# Patient Record
Sex: MALE | Race: WHITE | NOT HISPANIC OR LATINO | URBAN - METROPOLITAN AREA
[De-identification: names, ages, dates, MRNs, and addresses within clinical notes are randomized per-mention and may not be internally consistent; named-entity substitution may affect disease eponyms.]

---

## 2021-12-17 ENCOUNTER — NEW PATIENT (OUTPATIENT)
Dept: URBAN - METROPOLITAN AREA CLINIC 53 | Facility: CLINIC | Age: 61
End: 2021-12-17

## 2021-12-17 DIAGNOSIS — H52.4: ICD-10-CM

## 2021-12-17 DIAGNOSIS — H25.13: ICD-10-CM

## 2021-12-17 DIAGNOSIS — Z01.00: ICD-10-CM

## 2021-12-17 PROCEDURE — 92004 COMPRE OPH EXAM NEW PT 1/>: CPT

## 2021-12-17 PROCEDURE — 92015 DETERMINE REFRACTIVE STATE: CPT

## 2021-12-17 ASSESSMENT — VISUAL ACUITY
OS_GLARE: 20/30
OS_CC: J1+
OD_SC: J3
OD_GLARE: 20/30
OD_CC: J1+
OS_SC: J2
OU_CC: J1+
OU_SC: 20/20-1
OD_PH: 20/20-1
OD_SC: 20/50-2
OS_GLARE: 20/30
OD_GLARE: 20/30
OU_SC: J2
OS_SC: 20/25

## 2021-12-17 ASSESSMENT — KERATOMETRY
OD_K2POWER_DIOPTERS: 44.50
OD_AXISANGLE_DEGREES: 169
OS_K1POWER_DIOPTERS: 43.75
OS_K2POWER_DIOPTERS: 44.00
OS_AXISANGLE_DEGREES: 44
OD_K1POWER_DIOPTERS: 43.75
OD_AXISANGLE2_DEGREES: 79
OS_AXISANGLE2_DEGREES: 134

## 2021-12-17 ASSESSMENT — TONOMETRY
OS_IOP_MMHG: 17
OD_IOP_MMHG: 18

## 2021-12-17 NOTE — PATIENT DISCUSSION
Patient given Rx for glasses. Pt came in for pump dc. Tolerated well by pt. Will continue to monitor.

## 2024-04-09 ENCOUNTER — COMPREHENSIVE EXAM (OUTPATIENT)
Dept: URBAN - METROPOLITAN AREA CLINIC 53 | Facility: CLINIC | Age: 64
End: 2024-04-09

## 2024-04-09 DIAGNOSIS — H25.13: ICD-10-CM

## 2024-04-09 DIAGNOSIS — H40.023: ICD-10-CM

## 2024-04-09 DIAGNOSIS — H52.4: ICD-10-CM

## 2024-04-09 DIAGNOSIS — Z01.00: ICD-10-CM

## 2024-04-09 PROCEDURE — 92014 COMPRE OPH EXAM EST PT 1/>: CPT

## 2024-04-09 PROCEDURE — 92015 DETERMINE REFRACTIVE STATE: CPT

## 2024-04-09 ASSESSMENT — KERATOMETRY
OS_AXISANGLE_DEGREES: 44
OS_AXISANGLE2_DEGREES: 134
OD_AXISANGLE2_DEGREES: 79
OS_K1POWER_DIOPTERS: 43.75
OD_K2POWER_DIOPTERS: 44.50
OD_AXISANGLE_DEGREES: 169
OD_K1POWER_DIOPTERS: 43.75
OS_K2POWER_DIOPTERS: 44.00

## 2024-04-09 ASSESSMENT — VISUAL ACUITY
OU_CC: J1+
OD_GLARE: 20/30
OS_GLARE: 20/25
OS_SC: 20/30
OD_SC: 20/50
OS_GLARE: 20/30
OD_GLARE: 20/25

## 2024-04-09 ASSESSMENT — TONOMETRY
OD_IOP_MMHG: 18
OS_IOP_MMHG: 18

## 2024-04-17 ENCOUNTER — DIAGNOSTICS ONLY (OUTPATIENT)
Dept: URBAN - METROPOLITAN AREA CLINIC 53 | Facility: CLINIC | Age: 64
End: 2024-04-17

## 2024-04-17 DIAGNOSIS — H40.023: ICD-10-CM

## 2024-04-17 PROCEDURE — 92083 EXTENDED VISUAL FIELD XM: CPT

## 2024-04-17 PROCEDURE — 92133 CPTRZD OPH DX IMG PST SGM ON: CPT

## 2024-04-17 ASSESSMENT — KERATOMETRY
OD_K1POWER_DIOPTERS: 43.75
OS_AXISANGLE2_DEGREES: 134
OD_K2POWER_DIOPTERS: 44.50
OD_AXISANGLE_DEGREES: 169
OS_K2POWER_DIOPTERS: 44.00
OS_AXISANGLE_DEGREES: 44
OS_K1POWER_DIOPTERS: 43.75
OD_AXISANGLE2_DEGREES: 79

## 2024-04-26 ENCOUNTER — DIAGNOSTICS ONLY (OUTPATIENT)
Dept: URBAN - METROPOLITAN AREA CLINIC 53 | Facility: CLINIC | Age: 64
End: 2024-04-26

## 2024-04-26 DIAGNOSIS — H40.023: ICD-10-CM

## 2024-04-26 PROCEDURE — 92083 EXTENDED VISUAL FIELD XM: CPT | Mod: NC

## 2024-04-26 ASSESSMENT — KERATOMETRY
OD_K2POWER_DIOPTERS: 44.50
OS_AXISANGLE2_DEGREES: 134
OS_AXISANGLE_DEGREES: 44
OD_AXISANGLE_DEGREES: 169
OS_K2POWER_DIOPTERS: 44.00
OS_K1POWER_DIOPTERS: 43.75
OD_AXISANGLE2_DEGREES: 79
OD_K1POWER_DIOPTERS: 43.75

## 2024-08-16 ENCOUNTER — TRANSFERRED RECORDS (OUTPATIENT)
Dept: HEALTH INFORMATION MANAGEMENT | Facility: CLINIC | Age: 64
End: 2024-08-16

## 2024-08-21 ENCOUNTER — TRANSFERRED RECORDS (OUTPATIENT)
Dept: HEALTH INFORMATION MANAGEMENT | Facility: CLINIC | Age: 64
End: 2024-08-21

## 2024-08-22 ENCOUNTER — MEDICAL CORRESPONDENCE (OUTPATIENT)
Dept: HEALTH INFORMATION MANAGEMENT | Facility: CLINIC | Age: 64
End: 2024-08-22

## 2024-08-22 DIAGNOSIS — R69 DIAGNOSIS UNKNOWN: Primary | ICD-10-CM

## 2024-08-27 ENCOUNTER — CARE COORDINATION (OUTPATIENT)
Dept: GASTROENTEROLOGY | Facility: CLINIC | Age: 64
End: 2024-08-27

## 2024-08-30 ENCOUNTER — TELEPHONE (OUTPATIENT)
Dept: GASTROENTEROLOGY | Facility: CLINIC | Age: 64
End: 2024-08-30
Payer: MEDICARE

## 2024-08-30 NOTE — TELEPHONE ENCOUNTER
Advanced Endoscopy     Referring provider: Manny WAED, [facility]     Referred to: [facility]     Provider Requested: na     Referral Received: 8-22-24     Records received: Media tab/CareEverywhere     Images received: CT 8-2-24 in PACS, waiting for CT panc protocol from 8-21-24 to be mailed    Insurance Coverage: tbd    Evaluation for: EUS with FNA of pancreatic lesion     Clinical History (per RN review):     Patient followed up in clinic [facility].  During time of admission her incidental finding revealed a 0.9 cm enhancing structure adjacent to the pancreatic neck.  The finding was nonspecific and could potentially represent pseudoaneurysm or neuroendocrine tumor.  A follow-up multiphasic pancreatic protocol CT was recommended for further evaluation.  He denies any specific symptoms related incidental finding.      CT from [facility] 8-2-24- IN PACS    Formatting of this note might be different from the original.   EXAM: CT ABD PELVIS W IV CONT   LOCATION: [facility]   DATE: 8/2/2024     INDICATION: RLQ pain and tenderness   COMPARISON: None.   TECHNIQUE: Helical enhanced CT scan of the abdomen and pelvis was performed following injection of IV contrast. Multiplanar reformats were obtained. Dose reduction techniques were used.   CONTRAST: IOHEXOL 350 MG/ML IV SOLN 100 mL     FINDINGS:   LOWER CHEST: Mild dependent atelectasis bilaterally. Strands of scarring or atelectasis both lungs.     HEPATOBILIARY: Benign cavernous hemangiomas in the liver. Small portal venous shunt near the gallbladder fossa.     PANCREAS: 0.9 cm round structure in or adjacent to the pancreatic neck is isoattenuating to blood pool. Remainder of the pancreas is normal.     SPLEEN: Calcified granulomas.     ADRENAL GLANDS: Normal.     KIDNEYS/BLADDER: Bilateral kidney cysts, requiring no follow-up.     BOWEL: Proximal appendix is dilated up to  1.4 cm in diameter with diffuse wall thickening and mucosal hyperenhancement. Trace periappendiceal inflammation. Appendicolith versus old barium in the proximal appendix. Normal caliber, nondistended mid and distal appendix. Rare colonic diverticula. No diverticulitis.     LYMPH NODES: Normal.     VASCULATURE: 0.9 x 0.8 cm structure in or adjacent to the pancreatic neck (series 2, image 54) is in close proximity to the superior mesenteric vein and the gastroduodenal artery and isoattenuating to blood pool. Retroaortic left renal vein. 3.0 cm fusiform infrarenal abdominal aortic aneurysm. Moderate atherosclerosis.     PELVIC ORGANS: Pelvic phleboliths.     MUSCULOSKELETAL: Benign L2 vertebral body hemangioma. Moderate degenerative change in the spine.     IMPRESSION:   1.  Findings suspicious for early or mild acute appendicitis. No complications.   2.  Incidental 0.9 cm enhancing structure in or adjacent to the pancreatic neck is nonspecific and could represent a pseudoaneurysm or a neuroendocrine tumor. Consider multiphasic pancreatic protocol CT in further evaluation, when clinically feasible.       CT pancreas protocol completed 8/21/2024, read in media tab, did not have imaging yet    Findings: Within the neck of the pancreas just adjacent to the portal vein, there is an early phase enhancing lesion measuring 9 mm, unchanged from prior study.  Differential includes pancreatic carcinoma as well as insulinoma.  The remainder of the pancreas is homogenous in attenuation.    There is a lesion in the dome of the liver most typical for benign hemangioma measuring approximately 1.5 cm that feels in on the delayed phase imaging.  Adjacent hemangioma just inferior to this lesion.  Additional benign vascular lesion measuring less than 1 cm in the left hepatic lobe best seen on arterial phase.    Spleen demonstrates benign calcified granulomas.  The adrenal glands appeared normal.  Cyst in the posterior left kidney.  No  hydronephrosis.  Mild ectasia of the distal common abdominal aorta measuring 2.9 cm    Impression: Hyperenhancing lesion within the neck of the pancreas.    MD review date: 08/30/24    MD Decision for clinic consultation/Orders:            Referral updates/Patient contacted:

## 2024-09-05 ENCOUNTER — PREP FOR PROCEDURE (OUTPATIENT)
Dept: GASTROENTEROLOGY | Facility: CLINIC | Age: 64
End: 2024-09-05
Payer: MEDICARE

## 2024-09-05 ENCOUNTER — PATIENT OUTREACH (OUTPATIENT)
Dept: GASTROENTEROLOGY | Facility: CLINIC | Age: 64
End: 2024-09-05
Payer: MEDICARE

## 2024-09-05 DIAGNOSIS — K86.89 PANCREATIC MASS: Primary | ICD-10-CM

## 2024-09-05 NOTE — TELEPHONE ENCOUNTER
FUTURE VISIT INFORMATION      SURGERY INFORMATION:  EUS with Brendon Loya at May on 2024     RECORDS REQUESTED FROM:       Primary Care Provider: Hany Mckee    Most recent EKG+ Tracin24- [facility]

## 2024-09-05 NOTE — TELEPHONE ENCOUNTER
Called pt to discuss referral and Dr Loya's recommendations    Procedure/Imaging/Clinic: Upper EUS   Physician: First Available   Timing: Within 2 weeks   Scope time needed:Presuming with me, standard 90 minute MAC slot for UPU or 20 minutes of endoscopic time if OR   Anesthesia:MAC   Dx: Enhancing pancreatic head mass   Tier:Tier 2 - Not life/limb threatening but potential for  patient morbidity/mortality or adverse  patient/disease outcome if  surgery/procedure not done within 30 day   Location: Based on availability   OK to schedule while attending: Yes   Header of letter for pt communication:    Examination of the abdomen with ultrasound from inside the stomach. Possible biopsy of the pancreas.   Referred by Manny Jamil [facility]     Pt would like this scheduled next available date, discussed Thurs 9/12 in OR and pt in agreement.     Explained OR will call 1-2 days prior to procedure date with arrival time, will need a , someone to stay with them for 24 hours and should stay in town for 24 hours (within 45 min of Hospital) post procedure    Patient needs to get pre-op physical completed. If outside [facility] system will need physical faxed to number [phone]     If you do not get a preop physical, your procedure could be cancelled, patient voiced understanding*    Preop Plan: Virtual PAC appt arranged for 9/9 at 2:45pm    Does patient have any history of gastric bypass/gastric surgery/altered panc/bili anatomy? none    Any recent Covid symptoms or positive covid test? none    Med Review    Blood thinner - fish oil, hydrochlorothiazide and lisinopril - discussed holding day of procedure  ASA - 81 mg  Diabetic - none  Any meds by injection or mouth for weight loss or diabetes- none    Patient Education r/t procedure: mychart and email    A pre-op nurse will call 1-2 days prior to the procedure.    NPO/Prep:   Adults and Children of all ages may consume solids up to 8 hours prior to  arrival time - may consume clear liquids up to 1 hour prior to arrival time.    Verbalized understanding of all instructions. All questions answered.     Procedure order placed, message routed to OR      Yara Ellis, RN, BSN,   Advanced [facility]  Care coordinator

## 2024-09-09 ENCOUNTER — VIRTUAL VISIT (OUTPATIENT)
Dept: SURGERY | Facility: CLINIC | Age: 64
End: 2024-09-09
Payer: MEDICARE

## 2024-09-09 ENCOUNTER — ANESTHESIA EVENT (OUTPATIENT)
Dept: SURGERY | Facility: CLINIC | Age: 64
End: 2024-09-09
Payer: MEDICARE

## 2024-09-09 ENCOUNTER — PRE VISIT (OUTPATIENT)
Dept: SURGERY | Facility: CLINIC | Age: 64
End: 2024-09-09

## 2024-09-09 VITALS — HEIGHT: 71 IN | WEIGHT: 226 LBS | BODY MASS INDEX: 31.64 KG/M2

## 2024-09-09 DIAGNOSIS — Z01.818 PRE-OP EVALUATION: Primary | ICD-10-CM

## 2024-09-09 PROCEDURE — 99202 OFFICE O/P NEW SF 15 MIN: CPT | Mod: 95 | Performed by: PHYSICIAN ASSISTANT

## 2024-09-09 RX ORDER — NICOTINE POLACRILEX 4 MG/1
20 GUM, CHEWING ORAL EVERY MORNING
COMMUNITY

## 2024-09-09 RX ORDER — CYCLOBENZAPRINE HCL 5 MG
5 TABLET ORAL DAILY PRN
COMMUNITY

## 2024-09-09 RX ORDER — LISINOPRIL 40 MG/1
40 TABLET ORAL EVERY EVENING
COMMUNITY
Start: 2022-10-28

## 2024-09-09 RX ORDER — HYDROCHLOROTHIAZIDE 25 MG/1
25 TABLET ORAL EVERY EVENING
COMMUNITY
Start: 2022-10-28

## 2024-09-09 RX ORDER — SERTRALINE HYDROCHLORIDE 100 MG/1
100 TABLET, FILM COATED ORAL EVERY MORNING
COMMUNITY
Start: 2024-06-10 | End: 2025-06-11

## 2024-09-09 RX ORDER — ACETAMINOPHEN 500 MG
1000 TABLET ORAL EVERY MORNING
COMMUNITY

## 2024-09-09 RX ORDER — EZETIMIBE 10 MG/1
10 TABLET ORAL EVERY EVENING
COMMUNITY
Start: 2024-05-01

## 2024-09-09 RX ORDER — ASPIRIN 81 MG/1
81 TABLET ORAL EVERY EVENING
COMMUNITY

## 2024-09-09 RX ORDER — CHLORAL HYDRATE 500 MG
1 CAPSULE ORAL EVERY EVENING
COMMUNITY

## 2024-09-09 RX ORDER — ROSUVASTATIN CALCIUM 20 MG/1
20 TABLET, COATED ORAL EVERY EVENING
COMMUNITY
Start: 2023-12-26

## 2024-09-09 ASSESSMENT — PAIN SCALES - GENERAL: PAINLEVEL: NO PAIN (0)

## 2024-09-09 ASSESSMENT — ENCOUNTER SYMPTOMS: SEIZURES: 0

## 2024-09-09 ASSESSMENT — LIFESTYLE VARIABLES: TOBACCO_USE: 0

## 2024-09-09 NOTE — H&P
Pre-Operative H & P     CC:  Preoperative exam to assess for increased cardiopulmonary risk while undergoing surgery and anesthesia.    Date of Encounter: 9/9/2024  Primary Care Physician:  No primary care provider on file.     Reason for visit:   Encounter Diagnosis   Name Primary?    Pre-op evaluation Yes       HPI  Walt Estrada is a 64 year old male who presents for pre-operative H & P in preparation for  Procedure Information       Case: [id number] Date/Time: 09/12/24 1335    Procedure: ENDOSCOPIC ULTRASOUND, ESOPHAGOSCOPY / UPPER GASTROINTESTINAL TRACT (GI) (Esophagus)    Anesthesia type: MAC    Diagnosis: Pancreatic mass [K86.89]    Pre-op diagnosis: Pancreatic mass [K86.89]    Location: [facility] OR  / [facility]    Providers: Brendon Loya MD            Patient is being evaluated for comorbid conditions of hypertension, dyslipidemia, GERD    Mr. Estrada recently underwent appendectomy for acute appendicitis.  He was found to have a pancreatic head mass.  He has been referred to Dr. Loya and is now scheduled for EUS as above.    History is obtained from the patient and chart review    Hx of abnormal bleeding or anti-platelet use: ASA 81 mg      Past Medical History  Past Medical History:   Diagnosis Date    Dyslipidemia     Essential hypertension     GERD (gastroesophageal reflux disease)        Past Surgical History  Past Surgical History:   Procedure Laterality Date    APPENDECTOMY      HERNIA REPAIR      ORTHOPEDIC SURGERY      arm procedure    TONSILLECTOMY         Prior to Admission Medications  Current Outpatient Medications   Medication Sig Dispense Refill    acetaminophen (TYLENOL) 500 MG tablet Take 1,000 mg by mouth every morning.      aspirin 81 MG EC tablet Take 81 mg by mouth every evening.      Calcium 280 MG TABS Take 600 mg by mouth every evening.      Cholecalciferol (VITAMIN D3) 25 MCG (1000 UT) CAPS Take 1,000 Units by mouth every evening.      Cyanocobalamin (VITAMIN B12) 1000 MCG TBCR  Take 1,000 mcg by mouth every 48 hours.      cyclobenzaprine (FLEXERIL) 5 MG tablet Take 5 mg by mouth daily as needed for muscle spasms.      ezetimibe (ZETIA) 10 MG tablet Take 10 mg by mouth every evening.      fish oil-omega-3 fatty acids 1000 MG capsule Take 1 g by mouth every evening.      hydrochlorothiazide (HYDRODIURIL) 25 MG tablet Take 25 mg by mouth every evening.      lisinopril (ZESTRIL) 40 MG tablet Take 40 mg by mouth every evening.      Multiple Vitamin (MULTI VITAMIN) TABS Take 1 tablet by mouth every evening.      omeprazole 20 MG tablet Take 20 mg by mouth every morning.      rosuvastatin (CRESTOR) 20 MG tablet Take 20 mg by mouth every evening.      sertraline (ZOLOFT) 100 MG tablet Take 100 mg by mouth every morning.         Allergies  Allergies   Allergen Reactions    Chicken-Derived Products (Egg) GI Disturbance and Nausea and Vomiting    Lactose Diarrhea and GI Disturbance    Penicillins Unknown     Other reaction(s): Gastrointestinal       Social History  Social History     Socioeconomic History    Marital status:      Spouse name: Not on file    Number of children: Not on file    Years of education: Not on file    Highest education level: Not on file   Occupational History    Not on file   Tobacco Use    Smoking status: Former     Types: Cigarettes    Smokeless tobacco: Never    Tobacco comments:     Quit 2004   Substance and Sexual Activity    Alcohol use: Not Currently     Comment: quit 07/01/23 previously heavy use of alcohol for 10 years    Drug use: Never    Sexual activity: Not on file   Other Topics Concern    Not on file   Social History Narrative    Not on file     Social Determinants of Health     Financial Resource Strain: High Risk (1/1/2022)    [facility]    Financial Resource Strain     Difficulty of Paying Living Expenses: Not on file     Difficulty of Paying Living Expenses: Not on file   Food Insecurity: Not on file    Transportation Needs: Not on file   Physical Activity: Not on file   Stress: Not on file   Social Connections: Unknown (1/1/2022)    Received from [employer] [facility]    Social Connections     Frequency of Communication with Friends and Family: Not on file   Interpersonal Safety: Not on file   Housing Stability: Not on file       Family History  Family History   Problem Relation Age of Onset    Anesthesia Reaction No family hx of     Deep Vein Thrombosis (DVT) No family hx of        Review of Systems  The complete review of systems is negative other than noted in the HPI or here.   Anesthesia Evaluation   Pt has had prior anesthetic.     No history of anesthetic complications       ROS/MED HX  ENT/Pulmonary: Comment: H/o borderline ALEJANDRA but states improved symptoms since losing weight    (-) tobacco use   Neurologic:  - neg neurologic ROS  (-) no seizures and no CVA   Cardiovascular: Comment: cMRI 2017  Final conclusions:   1. Stress cardiac MRI study showed no evidence myocardial ischemia.   2. Gadolinium delayed enhancement showed no scar or fibrosis in the   ventricular myocardium.   3. Left ventricular chamber size is normal.  Left ventricular wall   thickness is normal.  There is no regional wall motion abnormality.  LV   Ejection fraction is normal at 64%.   4. Right ventricular chamber size is within the upper normal limit. Right   ventricular wall thickness is normal, and right ventricular systolic   function is preserved.   5. The visualized cardiac valves, including the mitral valve, the aortic   valve, and the tricuspid valve, were unremarkable.       (+) Dyslipidemia hypertension- -   -  - -                                   (-) taking anticoagulants/antiplatelets   METS/Exercise Tolerance: >4 METS Comment: Golfing, can ascend stairs and walk a mile without exertional symptoms.    Hematologic:  - neg hematologic  ROS  (-) history of blood clots and history of blood transfusion    Musculoskeletal:  - neg musculoskeletal ROS     GI/Hepatic: Comment: Pancreatic mass    (+) GERD, Asymptomatic on medication,                  Renal/Genitourinary:  - neg Renal ROS     Endo:  - neg endo ROS  (-) chronic steroid usage   Psychiatric/Substance Use:  - neg psychiatric ROS     Infectious Disease:  - neg infectious disease ROS     Malignancy:       Other:            Virtual visit -  No vitals were obtained    Physical Exam  Constitutional: Awake, alert, cooperative, no apparent distress, and appears stated age.  HENT: Normocephalic  Respiratory: non labored breathing   Neurologic: Awake, alert, oriented to name, place and time.   Neuropsychiatric: Calm, cooperative. Normal affect.      Prior Labs/Diagnostic Studies   All labs and imaging personally reviewed     EKG/ stress test - if available please see in ROS above   No results found.       No data to display                  The patient's records and results personally reviewed by this provider.     Outside records reviewed from: Care Everywhere      Assessment    Walt Estrada is a 64 year old male seen as a PAC referral for risk assessment and optimization for anesthesia.    Plan/Recommendations  Pt will be optimized for the proposed procedure.  See below for details on the assessment, risk, and preoperative recommendations    NEUROLOGY  - No history of TIA, CVA or seizure  -Post Op delirium risk factors:  No risk identified    ENT  - No current airway concerns.  Will need to be reassessed day of surgery.  Mallampati: Unable to assess  TM: Unable to assess    CARDIAC  - No history of CAD and Afib  -hypertension using lisinopril  -dyslipidemia using crestor  -denies cardiac symptoms   -previous cardiac testing above  - METS (Metabolic Equivalents)  Patient performs 4 or more METS exercise without symptoms             Total Score: 0      RCRI-Very low risk: Class 1 0.4% complication rate             Total Score: 0        PULMONARY  -patient reports he  "has completed sleep study- borderline. Reports symptoms improved with weight loss   - Denies asthma or inhaler use  - Tobacco History    History   Smoking Status    Former    Types: Cigarettes   Smokeless Tobacco    Never       GI  - GERD, controlled on PPI  -pancreatic mass with above procedure planned   PONV Medium Risk  Total Score: 2           1 AN PONV: Patient is not a current smoker    1 AN PONV: Intended Post Op Opioids        /RENAL  - Baseline Creatinine WNL    ENDOCRINE    - BMI: Estimated body mass index is 31.52 kg/m  as calculated from the following:    Height as of this encounter: 1.803 m (5' 11\").    Weight as of this encounter: 102.5 kg (226 lb).  Obesity (BMI >30)  - No history of Diabetes Mellitus    HEME  VTE Low Risk 0.5%             Total Score: 3    VTE: Greater than 59 yrs old    VTE: Male      - No history of abnormal bleeding or antiplatelet use.    MSK  Patient is NOT Frail             Total Score: 0      -PM&R referral not indicated       Different anesthesia methods/types have been discussed with the patient, but they are aware that the final plan will be decided by the assigned anesthesia provider on the date of service.    The patient is optimized for their procedure. AVS with information on surgery time/arrival time, meds and NPO status given by nursing staff. No further diagnostic testing indicated.    Please refer to the physical examination documented by the anesthesiologist in the anesthesia record on the day of surgery.    Video-Visit Details    Type of service:  Video Visit    Provider received verbal consent for a Video Visit from the patient? Yes     Originating Location (pt. Location): Home    Distant Location (provider location):  Off-site  Mode of Communication:  Video Conference via TeamSupport  On the day of service:     Prep time: 6 minutes  Visit time: 8 minutes  Documentation time: 6 minutes  ------------------------------------------  Total time: 20 minutes      Ivy" GILBERTO Gurrola PA-C  Preoperative Assessment Center  [facility]  Clinic and Surgery Center  Phone: [phone]  Fax: [phone]

## 2024-09-09 NOTE — PATIENT INSTRUCTIONS
Preparing for Your Surgery      Name:  Walt Estrada   MRN:  [id number]   :  1960   Today's Date:  2024         Arriving for surgery:  Surgery date:  24  Arrival time:  11.35AM    Restrictions due to COVID 19:    Please maintain social distance.  Masking is optional.      parking is available for anyone with mobility limitations or disabilities. (Monday- Friday 7 am- 5 pm)    Please come to:    [facility]  [address] [place] [id number]    Please check in on the 5th floor at the Ambulatory Surgery Center.      What can I eat or drink?    -  You may eat and drink normally until 8 hours prior to arrival  time. (Until 3.35AM)  -  You may have clear liquids until 2 hours prior to arrival  time. (Until 9.35AM)    Examples of clear liquids:  Water  Clear broth  Juices (apple, white grape, white cranberry  and cider) without pulp  Noncarbonated, powder based beverages  (lemonade and Willy-Aid)  Sodas (Sprite, 7-Up, ginger ale and seltzer)  Coffee or tea (without milk or cream)  Gatorade      Which medicines can I take?    Hold Aspirin for 7 days before surgery.   Hold Multivitamins for 7 days before surgery.  Hold Supplements for 7 days before surgery.  Hold Ibuprofen (Advil, Motrin) for 1 day before surgery--unless otherwise directed by surgeon.  Hold Naproxen (Aleve) for 4 days before surgery.    No alcohol or cannabis products for 24 hours prior to procedure.      -  DO NOT take the following medications the day of surgery:  Continue to hold aspirin, Vitamins and supplements  Ezetimibe (Zetia)  Hydrochlorothiazide (Hydrodiuril)    -  PLEASE TAKE the following medications the day of surgery:   Tylenol as needed  Flexeril as needed  Lisinopril (Zestril)  Zoloft      How do I prepare myself?  - Please take 2 showers (one the night prior to surgery and one the morning of surgery) using Scrubcare or Hibiclens soap.    Use this soap only from the neck to your toes.      Leave the soap on your skin for one minute--then rinse thoroughly.      You may use your own shampoo and conditioner. No other hair products.   - Please remove all jewelry and body piercings.  - No lotions, deodorants or fragrance.  - No makeup or fingernail polish.   - Bring your ID and insurance card.    -If you have a Deep Brain Stimulator, a Spinal Cord Stimulator, or any implanted Neuro Device, you must bring the remote to the Surgery Center.         ALL PATIENTS ARE REQUIRED TO HAVE A RESPONSIBLE ADULT TO DRIVE AND BE IN ATTENDANCE WITH THEM FOR 24 HOURS FOLLOWING SURGERY.     Covid testing policy as of 12/06/2022  Your surgeon will notify and schedule you for a COVID test if one is needed before surgery--please direct any questions or COVID symptoms to your surgeon      Questions or Concerns:    -For questions regarding the day of surgery, please contact the Ambulatory Surgery Center at [phone].    -If you have health changes between today and your surgery, please contact your surgeon.     - For questions after surgery, please contact your surgeon's office.

## 2024-09-09 NOTE — PROGRESS NOTES
Walt is a 64 year old who is being evaluated via a billable video visit.    How would you like to obtain your AVS? MyChart  If the video visit is dropped, the invitation should be resent by: Text to cell phone: [phone]    Subjective   Walt is a 64 year old, presenting for the following health issues:  Pre-Op Exam      HPI           Physical Exam

## 2024-09-11 ASSESSMENT — ENCOUNTER SYMPTOMS: SEIZURES: 0

## 2024-09-11 ASSESSMENT — LIFESTYLE VARIABLES: TOBACCO_USE: 0

## 2024-09-11 NOTE — ANESTHESIA PREPROCEDURE EVALUATION
Anesthesia Pre-Procedure Evaluation    Patient: Walt Estrada   MRN: [id number] : 1960        Procedure : Procedure(s):  ENDOSCOPIC ULTRASOUND, ESOPHAGOSCOPY / UPPER GASTROINTESTINAL TRACT (GI)          Past Medical History:   Diagnosis Date    Dyslipidemia     Essential hypertension     GERD (gastroesophageal reflux disease)       Past Surgical History:   Procedure Laterality Date    APPENDECTOMY      HERNIA REPAIR      ORTHOPEDIC SURGERY      arm procedure    TONSILLECTOMY        Allergies   Allergen Reactions    Chicken-Derived Products (Egg) GI Disturbance and Nausea and Vomiting    Lactose Diarrhea and GI Disturbance    Penicillins Unknown     Other reaction(s): Gastrointestinal      Social History     Tobacco Use    Smoking status: Former     Types: Cigarettes    Smokeless tobacco: Never    Tobacco comments:     Quit    Substance Use Topics    Alcohol use: Not Currently     Comment: quit 23 previously heavy use of alcohol for 10 years      Wt Readings from Last 1 Encounters:   24 102.5 kg (226 lb)        Anesthesia Evaluation   Pt has had prior anesthetic.     No history of anesthetic complications       ROS/MED HX  ENT/Pulmonary: Comment: H/o borderline ALEJANDRA but states improved symptoms since losing weight    (-) tobacco use   Neurologic:  - neg neurologic ROS  (-) no seizures and no CVA   Cardiovascular: Comment: cMRI 2017  Final conclusions:   1. Stress cardiac MRI study showed no evidence myocardial ischemia.   2. Gadolinium delayed enhancement showed no scar or fibrosis in the   ventricular myocardium.   3. Left ventricular chamber size is normal.  Left ventricular wall   thickness is normal.  There is no regional wall motion abnormality.  LV   Ejection fraction is normal at 64%.   4. Right ventricular chamber size is within the upper normal limit. Right   ventricular wall thickness is normal, and right ventricular systolic   function is preserved.   5. The visualized cardiac  "valves, including the mitral valve, the aortic   valve, and the tricuspid valve, were unremarkable.       (+) Dyslipidemia hypertension- -   -  - -                                   (-) taking anticoagulants/antiplatelets   METS/Exercise Tolerance: >4 METS Comment: Golfing, can ascend stairs and walk a mile without exertional symptoms.    Hematologic:  - neg hematologic  ROS  (-) history of blood clots and history of blood transfusion   Musculoskeletal:  - neg musculoskeletal ROS     GI/Hepatic: Comment: Pancreatic mass    (+) GERD, Asymptomatic on medication,                  Renal/Genitourinary:  - neg Renal ROS     Endo:  - neg endo ROS  (-) chronic steroid usage   Psychiatric/Substance Use:  - neg psychiatric ROS     Infectious Disease:  - neg infectious disease ROS     Malignancy:       Other:               OUTSIDE LABS:  CBC: No results found for: \"WBC\", \"HGB\", \"HCT\", \"PLT\"  BMP: No results found for: \"NA\", \"POTASSIUM\", \"CHLORIDE\", \"CO2\", \"BUN\", \"CR\", \"GLC\"  COAGS: No results found for: \"PTT\", \"INR\", \"FIBR\"  POC: No results found for: \"BGM\", \"HCG\", \"HCGS\"  HEPATIC: No results found for: \"ALBUMIN\", \"PROTTOTAL\", \"ALT\", \"AST\", \"GGT\", \"ALKPHOS\", \"BILITOTAL\", \"BILIDIRECT\", \"CHUN\"  OTHER: No results found for: \"PH\", \"LACT\", \"A1C\", \"LIZA\", \"PHOS\", \"MAG\", \"LIPASE\", \"AMYLASE\", \"TSH\", \"T4\", \"T3\", \"CRP\", \"SED\"    Anesthesia Plan    ASA Status:  3       Anesthesia Type: MAC.     - Reason for MAC: straight local not clinically adequate      Maintenance: Balanced.   Techniques and Equipment:     - Lines/Monitors: BIS     Consents            Postoperative Care    Pain management: IV analgesics, Oral pain medications, Multi-modal analgesia.   PONV prophylaxis: Ondansetron (or other 5HT-3), Dexamethasone or Solumedrol     Comments:               Job Weinstein MD    I have reviewed the pertinent notes and labs in the chart from the past 30 days and (re)examined the patient.  Any updates or changes from those notes are " "reflected in this note.              # Obesity: Estimated body mass index is 31.52 kg/m  as calculated from the following:    Height as of 9/9/24: 1.803 m (5' 11\").    Weight as of 9/9/24: 102.5 kg (226 lb).      "

## 2024-09-12 ENCOUNTER — HOSPITAL ENCOUNTER (OUTPATIENT)
Facility: CLINIC | Age: 64
Discharge: HOME | End: 2024-09-12
Attending: INTERNAL MEDICINE | Admitting: INTERNAL MEDICINE
Payer: MEDICARE

## 2024-09-12 ENCOUNTER — ANESTHESIA (OUTPATIENT)
Dept: SURGERY | Facility: CLINIC | Age: 64
End: 2024-09-12
Payer: MEDICARE

## 2024-09-12 ENCOUNTER — TELEPHONE (OUTPATIENT)
Dept: GASTROENTEROLOGY | Facility: CLINIC | Age: 64
End: 2024-09-12
Payer: MEDICARE

## 2024-09-12 VITALS
OXYGEN SATURATION: 98 % | WEIGHT: 228.4 LBS | TEMPERATURE: 97.4 F | RESPIRATION RATE: 16 BRPM | HEIGHT: 71 IN | HEART RATE: 74 BPM | DIASTOLIC BLOOD PRESSURE: 103 MMHG | SYSTOLIC BLOOD PRESSURE: 170 MMHG | BODY MASS INDEX: 31.98 KG/M2

## 2024-09-12 PROCEDURE — 88305 TISSUE EXAM BY PATHOLOGIST: CPT | Mod: 26 | Performed by: PATHOLOGY

## 2024-09-12 PROCEDURE — 88305 TISSUE EXAM BY PATHOLOGIST: CPT | Mod: TC | Performed by: INTERNAL MEDICINE

## 2024-09-12 PROCEDURE — 250N000009 HC RX 250

## 2024-09-12 PROCEDURE — 370N000017 HC ANESTHESIA TECHNICAL FEE, PER MIN: Performed by: INTERNAL MEDICINE

## 2024-09-12 PROCEDURE — 272N000001 HC OR GENERAL SUPPLY STERILE: Performed by: INTERNAL MEDICINE

## 2024-09-12 PROCEDURE — 710N000012 HC RECOVERY PHASE 2, PER MINUTE: Performed by: INTERNAL MEDICINE

## 2024-09-12 PROCEDURE — 250N000011 HC RX IP 250 OP 636

## 2024-09-12 PROCEDURE — 88172 CYTP DX EVAL FNA 1ST EA SITE: CPT | Mod: 26 | Performed by: PATHOLOGY

## 2024-09-12 PROCEDURE — 250N000009 HC RX 250: Performed by: INTERNAL MEDICINE

## 2024-09-12 PROCEDURE — 250N000013 HC RX MED GY IP 250 OP 250 PS 637

## 2024-09-12 PROCEDURE — 258N000003 HC RX IP 258 OP 636

## 2024-09-12 PROCEDURE — 43239 EGD BIOPSY SINGLE/MULTIPLE: CPT | Performed by: ANESTHESIOLOGY

## 2024-09-12 PROCEDURE — 360N000075 HC SURGERY LEVEL 2, PER MIN: Performed by: INTERNAL MEDICINE

## 2024-09-12 PROCEDURE — 88305 TISSUE EXAM BY PATHOLOGIST: CPT | Mod: 26 | Performed by: STUDENT IN AN ORGANIZED HEALTH CARE EDUCATION/TRAINING PROGRAM

## 2024-09-12 PROCEDURE — 88173 CYTOPATH EVAL FNA REPORT: CPT | Mod: 26 | Performed by: PATHOLOGY

## 2024-09-12 PROCEDURE — 999N000141 HC STATISTIC PRE-PROCEDURE NURSING ASSESSMENT: Performed by: INTERNAL MEDICINE

## 2024-09-12 RX ORDER — NALOXONE HYDROCHLORIDE 0.4 MG/ML
0.4 INJECTION, SOLUTION INTRAMUSCULAR; INTRAVENOUS; SUBCUTANEOUS
Status: DISCONTINUED | OUTPATIENT
Start: 2024-09-12 | End: 2024-09-12 | Stop reason: HOSPADM

## 2024-09-12 RX ORDER — NALOXONE HYDROCHLORIDE 0.4 MG/ML
0.1 INJECTION, SOLUTION INTRAMUSCULAR; INTRAVENOUS; SUBCUTANEOUS
Status: DISCONTINUED | OUTPATIENT
Start: 2024-09-12 | End: 2024-09-12 | Stop reason: HOSPADM

## 2024-09-12 RX ORDER — ONDANSETRON 2 MG/ML
4 INJECTION INTRAMUSCULAR; INTRAVENOUS EVERY 30 MIN PRN
Status: DISCONTINUED | OUTPATIENT
Start: 2024-09-12 | End: 2024-09-12 | Stop reason: HOSPADM

## 2024-09-12 RX ORDER — ACETAMINOPHEN 325 MG/1
975 TABLET ORAL ONCE
Status: COMPLETED | OUTPATIENT
Start: 2024-09-12 | End: 2024-09-12

## 2024-09-12 RX ORDER — FLUMAZENIL 0.1 MG/ML
0.2 INJECTION, SOLUTION INTRAVENOUS
Status: DISCONTINUED | OUTPATIENT
Start: 2024-09-12 | End: 2024-09-12 | Stop reason: HOSPADM

## 2024-09-12 RX ORDER — ONDANSETRON 4 MG/1
4 TABLET, ORALLY DISINTEGRATING ORAL EVERY 6 HOURS PRN
Status: DISCONTINUED | OUTPATIENT
Start: 2024-09-12 | End: 2024-09-12 | Stop reason: HOSPADM

## 2024-09-12 RX ORDER — FENTANYL CITRATE 50 UG/ML
INJECTION, SOLUTION INTRAMUSCULAR; INTRAVENOUS PRN
Status: DISCONTINUED | OUTPATIENT
Start: 2024-09-12 | End: 2024-09-12

## 2024-09-12 RX ORDER — PROPOFOL 10 MG/ML
INJECTION, EMULSION INTRAVENOUS CONTINUOUS PRN
Status: DISCONTINUED | OUTPATIENT
Start: 2024-09-12 | End: 2024-09-12

## 2024-09-12 RX ORDER — ONDANSETRON 4 MG/1
4 TABLET, ORALLY DISINTEGRATING ORAL EVERY 30 MIN PRN
Status: DISCONTINUED | OUTPATIENT
Start: 2024-09-12 | End: 2024-09-12 | Stop reason: HOSPADM

## 2024-09-12 RX ORDER — FENTANYL CITRATE 50 UG/ML
50 INJECTION, SOLUTION INTRAMUSCULAR; INTRAVENOUS EVERY 5 MIN PRN
Status: DISCONTINUED | OUTPATIENT
Start: 2024-09-12 | End: 2024-09-12 | Stop reason: HOSPADM

## 2024-09-12 RX ORDER — LIDOCAINE HYDROCHLORIDE 20 MG/ML
INJECTION, SOLUTION INFILTRATION; PERINEURAL PRN
Status: DISCONTINUED | OUTPATIENT
Start: 2024-09-12 | End: 2024-09-12

## 2024-09-12 RX ORDER — DEXAMETHASONE SODIUM PHOSPHATE 4 MG/ML
4 INJECTION, SOLUTION INTRA-ARTICULAR; INTRALESIONAL; INTRAMUSCULAR; INTRAVENOUS; SOFT TISSUE
Status: DISCONTINUED | OUTPATIENT
Start: 2024-09-12 | End: 2024-09-12 | Stop reason: HOSPADM

## 2024-09-12 RX ORDER — HYDROMORPHONE HCL IN WATER/PF 6 MG/30 ML
0.4 PATIENT CONTROLLED ANALGESIA SYRINGE INTRAVENOUS EVERY 5 MIN PRN
Status: DISCONTINUED | OUTPATIENT
Start: 2024-09-12 | End: 2024-09-12 | Stop reason: HOSPADM

## 2024-09-12 RX ORDER — HYDROMORPHONE HCL IN WATER/PF 6 MG/30 ML
0.2 PATIENT CONTROLLED ANALGESIA SYRINGE INTRAVENOUS EVERY 5 MIN PRN
Status: DISCONTINUED | OUTPATIENT
Start: 2024-09-12 | End: 2024-09-12 | Stop reason: HOSPADM

## 2024-09-12 RX ORDER — HALOPERIDOL 5 MG/ML
1 INJECTION INTRAMUSCULAR
Status: DISCONTINUED | OUTPATIENT
Start: 2024-09-12 | End: 2024-09-12 | Stop reason: HOSPADM

## 2024-09-12 RX ORDER — SODIUM CHLORIDE, SODIUM LACTATE, POTASSIUM CHLORIDE, CALCIUM CHLORIDE 600; 310; 30; 20 MG/100ML; MG/100ML; MG/100ML; MG/100ML
INJECTION, SOLUTION INTRAVENOUS CONTINUOUS
Status: DISCONTINUED | OUTPATIENT
Start: 2024-09-12 | End: 2024-09-12 | Stop reason: HOSPADM

## 2024-09-12 RX ORDER — FENTANYL CITRATE 50 UG/ML
25 INJECTION, SOLUTION INTRAMUSCULAR; INTRAVENOUS EVERY 5 MIN PRN
Status: DISCONTINUED | OUTPATIENT
Start: 2024-09-12 | End: 2024-09-12 | Stop reason: HOSPADM

## 2024-09-12 RX ORDER — PROPOFOL 10 MG/ML
INJECTION, EMULSION INTRAVENOUS PRN
Status: DISCONTINUED | OUTPATIENT
Start: 2024-09-12 | End: 2024-09-12

## 2024-09-12 RX ORDER — ONDANSETRON 2 MG/ML
4 INJECTION INTRAMUSCULAR; INTRAVENOUS EVERY 6 HOURS PRN
Status: DISCONTINUED | OUTPATIENT
Start: 2024-09-12 | End: 2024-09-12 | Stop reason: HOSPADM

## 2024-09-12 RX ORDER — DEXAMETHASONE SODIUM PHOSPHATE 4 MG/ML
INJECTION, SOLUTION INTRA-ARTICULAR; INTRALESIONAL; INTRAMUSCULAR; INTRAVENOUS; SOFT TISSUE PRN
Status: DISCONTINUED | OUTPATIENT
Start: 2024-09-12 | End: 2024-09-12

## 2024-09-12 RX ORDER — NALOXONE HYDROCHLORIDE 0.4 MG/ML
0.2 INJECTION, SOLUTION INTRAMUSCULAR; INTRAVENOUS; SUBCUTANEOUS
Status: DISCONTINUED | OUTPATIENT
Start: 2024-09-12 | End: 2024-09-12 | Stop reason: HOSPADM

## 2024-09-12 RX ORDER — SODIUM CHLORIDE, SODIUM LACTATE, POTASSIUM CHLORIDE, CALCIUM CHLORIDE 600; 310; 30; 20 MG/100ML; MG/100ML; MG/100ML; MG/100ML
INJECTION, SOLUTION INTRAVENOUS CONTINUOUS PRN
Status: DISCONTINUED | OUTPATIENT
Start: 2024-09-12 | End: 2024-09-12

## 2024-09-12 RX ORDER — ONDANSETRON 2 MG/ML
INJECTION INTRAMUSCULAR; INTRAVENOUS PRN
Status: DISCONTINUED | OUTPATIENT
Start: 2024-09-12 | End: 2024-09-12

## 2024-09-12 RX ORDER — LIDOCAINE 40 MG/G
CREAM TOPICAL
Status: DISCONTINUED | OUTPATIENT
Start: 2024-09-12 | End: 2024-09-12 | Stop reason: HOSPADM

## 2024-09-12 RX ORDER — OXYCODONE HYDROCHLORIDE 5 MG/1
5 TABLET ORAL
Status: DISCONTINUED | OUTPATIENT
Start: 2024-09-12 | End: 2024-09-12 | Stop reason: HOSPADM

## 2024-09-12 RX ORDER — OXYCODONE HYDROCHLORIDE 10 MG/1
10 TABLET ORAL
Status: DISCONTINUED | OUTPATIENT
Start: 2024-09-12 | End: 2024-09-12 | Stop reason: HOSPADM

## 2024-09-12 RX ADMIN — ACETAMINOPHEN 975 MG: 325 TABLET ORAL at 09:43

## 2024-09-12 RX ADMIN — PROPOFOL 10 MG: 10 INJECTION, EMULSION INTRAVENOUS at 10:21

## 2024-09-12 RX ADMIN — PROPOFOL 20 MG: 10 INJECTION, EMULSION INTRAVENOUS at 10:54

## 2024-09-12 RX ADMIN — SODIUM CHLORIDE, POTASSIUM CHLORIDE, SODIUM LACTATE AND CALCIUM CHLORIDE: 600; 310; 30; 20 INJECTION, SOLUTION INTRAVENOUS at 10:06

## 2024-09-12 RX ADMIN — PROPOFOL 10 MG: 10 INJECTION, EMULSION INTRAVENOUS at 10:42

## 2024-09-12 RX ADMIN — MIDAZOLAM 2 MG: 1 INJECTION INTRAMUSCULAR; INTRAVENOUS at 10:06

## 2024-09-12 RX ADMIN — PROPOFOL 20 MG: 10 INJECTION, EMULSION INTRAVENOUS at 10:26

## 2024-09-12 RX ADMIN — PROPOFOL 30 MG: 10 INJECTION, EMULSION INTRAVENOUS at 10:14

## 2024-09-12 RX ADMIN — PROPOFOL 10 MG: 10 INJECTION, EMULSION INTRAVENOUS at 10:30

## 2024-09-12 RX ADMIN — LIDOCAINE HYDROCHLORIDE 40 MG: 20 INJECTION, SOLUTION INFILTRATION; PERINEURAL at 10:14

## 2024-09-12 RX ADMIN — PROPOFOL 20 MG: 10 INJECTION, EMULSION INTRAVENOUS at 10:44

## 2024-09-12 RX ADMIN — PROPOFOL 10 MG: 10 INJECTION, EMULSION INTRAVENOUS at 10:33

## 2024-09-12 RX ADMIN — DEXAMETHASONE SODIUM PHOSPHATE 4 MG: 4 INJECTION, SOLUTION INTRA-ARTICULAR; INTRALESIONAL; INTRAMUSCULAR; INTRAVENOUS; SOFT TISSUE at 10:20

## 2024-09-12 RX ADMIN — PROPOFOL 10 MG: 10 INJECTION, EMULSION INTRAVENOUS at 10:40

## 2024-09-12 RX ADMIN — FENTANYL CITRATE 50 MCG: 50 INJECTION INTRAMUSCULAR; INTRAVENOUS at 11:13

## 2024-09-12 RX ADMIN — PROPOFOL 100 MCG/KG/MIN: 10 INJECTION, EMULSION INTRAVENOUS at 10:12

## 2024-09-12 RX ADMIN — PROPOFOL 10 MG: 10 INJECTION, EMULSION INTRAVENOUS at 11:09

## 2024-09-12 RX ADMIN — FENTANYL CITRATE 50 MCG: 50 INJECTION INTRAMUSCULAR; INTRAVENOUS at 10:55

## 2024-09-12 RX ADMIN — PROPOFOL 10 MG: 10 INJECTION, EMULSION INTRAVENOUS at 10:59

## 2024-09-12 RX ADMIN — ONDANSETRON 4 MG: 2 INJECTION INTRAMUSCULAR; INTRAVENOUS at 10:33

## 2024-09-12 ASSESSMENT — ACTIVITIES OF DAILY LIVING (ADL)
ADLS_ACUITY_SCORE: 29
ADLS_ACUITY_SCORE: 29
ADLS_ACUITY_SCORE: 27
ADLS_ACUITY_SCORE: 29

## 2024-09-12 NOTE — OR NURSING
Dr Jean aware of pt's BP and instructed pt to take his BP meds when he gets home.  Pt voided at this time.  He is here with his wife, Linda and son Abraham.

## 2024-09-12 NOTE — ANESTHESIA POSTPROCEDURE EVALUATION
Patient: Walt Estrada    Procedure: Procedure(s):  ENDOSCOPIC ULTRASOUND, ESOPHAGOSCOPY / UPPER GASTROINTESTINAL TRACT (GI) with biopsies and fine needle aspiration       Anesthesia Type:  MAC    Note:  Disposition: Outpatient   Postop Pain Control: Uneventful            Sign Out: Well controlled pain   PONV: No   Neuro/Psych: Uneventful            Sign Out: Acceptable/Baseline neuro status   Airway/Respiratory: Uneventful            Sign Out: Acceptable/Baseline resp. status   CV/Hemodynamics: Uneventful            Sign Out: Acceptable CV status; No obvious hypovolemia; No obvious fluid overload   Other NRE: NONE   DID A NON-ROUTINE EVENT OCCUR? No       Last vitals:  Vitals Value Taken Time   /101 09/12/24 1208   Temp 36.3  C (97.4  F) 09/12/24 1200   Pulse 74 09/12/24 1128   Resp 16 09/12/24 1200   SpO2 97 % 09/12/24 1212   Vitals shown include unfiled device data.    Electronically Signed By: Srinivas Jean MD  September 12, 2024  12:59 PM

## 2024-09-12 NOTE — TELEPHONE ENCOUNTER
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  Biopsies are pending.    Please arrange for referrals to medical and surgical oncology.    RUBI Loya MD  Professor of Medicine  Division of Gastroenterology, Hepatology and Nutrition  [facility]

## 2024-09-12 NOTE — ANESTHESIA CARE TRANSFER NOTE
Patient: Walt Estrada    Procedure: Procedure(s):  ENDOSCOPIC ULTRASOUND, ESOPHAGOSCOPY / UPPER GASTROINTESTINAL TRACT (GI) with biopsies and fine needle aspiration       Diagnosis: Pancreatic mass [K86.89]  Diagnosis Additional Information: No value filed.    Anesthesia Type:   MAC     Note:    Oropharynx: oropharynx clear of all foreign objects and spontaneously breathing  Level of Consciousness: awake  Oxygen Supplementation: face mask  Level of Supplemental Oxygen (L/min / FiO2): 6  Independent Airway: airway patency satisfactory and stable  Dentition: dentition unchanged  Vital Signs Stable: post-procedure vital signs reviewed and stable  Report to RN Given: handoff report given  Patient transferred to: Phase II    Handoff Report: Identifed the Patient, Identified the Reponsible Provider, Reviewed the pertinent medical history, Discussed the surgical course, Reviewed Intra-OP anesthesia mangement and issues during anesthesia, Set expectations for post-procedure period and Allowed opportunity for questions and acknowledgement of understanding      Vitals:  Vitals Value Taken Time   /112 09/12/24 1127   Temp     Pulse 74 09/12/24 1128   Resp     SpO2 99 % 09/12/24 1129   Vitals shown include unfiled device data.    Electronically Signed By: Job Weinstein MD  September 12, 2024  11:30 AM

## 2024-09-12 NOTE — OR NURSING
Dr Loya spoke with patient, patient's wife Linda and son, Abraham post procedure and answered all questions.

## 2024-09-12 NOTE — DISCHARGE INSTRUCTIONS
[facility], [place]  Same-Day Surgery   Adult Discharge Orders & Instructions     For 24 hours after surgery    Get plenty of rest.  A responsible adult must stay with you for at least 24 hours after you leave the hospital.   Do not drive or use heavy equipment.  If you have weakness or tingling, don't drive or use heavy equipment until this feeling goes away.  Do not drink alcohol.  Avoid strenuous or risky activities.  Ask for help when climbing stairs.   You may feel lightheaded.  IF so, sit for a few minutes before standing.  Have someone help you get up.   If you have nausea (feel sick to your stomach): Drink only clear liquids such as apple juice, ginger ale, broth or 7-Up.  Rest may also help.  Be sure to drink enough fluids.  Move to a regular diet as you feel able.  You may have a slight fever. Call the doctor if your fever is over 100 F (37.7 C) (taken under the tongue) or lasts longer than 24 hours.  You may have a dry mouth, a sore throat, muscle aches or trouble sleeping.  These should go away after 24 hours.  Do not make important or legal decisions.   Call your doctor for any of the followin.  Signs of infection (fever, growing tenderness at the surgery site, a large amount of drainage or bleeding, severe pain, foul-smelling drainage, redness, swelling).    2. It has been over 8 to 10 hours since surgery and you are still not able to urinate (pass water).    3.  Headache for over 24 hours.    4.  Numbness, tingling or weakness the day after surgery (if you had spinal anesthesia).  To contact a doctor, call ________________________________________ or:    '   [phone] and ask for the resident on call for   ______________________________________________ (answered 24 hours a day)  '   Emergency Department:    [facility]: [phone]       (TTY for hearing impaired: [phone])

## 2024-09-12 NOTE — BRIEF OP NOTE
[facility]    Brief Operative Note    Pre-operative diagnosis: Pancreatic mass [K86.89]  Post-operative diagnosis Same    Procedure: ENDOSCOPIC ULTRASOUND, ESOPHAGOSCOPY / UPPER GASTROINTESTINAL TRACT (GI) with biopsies and fine needle aspiration, N/A - Esophagus    Surgeon: Surgeons and Role:     * Brendon Loya MD - Primary  Anesthesia: MAC   Estimated Blood Loss: Minimal    Drains: None  Specimens:   ID Type Source Tests Collected by Time Destination   1 : stomach erythema Biopsy Stomach SURGICAL PATHOLOGY EXAM Brendon Loya MD 9/12/2024 10:33 AM    2 : esophagus 39cm Biopsy Esophagus SURGICAL PATHOLOGY EXAM Brendon Loya MD 9/12/2024 10:34 AM    3 : esophagus 37cm Biopsy Esophagus SURGICAL PATHOLOGY EXAM Brendon Loya MD 9/12/2024 10:36 AM    4 : esophagus 35cm Biopsy Esophagus SURGICAL PATHOLOGY EXAM Brendon Loya MD 9/12/2024 10:38 AM    5 : pancreatic head mass Fine Needle Aspiration Pancreas FINE NEEDLE ASPIRATE Brendon Loya MD 9/12/2024 11:03 AM      Findings:     EGD:  Billy's esophagus was seen from 35-40 cm ([place] C3 M5). There were no high-risk features with white light or narrow band imaging. Four quadrant biopsies were obtained with a large forceps at 35, 37 and 39 cm.  A 3 cm sliding hiatal hernia was present.  The GE Junction was Hill grade IV.  There was striped erythema in the antrum.  Gastric biopsies were obtained.  The duodenum was normal.    EUS  The pancreas was diffusely hyperechoic consistent with fatty infiltration. Divisum was excluded. There were no features of chronic pancreatitis and the PD was not dilated.  A 12 mm round discrete hypoechoic mass was seen in the head. This was in contact with the main PD, however there was no caliber change.   Needle aspiration was performed using a 22 ga EchoTip needle. Five passes were made. Preliminary cytology showed scattered atypical groups. Use  of a biopsy needle was not performed given the adjacent duct and intervening vessels.    No other pancreatic lesions were seen.  There was no adenopathy.  There was a 1.5 cm round hyperechoic lesion on the outer surface of the right lobe of the liver. The appearance is suggestive of hemangioma, as was the CT appearance. No suspicious lesions were seen.  The bile duct and gallbladder were normal.  Complications: None.  Implants: * No implants in log *    RUBI Loya MD  Professor of Medicine  Division of Gastroenterology, Hepatology and Nutrition  [facility]

## 2024-09-13 DIAGNOSIS — D49.0 IPMN (INTRADUCTAL PAPILLARY MUCINOUS NEOPLASM): Primary | ICD-10-CM

## 2024-09-13 DIAGNOSIS — K86.89 PANCREATIC MASS: ICD-10-CM

## 2024-09-13 LAB
PATH REPORT.COMMENTS IMP SPEC: NORMAL
PATH REPORT.COMMENTS IMP SPEC: NORMAL
PATH REPORT.FINAL DX SPEC: NORMAL
PATH REPORT.GROSS SPEC: NORMAL
PATH REPORT.MICROSCOPIC SPEC OTHER STN: NORMAL
PATH REPORT.RELEVANT HX SPEC: NORMAL
PHOTO IMAGE: NORMAL
UPPER EUS: NORMAL

## 2024-09-16 LAB
PATH REPORT.COMMENTS IMP SPEC: ABNORMAL
PATH REPORT.COMMENTS IMP SPEC: YES
PATH REPORT.FINAL DX SPEC: ABNORMAL
PATH REPORT.GROSS SPEC: ABNORMAL
PATH REPORT.MICROSCOPIC SPEC OTHER STN: ABNORMAL
PATH REPORT.RELEVANT HX SPEC: ABNORMAL

## 2024-09-23 ENCOUNTER — PREP FOR PROCEDURE (OUTPATIENT)
Dept: GASTROENTEROLOGY | Facility: CLINIC | Age: 64
End: 2024-09-23
Payer: MEDICARE

## 2024-09-23 ENCOUNTER — PATIENT OUTREACH (OUTPATIENT)
Dept: GASTROENTEROLOGY | Facility: CLINIC | Age: 64
End: 2024-09-23
Payer: MEDICARE

## 2024-09-23 DIAGNOSIS — K86.89 PANCREATIC MASS: Primary | ICD-10-CM

## 2024-09-23 NOTE — TELEPHONE ENCOUNTER
Called pt to coordinate follow up EUS on 10/3/24.  Pt spoke with Dr Loya this am to discuss prior pathology from 9/12 EUS and in agreement with proceeding.     Procedure/Imaging/Clinic: Upper EUS / ERCP  Physician: Vincenzo / Jese  Timing: Within 2 weeks   Scope time needed: 20 minutes EUS/ 10 min ERCP  Anesthesia:General  Dx: Enhancing pancreatic head mass   Tier:Tier 2 - Not life/limb threatening but potential for  patient morbidity/mortality or adverse  patient/disease outcome if  surgery/procedure not done within 30 day   Location: Based on availability   OK to schedule while attending: Yes   Header of letter for pt communication:    Examination of the abdomen with ultrasound from inside the stomach. Possible biopsy of the pancreas.   [facility]       Explained OR will call 1-2 days prior to procedure date with arrival time, will need a , someone to stay with them for 24 hours and should stay in town for 24 hours (within 45 min of Hospital) post procedure     Patient needs to get pre-op physical completed. If outside [facility] system will need physical faxed to number [phone]      If you do not get a preop physical, your procedure could be cancelled, patient voiced understanding*     Preop Plan: Virtual PAC appt arranged for 9/9 to be utilized     Does patient have any history of gastric bypass/gastric surgery/altered panc/bili anatomy? none     Any recent Covid symptoms or positive covid test? none     Med Review     Blood thinner - fish oil, hydrochlorothiazide- discussed holding day of procedure. Did advise pt use recs from 9/9 PAC visit  ASA - 81 mg  Diabetic - none  Any meds by injection or mouth for weight loss or diabetes- none     Patient Education r/t procedure: mychart      A pre-op nurse will call 1-2 days prior to the procedure.     NPO/Prep:   Adults and Children of all ages may consume solids up to 8 hours prior to arrival time - may consume  clear liquids up to 1 hour prior to arrival time.     Verbalized understanding of all instructions. All questions answered.      Procedure order placed, message routed to OR      Yara Ellis, RN, BSN,   [facility]  Care coordinator

## 2024-10-02 ENCOUNTER — ANESTHESIA EVENT (OUTPATIENT)
Dept: SURGERY | Facility: CLINIC | Age: 64
End: 2024-10-02
Payer: MEDICARE

## 2024-10-02 ASSESSMENT — ENCOUNTER SYMPTOMS: SEIZURES: 0

## 2024-10-02 ASSESSMENT — LIFESTYLE VARIABLES: TOBACCO_USE: 1

## 2024-10-02 NOTE — ANESTHESIA PREPROCEDURE EVALUATION
Anesthesia Pre-Procedure Evaluation    Patient: Walt Estrada   MRN: [id number] : 1960        Procedure : Procedure(s):  ENDOSCOPIC ULTRASOUND, ESOPHAGOSCOPY / UPPER GASTROINTESTINAL TRACT (GI)  Possible Endoscopic retrograde cholangiopancreatogram          Past Medical History:   Diagnosis Date    Dyslipidemia     Essential hypertension     GERD (gastroesophageal reflux disease)       Past Surgical History:   Procedure Laterality Date    APPENDECTOMY      ENDOSCOPIC ULTRASOUND UPPER GASTROINTESTINAL TRACT (GI) N/A 2024    Procedure: ENDOSCOPIC ULTRASOUND, ESOPHAGOSCOPY / UPPER GASTROINTESTINAL TRACT (GI) with biopsies and fine needle aspiration;  Surgeon: Brendon Loya MD;  Location: [facility] OR    HERNIA REPAIR      ORTHOPEDIC SURGERY      arm procedure    TONSILLECTOMY        Allergies   Allergen Reactions    Penicillins Unknown     Other reaction(s): Gastrointestinal      Social History     Tobacco Use    Smoking status: Former     Types: Cigarettes    Smokeless tobacco: Never    Tobacco comments:     Quit    Substance Use Topics    Alcohol use: Not Currently     Comment: quit 23 previously heavy use of alcohol for 10 years      Wt Readings from Last 1 Encounters:   24 103.6 kg (228 lb 6.3 oz)        Anesthesia Evaluation   Pt has had prior anesthetic.     No history of anesthetic complications       ROS/MED HX  ENT/Pulmonary: Comment: H/o borderline ALEJANDRA but states improved symptoms since losing weight     (+)                tobacco use, Past use,                       Neurologic:  - neg neurologic ROS  (-) no seizures and no CVA   Cardiovascular: Comment: cMRI 2017  Final conclusions:   1. Stress cardiac MRI study showed no evidence myocardial ischemia.   2. Gadolinium delayed enhancement showed no scar or fibrosis in the   ventricular myocardium.   3. Left ventricular chamber size is normal.  Left ventricular wall   thickness is normal.  There is no regional wall motion  "abnormality.  LV   Ejection fraction is normal at 64%.   4. Right ventricular chamber size is within the upper normal limit. Right   ventricular wall thickness is normal, and right ventricular systolic   function is preserved.   5. The visualized cardiac valves, including the mitral valve, the aortic   valve, and the tricuspid valve, were unremarkable.       (+) Dyslipidemia hypertension- -   -  - -                                   (-) taking anticoagulants/antiplatelets   METS/Exercise Tolerance: >4 METS Comment: Golfing, can ascend stairs and walk a mile without exertional symptoms.    Hematologic:  - neg hematologic  ROS  (-) history of blood clots and history of blood transfusion   Musculoskeletal:  - neg musculoskeletal ROS     GI/Hepatic: Comment: S/p EGD and EUS on 9/12/24:   - Billy's esophagus  - pancreas was diffusely hyperechoic consistent with fatty infiltration    (+) GERD, Asymptomatic on medication, esophageal disease,                 Renal/Genitourinary:  - neg Renal ROS     Endo:  - neg endo ROS  (-) chronic steroid usage   Psychiatric/Substance Use:  - neg psychiatric ROS     Infectious Disease:  - neg infectious disease ROS     Malignancy:       Other:            Physical Exam    Airway  airway exam normal           Respiratory Devices and Support         Dental       (+) Completely normal teeth      Cardiovascular   cardiovascular exam normal          Pulmonary   pulmonary exam normal                OUTSIDE LABS:  CBC: No results found for: \"WBC\", \"HGB\", \"HCT\", \"PLT\"  BMP: No results found for: \"NA\", \"POTASSIUM\", \"CHLORIDE\", \"CO2\", \"BUN\", \"CR\", \"GLC\"  COAGS: No results found for: \"PTT\", \"INR\", \"FIBR\"  POC: No results found for: \"BGM\", \"HCG\", \"HCGS\"  HEPATIC: No results found for: \"ALBUMIN\", \"PROTTOTAL\", \"ALT\", \"AST\", \"GGT\", \"ALKPHOS\", \"BILITOTAL\", \"BILIDIRECT\", \"CHUN\"  OTHER: No results found for: \"PH\", \"LACT\", \"A1C\", \"LIZA\", \"PHOS\", \"MAG\", \"LIPASE\", \"AMYLASE\", \"TSH\", \"T4\", \"T3\", \"CRP\", " "\"SED\"    Anesthesia Plan    ASA Status:  3       Anesthesia Type: General.     - Airway: ETT   Induction: Intravenous, Propofol.   Maintenance: Balanced.   Techniques and Equipment:     - Lines/Monitors: BIS     Consents            Postoperative Care    Pain management: IV analgesics, Oral pain medications, Multi-modal analgesia.   PONV prophylaxis: Ondansetron (or other 5HT-3), Dexamethasone or Solumedrol     Comments:               Mari Silverman MD    I have reviewed the pertinent notes and labs in the chart from the past 30 days and (re)examined the patient.  Any updates or changes from those notes are reflected in this note.              # Obesity: Estimated body mass index is 31.85 kg/m  as calculated from the following:    Height as of 9/12/24: 1.803 m (5' 11\").    Weight as of 9/12/24: 103.6 kg (228 lb 6.3 oz).      "

## 2024-10-03 ENCOUNTER — ANESTHESIA (OUTPATIENT)
Dept: SURGERY | Facility: CLINIC | Age: 64
End: 2024-10-03
Payer: MEDICARE

## 2024-10-03 ENCOUNTER — HOSPITAL ENCOUNTER (OUTPATIENT)
Facility: CLINIC | Age: 64
Discharge: HOME | End: 2024-10-03
Attending: INTERNAL MEDICINE | Admitting: INTERNAL MEDICINE
Payer: MEDICARE

## 2024-10-03 ENCOUNTER — PATIENT OUTREACH (OUTPATIENT)
Dept: ONCOLOGY | Facility: CLINIC | Age: 64
End: 2024-10-03

## 2024-10-03 ENCOUNTER — TELEPHONE (OUTPATIENT)
Dept: GASTROENTEROLOGY | Facility: CLINIC | Age: 64
End: 2024-10-03

## 2024-10-03 VITALS
SYSTOLIC BLOOD PRESSURE: 156 MMHG | DIASTOLIC BLOOD PRESSURE: 96 MMHG | TEMPERATURE: 97.9 F | HEART RATE: 88 BPM | RESPIRATION RATE: 16 BRPM | OXYGEN SATURATION: 94 %

## 2024-10-03 LAB
ALBUMIN SERPL BCG-MCNC: 4.2 G/DL (ref 3.5–5.2)
ALP SERPL-CCNC: 123 U/L (ref 40–150)
ALT SERPL W P-5'-P-CCNC: 19 U/L (ref 0–70)
ANION GAP SERPL CALCULATED.3IONS-SCNC: 13 MMOL/L (ref 7–15)
AST SERPL W P-5'-P-CCNC: 26 U/L (ref 0–45)
BILIRUB SERPL-MCNC: 0.5 MG/DL
BUN SERPL-MCNC: 19.4 MG/DL (ref 8–23)
CALCIUM SERPL-MCNC: 8.9 MG/DL (ref 8.8–10.4)
CHLORIDE SERPL-SCNC: 104 MMOL/L (ref 98–107)
CREAT SERPL-MCNC: 0.8 MG/DL (ref 0.67–1.17)
EGFRCR SERPLBLD CKD-EPI 2021: >90 ML/MIN/1.73M2
ERYTHROCYTE [DISTWIDTH] IN BLOOD BY AUTOMATED COUNT: 13 % (ref 10–15)
GLUCOSE SERPL-MCNC: 106 MG/DL (ref 70–99)
HCO3 SERPL-SCNC: 22 MMOL/L (ref 22–29)
HCT VFR BLD AUTO: 41.5 % (ref 40–53)
HGB BLD-MCNC: 14.2 G/DL (ref 13.3–17.7)
LIPASE SERPL-CCNC: 36 U/L (ref 13–60)
MCH RBC QN AUTO: 31.5 PG (ref 26.5–33)
MCHC RBC AUTO-ENTMCNC: 34.2 G/DL (ref 31.5–36.5)
MCV RBC AUTO: 92 FL (ref 78–100)
PLATELET # BLD AUTO: 193 10E3/UL (ref 150–450)
POTASSIUM SERPL-SCNC: 3.9 MMOL/L (ref 3.4–5.3)
PROT SERPL-MCNC: 6.8 G/DL (ref 6.4–8.3)
RBC # BLD AUTO: 4.51 10E6/UL (ref 4.4–5.9)
SODIUM SERPL-SCNC: 139 MMOL/L (ref 135–145)
UPPER EUS: NORMAL
WBC # BLD AUTO: 5 10E3/UL (ref 4–11)

## 2024-10-03 PROCEDURE — 83690 ASSAY OF LIPASE: CPT | Performed by: INTERNAL MEDICINE

## 2024-10-03 PROCEDURE — 36415 COLL VENOUS BLD VENIPUNCTURE: CPT | Performed by: INTERNAL MEDICINE

## 2024-10-03 PROCEDURE — 43238 EGD US FINE NEEDLE BX/ASPIR: CPT | Performed by: STUDENT IN AN ORGANIZED HEALTH CARE EDUCATION/TRAINING PROGRAM

## 2024-10-03 PROCEDURE — 370N000017 HC ANESTHESIA TECHNICAL FEE, PER MIN: Performed by: INTERNAL MEDICINE

## 2024-10-03 PROCEDURE — 250N000009 HC RX 250: Performed by: INTERNAL MEDICINE

## 2024-10-03 PROCEDURE — 360N000075 HC SURGERY LEVEL 2, PER MIN: Performed by: INTERNAL MEDICINE

## 2024-10-03 PROCEDURE — 258N000003 HC RX IP 258 OP 636

## 2024-10-03 PROCEDURE — 88173 CYTOPATH EVAL FNA REPORT: CPT | Mod: TC | Performed by: INTERNAL MEDICINE

## 2024-10-03 PROCEDURE — 250N000011 HC RX IP 250 OP 636

## 2024-10-03 PROCEDURE — 710N000009 HC RECOVERY PHASE 1, LEVEL 1, PER MIN: Performed by: INTERNAL MEDICINE

## 2024-10-03 PROCEDURE — 710N000012 HC RECOVERY PHASE 2, PER MINUTE: Performed by: INTERNAL MEDICINE

## 2024-10-03 PROCEDURE — 250N000013 HC RX MED GY IP 250 OP 250 PS 637

## 2024-10-03 PROCEDURE — 80053 COMPREHEN METABOLIC PANEL: CPT | Performed by: INTERNAL MEDICINE

## 2024-10-03 PROCEDURE — 85014 HEMATOCRIT: CPT | Performed by: INTERNAL MEDICINE

## 2024-10-03 PROCEDURE — 272N000001 HC OR GENERAL SUPPLY STERILE: Performed by: INTERNAL MEDICINE

## 2024-10-03 PROCEDURE — 250N000025 HC SEVOFLURANE, PER MIN: Performed by: INTERNAL MEDICINE

## 2024-10-03 PROCEDURE — 999N000141 HC STATISTIC PRE-PROCEDURE NURSING ASSESSMENT: Performed by: INTERNAL MEDICINE

## 2024-10-03 PROCEDURE — C1889 IMPLANT/INSERT DEVICE, NOC: HCPCS | Performed by: INTERNAL MEDICINE

## 2024-10-03 PROCEDURE — 250N000009 HC RX 250

## 2024-10-03 PROCEDURE — 88172 CYTP DX EVAL FNA 1ST EA SITE: CPT | Mod: 26 | Performed by: PATHOLOGY

## 2024-10-03 PROCEDURE — 88173 CYTOPATH EVAL FNA REPORT: CPT | Mod: 26 | Performed by: PATHOLOGY

## 2024-10-03 PROCEDURE — 88305 TISSUE EXAM BY PATHOLOGIST: CPT | Mod: 26 | Performed by: PATHOLOGY

## 2024-10-03 RX ORDER — ACETAMINOPHEN 325 MG/1
975 TABLET ORAL ONCE
Status: COMPLETED | OUTPATIENT
Start: 2024-10-03 | End: 2024-10-03

## 2024-10-03 RX ORDER — NALOXONE HYDROCHLORIDE 0.4 MG/ML
0.1 INJECTION, SOLUTION INTRAMUSCULAR; INTRAVENOUS; SUBCUTANEOUS
Status: DISCONTINUED | OUTPATIENT
Start: 2024-10-03 | End: 2024-10-03 | Stop reason: HOSPADM

## 2024-10-03 RX ORDER — NALOXONE HYDROCHLORIDE 0.4 MG/ML
0.2 INJECTION, SOLUTION INTRAMUSCULAR; INTRAVENOUS; SUBCUTANEOUS
Status: DISCONTINUED | OUTPATIENT
Start: 2024-10-03 | End: 2024-10-03 | Stop reason: HOSPADM

## 2024-10-03 RX ORDER — DEXAMETHASONE SODIUM PHOSPHATE 4 MG/ML
4 INJECTION, SOLUTION INTRA-ARTICULAR; INTRALESIONAL; INTRAMUSCULAR; INTRAVENOUS; SOFT TISSUE
Status: DISCONTINUED | OUTPATIENT
Start: 2024-10-03 | End: 2024-10-03 | Stop reason: HOSPADM

## 2024-10-03 RX ORDER — HYDROMORPHONE HCL IN WATER/PF 6 MG/30 ML
0.4 PATIENT CONTROLLED ANALGESIA SYRINGE INTRAVENOUS EVERY 5 MIN PRN
Status: DISCONTINUED | OUTPATIENT
Start: 2024-10-03 | End: 2024-10-03 | Stop reason: HOSPADM

## 2024-10-03 RX ORDER — SODIUM CHLORIDE, SODIUM LACTATE, POTASSIUM CHLORIDE, CALCIUM CHLORIDE 600; 310; 30; 20 MG/100ML; MG/100ML; MG/100ML; MG/100ML
INJECTION, SOLUTION INTRAVENOUS CONTINUOUS
Status: DISCONTINUED | OUTPATIENT
Start: 2024-10-03 | End: 2024-10-03 | Stop reason: HOSPADM

## 2024-10-03 RX ORDER — ONDANSETRON 4 MG/1
4 TABLET, ORALLY DISINTEGRATING ORAL EVERY 6 HOURS PRN
Status: DISCONTINUED | OUTPATIENT
Start: 2024-10-03 | End: 2024-10-03 | Stop reason: HOSPADM

## 2024-10-03 RX ORDER — FLUMAZENIL 0.1 MG/ML
0.2 INJECTION, SOLUTION INTRAVENOUS
Status: DISCONTINUED | OUTPATIENT
Start: 2024-10-03 | End: 2024-10-03 | Stop reason: HOSPADM

## 2024-10-03 RX ORDER — ONDANSETRON 4 MG/1
4 TABLET, ORALLY DISINTEGRATING ORAL EVERY 30 MIN PRN
Status: DISCONTINUED | OUTPATIENT
Start: 2024-10-03 | End: 2024-10-03 | Stop reason: HOSPADM

## 2024-10-03 RX ORDER — ONDANSETRON 2 MG/ML
4 INJECTION INTRAMUSCULAR; INTRAVENOUS EVERY 30 MIN PRN
Status: DISCONTINUED | OUTPATIENT
Start: 2024-10-03 | End: 2024-10-03 | Stop reason: HOSPADM

## 2024-10-03 RX ORDER — OXYCODONE HYDROCHLORIDE 5 MG/1
5 TABLET ORAL
Status: DISCONTINUED | OUTPATIENT
Start: 2024-10-03 | End: 2024-10-03 | Stop reason: HOSPADM

## 2024-10-03 RX ORDER — FENTANYL CITRATE 50 UG/ML
25 INJECTION, SOLUTION INTRAMUSCULAR; INTRAVENOUS EVERY 5 MIN PRN
Status: DISCONTINUED | OUTPATIENT
Start: 2024-10-03 | End: 2024-10-03 | Stop reason: HOSPADM

## 2024-10-03 RX ORDER — NALOXONE HYDROCHLORIDE 0.4 MG/ML
0.4 INJECTION, SOLUTION INTRAMUSCULAR; INTRAVENOUS; SUBCUTANEOUS
Status: DISCONTINUED | OUTPATIENT
Start: 2024-10-03 | End: 2024-10-03 | Stop reason: HOSPADM

## 2024-10-03 RX ORDER — ONDANSETRON 2 MG/ML
4 INJECTION INTRAMUSCULAR; INTRAVENOUS EVERY 6 HOURS PRN
Status: DISCONTINUED | OUTPATIENT
Start: 2024-10-03 | End: 2024-10-03 | Stop reason: HOSPADM

## 2024-10-03 RX ORDER — DEXAMETHASONE SODIUM PHOSPHATE 4 MG/ML
INJECTION, SOLUTION INTRA-ARTICULAR; INTRALESIONAL; INTRAMUSCULAR; INTRAVENOUS; SOFT TISSUE PRN
Status: DISCONTINUED | OUTPATIENT
Start: 2024-10-03 | End: 2024-10-03

## 2024-10-03 RX ORDER — FENTANYL CITRATE 50 UG/ML
INJECTION, SOLUTION INTRAMUSCULAR; INTRAVENOUS PRN
Status: DISCONTINUED | OUTPATIENT
Start: 2024-10-03 | End: 2024-10-03

## 2024-10-03 RX ORDER — LIDOCAINE HYDROCHLORIDE 20 MG/ML
INJECTION, SOLUTION INFILTRATION; PERINEURAL PRN
Status: DISCONTINUED | OUTPATIENT
Start: 2024-10-03 | End: 2024-10-03

## 2024-10-03 RX ORDER — LIDOCAINE 40 MG/G
CREAM TOPICAL
Status: DISCONTINUED | OUTPATIENT
Start: 2024-10-03 | End: 2024-10-03 | Stop reason: HOSPADM

## 2024-10-03 RX ORDER — FENTANYL CITRATE 50 UG/ML
50 INJECTION, SOLUTION INTRAMUSCULAR; INTRAVENOUS EVERY 5 MIN PRN
Status: DISCONTINUED | OUTPATIENT
Start: 2024-10-03 | End: 2024-10-03 | Stop reason: HOSPADM

## 2024-10-03 RX ORDER — ONDANSETRON 2 MG/ML
INJECTION INTRAMUSCULAR; INTRAVENOUS PRN
Status: DISCONTINUED | OUTPATIENT
Start: 2024-10-03 | End: 2024-10-03

## 2024-10-03 RX ORDER — PROPOFOL 10 MG/ML
INJECTION, EMULSION INTRAVENOUS PRN
Status: DISCONTINUED | OUTPATIENT
Start: 2024-10-03 | End: 2024-10-03

## 2024-10-03 RX ORDER — OXYCODONE HYDROCHLORIDE 10 MG/1
10 TABLET ORAL
Status: DISCONTINUED | OUTPATIENT
Start: 2024-10-03 | End: 2024-10-03 | Stop reason: HOSPADM

## 2024-10-03 RX ORDER — HYDROMORPHONE HCL IN WATER/PF 6 MG/30 ML
0.2 PATIENT CONTROLLED ANALGESIA SYRINGE INTRAVENOUS EVERY 5 MIN PRN
Status: DISCONTINUED | OUTPATIENT
Start: 2024-10-03 | End: 2024-10-03 | Stop reason: HOSPADM

## 2024-10-03 RX ADMIN — LIDOCAINE HYDROCHLORIDE 100 MG: 20 INJECTION, SOLUTION INFILTRATION; PERINEURAL at 07:37

## 2024-10-03 RX ADMIN — PROPOFOL 30 MG: 10 INJECTION, EMULSION INTRAVENOUS at 07:53

## 2024-10-03 RX ADMIN — Medication 200 MG: at 08:33

## 2024-10-03 RX ADMIN — FENTANYL CITRATE 100 MCG: 50 INJECTION INTRAMUSCULAR; INTRAVENOUS at 07:37

## 2024-10-03 RX ADMIN — ONDANSETRON 4 MG: 2 INJECTION INTRAMUSCULAR; INTRAVENOUS at 08:31

## 2024-10-03 RX ADMIN — ACETAMINOPHEN 975 MG: 325 TABLET ORAL at 06:16

## 2024-10-03 RX ADMIN — Medication 50 MG: at 07:37

## 2024-10-03 RX ADMIN — PROPOFOL 160 MG: 10 INJECTION, EMULSION INTRAVENOUS at 07:37

## 2024-10-03 RX ADMIN — Medication 20 MG: at 08:06

## 2024-10-03 RX ADMIN — DEXAMETHASONE SODIUM PHOSPHATE 8 MG: 4 INJECTION, SOLUTION INTRA-ARTICULAR; INTRALESIONAL; INTRAMUSCULAR; INTRAVENOUS; SOFT TISSUE at 07:37

## 2024-10-03 RX ADMIN — PROPOFOL 20 MG: 10 INJECTION, EMULSION INTRAVENOUS at 07:57

## 2024-10-03 RX ADMIN — SODIUM CHLORIDE, POTASSIUM CHLORIDE, SODIUM LACTATE AND CALCIUM CHLORIDE: 600; 310; 30; 20 INJECTION, SOLUTION INTRAVENOUS at 07:24

## 2024-10-03 RX ADMIN — PROPOFOL 40 MG: 10 INJECTION, EMULSION INTRAVENOUS at 07:40

## 2024-10-03 ASSESSMENT — ACTIVITIES OF DAILY LIVING (ADL)
ADLS_ACUITY_SCORE: 29

## 2024-10-03 NOTE — ANESTHESIA POSTPROCEDURE EVALUATION
Patient: Walt Estrada    Procedure: Procedure(s):  ENDOSCOPIC ULTRASOUND, ESOPHAGOSCOPY / UPPER GASTROINTESTINAL TRACT with fine needle aspiration       Anesthesia Type:  General    Note:  Disposition: Outpatient   Postop Pain Control: Uneventful            Sign Out: Well controlled pain   PONV: No   Neuro/Psych: Uneventful            Sign Out: Acceptable/Baseline neuro status   Airway/Respiratory: Uneventful            Sign Out: Acceptable/Baseline resp. status   CV/Hemodynamics: Uneventful            Sign Out: Acceptable CV status; No obvious hypovolemia; No obvious fluid overload   Other NRE: NONE   DID A NON-ROUTINE EVENT OCCUR? YES    Event details/Postop Comments:  Brief desaturation during transfer from the OR table to the bed. Pt remained hemodynamically stable during this event.            Last vitals:  Vitals Value Taken Time   /97 10/03/24 0900   Temp 36.8  C (98.3  F) 10/03/24 0850   Pulse 88 10/03/24 0915   Resp 14 10/03/24 0915   SpO2 96 % 10/03/24 0915   Vitals shown include unfiled device data.    Electronically Signed By: Viktor Woodall MD  October 3, 2024  9:15 AM

## 2024-10-03 NOTE — DISCHARGE INSTRUCTIONS
Contacting your Doctor -   To contact a doctor, call Dr Loya at the GI clinic at [phone] or:  [phone] and ask for the resident on call for Gastroenterology (answered 24 hours a day)   Emergency Department:  [facility]: [phone]  [facility]: [phone] 911 if you are in need of immediate or emergent help

## 2024-10-03 NOTE — ANESTHESIA PROCEDURE NOTES
Airway       Patient location during procedure: OR       Procedure Start/Stop Times: 10/3/2024 7:39 AM  Staff -        Anesthesiologist:  Benja Gordon MD       Resident/Fellow: Mari Silverman MD       Performed By: resident and with residents       Procedure performed by resident/fellow/CRNA in presence of a teaching physician.    Consent for Airway        Urgency: elective  Indications and Patient Condition       Indications for airway management: kristan-procedural       Induction type:intravenous       Mask difficulty assessment: 1 - vent by mask    Final Airway Details       Final airway type: endotracheal airway       Successful airway: ETT - single  Endotracheal Airway Details        ETT size (mm): 7.5       Cuffed: yes       Successful intubation technique: video laryngoscopy       VL Blade Size: MAC 4       Grade View of Cords: 3       Adjucts: stylet       Position: Right       Measured from: lips       Secured at (cm): 23       Bite Block used: Green Endo Bite Block.    Post intubation assessment        Placement verified by: capnometry and chest rise        Number of attempts at approach: 1       Number of other approaches attempted: 0       Secured with: tape       Ease of procedure: easy       Dentition: Intact    Medication(s) Administered   Medication Administration Time: 10/3/2024 7:39 AM    Additional Comments       Initial view w/ DL with inadequate visualization of vocal cords. VL w/ MAC 4 with grade 3 view of cords. Would recommend VL for future intubations.

## 2024-10-03 NOTE — BRIEF OP NOTE
[facility]    Brief Operative Note    Pre-operative diagnosis: Pancreatic mass [K86.89]  Post-operative diagnosis Same as pre-operative diagnosis    Procedure: ENDOSCOPIC ULTRASOUND, ESOPHAGOSCOPY / UPPER GASTROINTESTINAL TRACT with fine needle aspiration, N/A - Esophagus    Surgeon: Surgeons and Role:     * Brendon Loya MD - Primary  Anesthesia: General   Estimated Blood Loss: None    Drains: None  Specimens:   ID Type Source Tests Collected by Time Destination   1 : pancreatic head mass Fine Needle Aspiration Pancreas FINE NEEDLE ASPIRATE Brendon Loya MD 10/3/2024  8:20 AM      Findings:   Unchanged pancreatic head mass. Successful EUS biopsy x 4 passes with a 22 ga Acquire S needle. Preliminary cytology suggested adequate cellularity.  Complications: None.  Implants: * No implants in log *    RUBI Loya MD  Professor of Medicine  Division of Gastroenterology, Hepatology and Nutrition  [facility]

## 2024-10-03 NOTE — PROGRESS NOTES
New Patient Oncology Nurse Navigator Note     Referring provider:   Referred By    Provider Department Location Phone   Brendon Loya MD [facility] [phone]        Referred to (specialty): Medical Oncology    Date Referral Received:   9/12/24     Evaluation for :   EUS 9/12 highly suggestive of pancreatic endocrine tumor, path pending     Clinical History (per Nurse review of records provided):    Patient underwent appendectomy for acute appendicitis 8/2/24. He was found to have a pancreatic head mass. He was seen by Dr. Loya and had non-diagnostic EUS on 9/12/24.  He went on to have a Fine Needle Aspiration of the Pancreas with Dr. Loya, on 10/3/24, pathology is pending.      9/12/24  Final Diagnosis  A. PANCREAS, PANCREATIC HEAD MASS, FINE NEEDLE ASPIRATE:  Interpretation:  -Benign pancreatic elements, background intestinal wall contaminants, see comment   Adequacy: Satisfactory for evaluation    Electronically signed by Srinivas Garcia MD on 9/16/2024 at  9:43 AM    Records Location (Care Everywhere, Media, etc.):   Epic      Additional testing needed prior to consult:   Await pathology results of recent procedure 10/3/24   if it confirms Pancreatic Neuroendocrine Tumor:  book w/ Onc, HOLDING 10/25 Dave or any Onc for PNET.     Also run it by Cassi as pt may need Surg Onc as well     Saniya Ernst, RN, BSN  Oncology New Patient Nurse Navigator   [facility]  [phone]

## 2024-10-03 NOTE — ANESTHESIA CARE TRANSFER NOTE
Patient: Walt Estrada    Procedure: Procedure(s):  ENDOSCOPIC ULTRASOUND, ESOPHAGOSCOPY / UPPER GASTROINTESTINAL TRACT with fine needle aspiration       Diagnosis: Pancreatic mass [K86.89]  Diagnosis Additional Information: No value filed.    Anesthesia Type:   General     Note:    Oropharynx: oropharynx clear of all foreign objects and spontaneously breathing  Level of Consciousness: awake and drowsy  Oxygen Supplementation: face mask  Level of Supplemental Oxygen (L/min / FiO2): 6  Independent Airway: airway patency satisfactory and stable  Dentition: dentition unchanged  Vital Signs Stable: post-procedure vital signs reviewed and stable  Report to RN Given: handoff report given  Patient transferred to: PACU    Handoff Report: Identifed the Patient, Reviewed the pertinent medical history, Discussed the surgical course, Identified the Reponsible Provider, Reviewed Intra-OP anesthesia mangement and issues during anesthesia and Allowed opportunity for questions and acknowledgement of understanding      Vitals:  Vitals Value Taken Time   /93 10/03/24 0852   Temp 36.8  C (98.3  F) 10/03/24 0850   Pulse 93 10/03/24 0858   Resp 16 10/03/24 0858   SpO2 99 % 10/03/24 0858   Vitals shown include unfiled device data.    Electronically Signed By: Mari Silverman MD  October 3, 2024  8:58 AM

## 2024-10-03 NOTE — TELEPHONE ENCOUNTER
Shalonda:    See EUS note from today.    Please arrange for outpt medical and surgical oncology consultations.    RUBI Loya MD  Professor of Medicine  Division of Gastroenterology, Hepatology and Nutrition  [facility]

## 2024-10-06 ENCOUNTER — HEALTH MAINTENANCE LETTER (OUTPATIENT)
Age: 64
End: 2024-10-06

## 2024-10-07 ENCOUNTER — PATIENT OUTREACH (OUTPATIENT)
Dept: ONCOLOGY | Facility: CLINIC | Age: 64
End: 2024-10-07
Payer: MEDICARE

## 2024-10-08 NOTE — PROGRESS NOTES
New Patient Oncology Nurse Navigator Note     Referring provider: Dr. Loya     Referring Clinic/Organization: [facility]     Referred to: Hepatobiliary Surgery      Requested provider (if applicable): First available provider    Referral Received: 10/08/24       Evaluation for :  Pancreas mass - biopsies inclusive      Clinical History (per Nurse review of records provided):      See book marked documents:     Referring MD office note  Pathology report  Imaging reports   Procedure report       Allergies   Allergen Reactions    Penicillins Unknown     Other reaction(s): Gastrointestinal        Past Medical History:   Diagnosis Date    Dyslipidemia     Essential hypertension     GERD (gastroesophageal reflux disease)        Past Surgical History:   Procedure Laterality Date    APPENDECTOMY      ENDOSCOPIC ULTRASOUND UPPER GASTROINTESTINAL TRACT (GI) N/A 9/12/2024    Procedure: ENDOSCOPIC ULTRASOUND, ESOPHAGOSCOPY / UPPER GASTROINTESTINAL TRACT (GI) with biopsies and fine needle aspiration;  Surgeon: Brendon Loya MD;  Location: [facility] OR    ENDOSCOPIC ULTRASOUND UPPER GASTROINTESTINAL TRACT (GI) N/A 10/3/2024    Procedure: ENDOSCOPIC ULTRASOUND, ESOPHAGOSCOPY / UPPER GASTROINTESTINAL TRACT with fine needle aspiration;  Surgeon: Brendon Loya MD;  Location: [facility] OR    HERNIA REPAIR      ORTHOPEDIC SURGERY      arm procedure    TONSILLECTOMY         Current Outpatient Medications   Medication Sig Dispense Refill    acetaminophen (TYLENOL) 500 MG tablet Take 1,000 mg by mouth every morning.      aspirin 81 MG EC tablet Take 81 mg by mouth every evening.      Calcium 280 MG TABS Take 600 mg by mouth every evening.      Cholecalciferol (VITAMIN D3) 25 MCG (1000 UT) CAPS Take 1,000 Units by mouth every evening.      Cyanocobalamin (VITAMIN B12) 1000 MCG TBCR Take 1,000 mcg by mouth every 48 hours.      cyclobenzaprine (FLEXERIL) 5 MG tablet Take 5 mg by mouth daily as needed for muscle spasms.       ezetimibe (ZETIA) 10 MG tablet Take 10 mg by mouth every evening.      fish oil-omega-3 fatty acids 1000 MG capsule Take 1 g by mouth every evening.      hydrochlorothiazide (HYDRODIURIL) 25 MG tablet Take 25 mg by mouth every evening.      lisinopril (ZESTRIL) 40 MG tablet Take 40 mg by mouth every evening.      Multiple Vitamin (MULTI VITAMIN) TABS Take 1 tablet by mouth every evening.      omeprazole 20 MG tablet Take 20 mg by mouth every morning.      rosuvastatin (CRESTOR) 20 MG tablet Take 20 mg by mouth every evening.      sertraline (ZOLOFT) 100 MG tablet Take 100 mg by mouth every morning.          There is no problem list on file for this patient.        Clinical Assessment / Barriers to Care (Per Nurse):    None at this time.     Records Location:     [facility] Everywhere     Records Needed:     ABDOMINAL IMAGING (CT SCANS, MRI, US, PET SCANS)  DATING BACK TO 2018      Additional testing needed prior to consult:     NONE AT THIS TIME    Referral updates and Plan:       Consult with Surgical Oncology     Cassi Cabello RN, BSN   Surgical Oncology New Patient Nurse Navigator  [facility]  [phone]

## 2024-10-09 ENCOUNTER — TELEPHONE (OUTPATIENT)
Dept: GASTROENTEROLOGY | Facility: CLINIC | Age: 64
End: 2024-10-09
Payer: MEDICARE

## 2024-10-09 NOTE — TELEPHONE ENCOUNTER
Called and reviewed recent biopsy results with pt and spouse. Again this is unexpectedly non-diagnostic.    They are understanding of the plan at this point for surgical oncology and likely medical oncology consultations.    RUBI Loya MD  Professor of Medicine  Division of Gastroenterology, Hepatology and Nutrition  [facility]

## 2024-10-15 NOTE — PROGRESS NOTES
[facility]  [address] [address]  Phone: [phone]  Fax: [phone]  Department of Surgery  Division of Surgical Oncology    New Patient Consultation    Patient:  Walt Estrada, Date of birth 1960  Date of Visit:  10/21/2024  Referring Provider Brendon Loya      Assessment & Plan      Walt Estrada is a 64 year old male with an as of yet undiagnosed pancreatic lesion    The natural history of pancreatic lesions was discussed with the patient and accompanying family members and/or other guests.    I had a very long conversation with Walt and his guests.  I went through all of his imaging.  It appears he has a vascular, discrete/rounded lesion in the head of the pancreas.  This is not at all consistent with the appearance of pancreas cancer.  This is more consistent with the appearance of pancreatic neuroendocrine tumor.  However, this lit up more on the venous phase of contrast in the arterial phase, so this would be a somewhat atypical appearance for a neuroendocrine tumor.  Regardless, neuroendocrine tumor is certainly the most likely diagnosis based off of this appearance.  Other things could include a vascular anomaly although this was not supported by Dr. Loya's endoscopic images, or some other sort of tumor or metastatic deposit, which are incredibly rare.    I have reviewed this case with Dr. Loya.  He and I both agree that another attempt at a biopsy is not appropriate at this time.  Due to the rather indolent nature of the most likely common diagnosis here, I think we will proceed with short interval follow-up.  Walt is planning to return back to [facility] in early December, at which time I think it is appropriate to obtain another CT scan.  I will see if the dotatate PET scan is possible, but it is unlikely that this will get approved by insurance because we do not have a biopsy confirmed diagnosis of neuroendocrine  tumor.    I made it clear to Walt and his family that the neuroendocrine tumor of this size does not require any intervention at this moment.  It is just is likely to grow as it is likely to shrink, so surveillance and nonoperative management is perfectly appropriate at this time assuming the diagnosis is correct.  He understands and there are no absolutes with this and that we cannot completely rule out the possibility of cancer, but I made it very clear that this in no way appears like a cancer to me.    All questions were answered to their apparent satisfaction, and contact information was provided should additional questions or concerns arise.    Plan Summary:  -Tentative plan for DOTATATE PET, although this is unlikely to be approved without an existing biopsy. As a result, we will likely just get a CT scan, pancreas protocol, and in early December with a follow-up visit with me shortly thereafter.    Srinivas Interiano MD MPHS    ADDENDUM 335pm:  The patient sent me scanned results from his chest CT on Friday.  There is concern for an underlying lung mass.  My initial review of his images only had the lung windows which showed a very linear appearing scar.  It is possible that there is an underlying mass under here that led to atelectasis.  I do now have multiple other images and I do see the lung mass being described.  In addition, they are commenting on multiple hepatic hypodensities.  I can see these on the images obtained and now available.  There are various hypervascular lesions and hypodensities.  The hypervascular lesions that are similar enhancement pattern to the pancreas lesion appear to just be vascular lesions, so I do not know how to explain the hypodensities in the liver which are certainly not lighting up similar to the pancreas lesion.  It is very possible there are multiple problems all happening at the same time.    I will arrange for a IR lung biopsy as well as an urgent liver MRI, all to be  done here.  I called the patient and his wife and spoke to them about this and how this is a significant change from my documentation above.  We will cancel any plans for surveillance imaging several months from now since we will be working on these things up in a much more expedited fashion.  He does understand that there is now a greater likelihood for possibility of cancer which might be a lung primary cancer symptoms are very very common.  I do not have enough information right now to speculate as to the significance of his hepatic findings.  I will arrange for the studies to be done, I will call his local provider, and I will give him a phone call once these results are back.        Medical Decision Making    Today's visit was centered around an unknown diagnosis, most likely a pancreatic neuroendocrine tumor. The risk of additional morbidity or mortality with or without further treatment is unclear but could be moderate. Total time spent was 75 minutes, including but not limited to patient-facing time, chart review, review of tests/studies as noted above, and care coordination.             History of Present Illness     Pertinent history obtained from: chart review and patient    CT scan obtained for appendicitis showed a pancreas mass.  Patient underwent uneventful appendectomy.  Pathology was consistent with acute appendicitis.  A follow-up CT scan was later done that showed a very discrete hyperenhancing lesion within the head of the pancreas.    EUS was done on 9/12/24. 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 GEJ was Hill grade IV. A 12mm hypoechoic mass was seen in the head of pancreas, in contact with but not dilating the PD. FNA was benign pancreas with intestinal contamination.     EUS was repeated on 10/3/24. Similar findings. Cytology intra procedurally was adequate cellularity, but final pathology showed benign parenchyma again. The overall impression was that of a pNET based on  "well circumscribed borders.     Walt does have occasional sweating but has no issues with blood sugar control he is aware of, no new skin rashes, and no persistent diarrhea.    Past Medical History, Past Surgical History, and Family History reviewed - see appropriate tabs in EMR    Physical Exam     Vital signs:  BP (!) 156/105 (BP Location: Right arm, Patient Position: Sitting, Cuff Size: Adult Regular)   Pulse 103   Temp 98.4  F (36.9  C) (Oral)   Resp 18   Ht 1.797 m (5' 10.75\")   Wt 104.7 kg (230 lb 12.8 oz)   SpO2 98%   BMI 32.42 kg/m          General: NAD, alert, oriented  HEENT: no scleral icterus, EOMI  Pulm: non-labored breathing, equal excursion bilaterally  CV: regular rate and rhythm  Abdomen: soft, non-tender, non-distended   Extremities: warm, no edema  Skin: no apparent rashes or lesions  Neuro: no significant functional deficit, able to ambulate to and from exam table without assistance    Data   Laboratory data and imaging listed below were reviewed as part of this encounter.       Imagin CT scans from August both reviewed                 "

## 2024-10-17 NOTE — TELEPHONE ENCOUNTER
RECORDS STATUS - ALL OTHER DIAGNOSIS      Action    Action Taken 10/17/24  Spoke rosy/ Joe @ [facility] - advised they only have one abdominal image, previously uploaded to PACS, report is in Epic.   11:39 AM      RECORDS RECEIVED FROM: [facility]   DATE RECEIVED: 10/17   NOTES STATUS DETAILS   OFFICE NOTE from referring provider Krishna Loya   DISCHARGE SUMMARY from hospital [facility] 10/3/24, 9/12/24   DISCHARGE REPORT from the ER CE -  8/2/24   OPERATIVE REPORT Epic/Elia [facility]  10/3/24, 9/12/24: EUS/EGD      8/2/24: Laparoscopic Appendectomy    [facility]  1/22/19: Colonoscopy   MEDICATION LIST [facility]    LABS     PATHOLOGY REPORTS Epic, [facility] (reports in CE) [facility]  10/3/24: [id number]  9/12/24: [id number]  9/12/24: [id number]    /[facility]  8/2/24: [id number]   ANYTHING RELATED TO DIAGNOSIS Epic 10/3/24   IMAGING (NEED IMAGES & REPORT)     CT SCANS PACS [facility]  8/21/24      8/2/24

## 2024-10-18 ENCOUNTER — TRANSFERRED RECORDS (OUTPATIENT)
Dept: HEALTH INFORMATION MANAGEMENT | Facility: CLINIC | Age: 64
End: 2024-10-18
Payer: MEDICARE

## 2024-10-21 ENCOUNTER — PRE VISIT (OUTPATIENT)
Dept: SURGERY | Facility: CLINIC | Age: 64
End: 2024-10-21
Payer: MEDICARE

## 2024-10-21 ENCOUNTER — ONCOLOGY VISIT (OUTPATIENT)
Dept: SURGERY | Facility: CLINIC | Age: 64
End: 2024-10-21
Attending: INTERNAL MEDICINE
Payer: MEDICARE

## 2024-10-21 VITALS
HEART RATE: 103 BPM | BODY MASS INDEX: 32.31 KG/M2 | TEMPERATURE: 98.4 F | OXYGEN SATURATION: 98 % | RESPIRATION RATE: 18 BRPM | SYSTOLIC BLOOD PRESSURE: 156 MMHG | HEIGHT: 71 IN | WEIGHT: 230.8 LBS | DIASTOLIC BLOOD PRESSURE: 105 MMHG

## 2024-10-21 DIAGNOSIS — R93.2 ABNORMAL FINDINGS ON DIAGNOSTIC IMAGING OF LIVER AND BILIARY TRACT: ICD-10-CM

## 2024-10-21 DIAGNOSIS — K86.89 PANCREATIC MASS: Primary | ICD-10-CM

## 2024-10-21 DIAGNOSIS — K76.9 LIVER LESION: ICD-10-CM

## 2024-10-21 DIAGNOSIS — K86.9 LESION OF PANCREAS: Primary | ICD-10-CM

## 2024-10-21 DIAGNOSIS — R91.8 MASS OF UPPER LOBE OF RIGHT LUNG: ICD-10-CM

## 2024-10-21 PROCEDURE — G0463 HOSPITAL OUTPT CLINIC VISIT: HCPCS | Performed by: SURGERY

## 2024-10-21 PROCEDURE — 99417 PROLNG OP E/M EACH 15 MIN: CPT | Performed by: SURGERY

## 2024-10-21 PROCEDURE — 99205 OFFICE O/P NEW HI 60 MIN: CPT | Performed by: SURGERY

## 2024-10-21 ASSESSMENT — PAIN SCALES - GENERAL: PAINLEVEL: MILD PAIN (2)

## 2024-10-21 NOTE — NURSING NOTE
"Oncology Rooming Note    October 21, 2024 8:13 AM   Walt Estrada is a 64 year old male who presents for:    Chief Complaint   Patient presents with    Oncology Clinic Visit     Pancreatic mass     Initial Vitals: BP (!) 166/102 (BP Location: Right arm, Patient Position: Sitting, Cuff Size: Adult Regular)   Pulse 103   Temp 98.4  F (36.9  C) (Oral)   Resp 18   Ht 1.797 m (5' 10.75\")   Wt 104.7 kg (230 lb 12.8 oz)   SpO2 98%   BMI 32.42 kg/m   Estimated body mass index is 32.42 kg/m  as calculated from the following:    Height as of this encounter: 1.797 m (5' 10.75\").    Weight as of this encounter: 104.7 kg (230 lb 12.8 oz). Body surface area is 2.29 meters squared.  Mild Pain (2) Comment: Data Unavailable   No LMP for male patient.  Allergies reviewed: Yes  Medications reviewed: Yes    Medications: Medication refills not needed today.  Pharmacy name entered into EPIC:    [facility] - [place], [place] - [address] [place] [address] [id number] [address] [facility]    Frailty Screening:   Is the patient here for a new oncology consult visit in cancer care? 2. No      Clinical concerns: Mild pain in chest; discuss w/ pt. BP: 166/102; 2nd reading 156/105      Miya Guzmán, EMT  10/21/2024            "

## 2024-10-21 NOTE — PATIENT INSTRUCTIONS
Return visit with Dr. Interiano on 12/6/2024 at [facility]     PET Dotatate prior to 12/6 visit with Dr. Interiano. If declined by insurance, will schedule for CT C/A/P.

## 2024-10-21 NOTE — LETTER
10/21/2024      Walt Estrada  [address]  [place] [address]      Dear Colleague,    Thank you for referring your patient, Walt Estrada, to the [facility]. Please see a copy of my visit note below.      [facility]  [address] [address]  Phone: [phone]  Fax: [phone]  Department of Surgery  Division of Surgical Oncology    New Patient Consultation    Patient:  Walt Estrada, Date of birth 1960  Date of Visit:  10/21/2024  Referring Provider Brendon Loya      Assessment & Plan     Walt Estrada is a 64 year old male with an as of yet undiagnosed pancreatic lesion    The natural history of pancreatic lesions was discussed with the patient and accompanying family members and/or other guests.    I had a very long conversation with Walt and his guests.  I went through all of his imaging.  It appears he has a vascular, discrete/rounded lesion in the head of the pancreas.  This is not at all consistent with the appearance of pancreas cancer.  This is more consistent with the appearance of pancreatic neuroendocrine tumor.  However, this lit up more on the venous phase of contrast in the arterial phase, so this would be a somewhat atypical appearance for a neuroendocrine tumor.  Regardless, neuroendocrine tumor is certainly the most likely diagnosis based off of this appearance.  Other things could include a vascular anomaly although this was not supported by Dr. Loya's endoscopic images, or some other sort of tumor or metastatic deposit, which are incredibly rare.    I have reviewed this case with Dr. Loya.  He and I both agree that another attempt at a biopsy is not appropriate at this time.  Due to the rather indolent nature of the most likely common diagnosis here, I think we will proceed with short interval follow-up.  Walt is planning to return back to [facility] in early December, at which time I think it  is appropriate to obtain another CT scan.  I will see if the dotatate PET scan is possible, but it is unlikely that this will get approved by insurance because we do not have a biopsy confirmed diagnosis of neuroendocrine tumor.    I made it clear to Walt and his family that the neuroendocrine tumor of this size does not require any intervention at this moment.  It is just is likely to grow as it is likely to shrink, so surveillance and nonoperative management is perfectly appropriate at this time assuming the diagnosis is correct.  He understands and there are no absolutes with this and that we cannot completely rule out the possibility of cancer, but I made it very clear that this in no way appears like a cancer to me.    All questions were answered to their apparent satisfaction, and contact information was provided should additional questions or concerns arise.    Plan Summary:  -Tentative plan for DOTATATE PET, although this is unlikely to be approved without an existing biopsy. As a result, we will likely just get a CT scan, pancreas protocol, and in early December with a follow-up visit with me shortly thereafter.    Srinivas Interiano MD MPHS        Medical Decision Making   Today's visit was centered around an unknown diagnosis, most likely a pancreatic neuroendocrine tumor. The risk of additional morbidity or mortality with or without further treatment is unclear but could be moderate. Total time spent was 75 minutes, including but not limited to patient-facing time, chart review, review of tests/studies as noted above, and care coordination.             History of Present Illness    Pertinent history obtained from: chart review and patient    CT scan obtained for appendicitis showed a pancreas mass.  Patient underwent uneventful appendectomy.  Pathology was consistent with acute appendicitis.  A follow-up CT scan was later done that showed a very discrete hyperenhancing lesion within the head of the  "pancreas.    EUS was done on 24. 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 GEJ was Hill grade IV. A 12mm hypoechoic mass was seen in the head of pancreas, in contact with but not dilating the PD. FNA was benign pancreas with intestinal contamination.     EUS was repeated on 10/3/24. Similar findings. Cytology intra procedurally was adequate cellularity, but final pathology showed benign parenchyma again. The overall impression was that of a pNET based on well circumscribed borders.     Walt does have occasional sweating but has no issues with blood sugar control he is aware of, no new skin rashes, and no persistent diarrhea.    Past Medical History, Past Surgical History, and Family History reviewed - see appropriate tabs in EMR    Physical Exam    Vital signs:  BP (!) 156/105 (BP Location: Right arm, Patient Position: Sitting, Cuff Size: Adult Regular)   Pulse 103   Temp 98.4  F (36.9  C) (Oral)   Resp 18   Ht 1.797 m (5' 10.75\")   Wt 104.7 kg (230 lb 12.8 oz)   SpO2 98%   BMI 32.42 kg/m          General: NAD, alert, oriented  HEENT: no scleral icterus, EOMI  Pulm: non-labored breathing, equal excursion bilaterally  CV: regular rate and rhythm  Abdomen: soft, non-tender, non-distended   Extremities: warm, no edema  Skin: no apparent rashes or lesions  Neuro: no significant functional deficit, able to ambulate to and from exam table without assistance    Data  Laboratory data and imaging listed below were reviewed as part of this encounter.       Imagin CT scans from August both reviewed                   Again, thank you for allowing me to participate in the care of your patient.        Sincerely,        Srinivas Interiano MD  "

## 2024-10-22 ENCOUNTER — ANCILLARY PROCEDURE (OUTPATIENT)
Dept: MRI IMAGING | Facility: CLINIC | Age: 64
End: 2024-10-22
Attending: SURGERY
Payer: MEDICARE

## 2024-10-22 DIAGNOSIS — K76.9 LIVER LESION: ICD-10-CM

## 2024-10-22 DIAGNOSIS — K86.9 LESION OF PANCREAS: ICD-10-CM

## 2024-10-22 DIAGNOSIS — R91.8 MASS OF UPPER LOBE OF RIGHT LUNG: ICD-10-CM

## 2024-10-22 PROCEDURE — 74183 MRI ABD W/O CNTR FLWD CNTR: CPT | Mod: MG | Performed by: RADIOLOGY

## 2024-10-22 PROCEDURE — A9585 GADOBUTROL INJECTION: HCPCS | Mod: JZ | Performed by: RADIOLOGY

## 2024-10-22 PROCEDURE — G1010 CDSM STANSON: HCPCS | Performed by: RADIOLOGY

## 2024-10-22 RX ORDER — LORAZEPAM 0.5 MG/1
0.5 TABLET ORAL PRN
Qty: 2 TABLET | Refills: 0 | Status: SHIPPED | OUTPATIENT
Start: 2024-10-22

## 2024-10-22 RX ORDER — GADOBUTROL 604.72 MG/ML
10 INJECTION INTRAVENOUS ONCE
Status: COMPLETED | OUTPATIENT
Start: 2024-10-22 | End: 2024-10-22

## 2024-10-22 RX ADMIN — GADOBUTROL 10 ML: 604.72 INJECTION INTRAVENOUS at 16:39

## 2024-10-22 NOTE — PROGRESS NOTES
Virtual Visit Details    Type of service:  Telephone Visit   Phone call duration: 20 minutes   Originating Location (pt. Location): Home    Distant Location (provider location):  On-site                    INTERVENTIONAL RADIOLOGY CLINIC NOTE    Patient Name:  Walt Estrada  Date of Visit: 10/22/2024  Referring Provider: Dr. Interiano  REASON FOR VISIT:  Lung biopsy discussion, right    ASSESSMENT:  Summary: 64 year old male with a PMH of Billy's esophagus, pancreas lesion concerns for neuroendocrine tumor vs other, EUS 10/3/24 non diagnotic, plan for dotatate PET CT December 6,2024, lung and liver lesions on CT 10/18/24. IR has been requested to pursue a CT guided right lung mass biopsy.     #Right lung lesion: Patient is not yet scheduled for his CT lung mediastinum biopsy yet. He denies SOB, CP, hemoptysis, or lung issues. Aspirin will need to be held x 5 days.   #Liver lesions: Family has questions as far as what the MRI shows from yesterday on 10/22/24. Family curious if liver biopsy will be required as well. I informed family that I would follow up on liver MRI and discuss with Dr. Interiano. If indicated, a new consult will need to be placed for liver biopsy.   #Elevated PHQ-9: As part of patient's screening assessment for depression, he had an elevated PHQ-9 score, and a mental health referral was placed.     PLAN:  -Hold ASA x5 days prior to procedure  -The patient's medical data, including pertinent imaging, was reviewed.    -Education was given on the planned procedure and why it was requested, including a detailed description of interventional radiology's role.    -The use of moderate sedation was reviewed.    -Biopsy procedure along with risks, benefits of the procedure and sedation as well as how pathology results will be communicated have been discussed with the patient. Risk of lung biopsy was discussed which includes but is not limited to post procedure pain, bleeding that may require blood transfusion  or procedures to stop the bleeding, infection that may require treatment with medications, injury to adjacent nerves, vessels or organ systems, non diagnostic sample that may require repeat biopsy, injury to the lung that may require chest tube placement and overnight admission with potential for specialist consultation/intervention and the possibility of death.   -Patient is encouraged to pack overnight bag in the event that admission to hospital is needed.    -No further imaging will be necessary prior to the procedure.  -Updated labs can be checked the day of the procedure.    - The patient will need to hold their ASA x5 days prior to the procedure.    -Mental health referral placed  -Staff message sent to Daily Norman RN and Dr. Interiano regarding placing a new referral for liver lesions if indicated.  -All questions and concerns are addressed.  Patient verbalized understanding of procedure and instructions.   Formal written consent to be obtained the day of procedure.         Veronika Weaver PA-C  Interventional Radiology  IR Nurse Triage: [phone]  The IR outpatient  and can be reached at [phone].       ADDENDUM: 10/24/24 8206  Discussed patient's case with Dr. Interiano, no indication for biopsy/suspicious liver lesions at this time.   Veronika Weaver PA-C      ========================================================================    HISTORY OF PRESENT ILLNESS:  Walt Estrada is a 64 year old male with a PMH of Billy's esophagus, pancreas lesion concerns for neuroendocrine tumor vs other, EUS 10/3/24 non diagnotic, plan for dotatate PET CT December 6,2024, lung and liver lesions on CT 10/18/24. IR has been requested to pursue a CT guided right lung mass biopsy.     Mr. Estrada presents today for discussion of the lung biopsy. Patient has yet to be rescheduled. Pt is able to lay flat, on side or prone. He has been coughing intermittently. He reports that it occurs once an hour. Denies any chest  pain, SOB, bleeding history, hemoptysis, history of issues with fentanyl or versed, sleep apnea, asthma, or issues with receiving blood transfusions.      PAST MEDICAL HISTORY:  Past Medical History:   Diagnosis Date    BCC (basal cell carcinoma), face     Dyslipidemia     Essential hypertension     GERD (gastroesophageal reflux disease)         PAST SURGICAL HISTORY:  Past Surgical History:   Procedure Laterality Date    APPENDECTOMY      ENDOSCOPIC ULTRASOUND UPPER GASTROINTESTINAL TRACT (GI) N/A 09/12/2024    Procedure: ENDOSCOPIC ULTRASOUND, ESOPHAGOSCOPY / UPPER GASTROINTESTINAL TRACT (GI) with biopsies and fine needle aspiration;  Surgeon: Brendon Loya MD;  Location: [facility] OR    ENDOSCOPIC ULTRASOUND UPPER GASTROINTESTINAL TRACT (GI) N/A 10/03/2024    Procedure: ENDOSCOPIC ULTRASOUND, ESOPHAGOSCOPY / UPPER GASTROINTESTINAL TRACT with fine needle aspiration;  Surgeon: Brendon Loya MD;  Location: [facility] OR    HERNIA REPAIR      ORTHOPEDIC SURGERY      Elbow surgery    TONSILLECTOMY         PAST SOCIAL HISTORY  Social History     Socioeconomic History    Marital status:      Spouse name: Not on file    Number of children: Not on file    Years of education: Not on file    Highest education level: Not on file   Occupational History    Not on file   Tobacco Use    Smoking status: Former     Types: Cigarettes    Smokeless tobacco: Never    Tobacco comments:     Quit 2004   Substance and Sexual Activity    Alcohol use: Not Currently     Comment: quit 07/01/23 previously heavy use of alcohol for 10 years    Drug use: Never    Sexual activity: Not on file   Other Topics Concern    Not on file   Social History Narrative    Not on file     Social Determinants of Health     Financial Resource Strain: High Risk (1/1/2022)    Received from [facility]    Financial Resource Strain     Difficulty of Paying Living Expenses: Not on file     Difficulty of Paying Living Expenses:  Not on file   Food Insecurity: Not on file   Transportation Needs: Not on file   Physical Activity: Not on file   Stress: Not on file   Social Connections: Unknown (1/1/2022)    Received from [facility]    Social Connections     Frequency of Communication with Friends and Family: Not on file   Interpersonal Safety: Low Risk  (10/3/2024)    Interpersonal Safety     Do you feel physically and emotionally safe where you currently live?: Yes     Within the past 12 months, have you been hit, slapped, kicked or otherwise physically hurt by someone?: No     Within the past 12 months, have you been humiliated or emotionally abused in other ways by your partner or ex-partner?: No   Housing Stability: Not on file       MEDICATIONS:  Current Outpatient Medications   Medication Sig Dispense Refill    acetaminophen (TYLENOL) 500 MG tablet Take 1,000 mg by mouth every morning.      aspirin 81 MG EC tablet Take 81 mg by mouth every evening.      Calcium 280 MG TABS Take 600 mg by mouth every evening.      Cholecalciferol (VITAMIN D3) 25 MCG (1000 UT) CAPS Take 1,000 Units by mouth every evening.      Cyanocobalamin (VITAMIN B12) 1000 MCG TBCR Take 1,000 mcg by mouth every 48 hours.      cyclobenzaprine (FLEXERIL) 5 MG tablet Take 5 mg by mouth daily as needed for muscle spasms.      ezetimibe (ZETIA) 10 MG tablet Take 10 mg by mouth every evening.      fish oil-omega-3 fatty acids 1000 MG capsule Take 1 g by mouth every evening.      hydrochlorothiazide (HYDRODIURIL) 25 MG tablet Take 25 mg by mouth every evening.      lisinopril (ZESTRIL) 40 MG tablet Take 40 mg by mouth every evening.      LORazepam (ATIVAN) 0.5 MG tablet Take 1 tablet (0.5 mg) by mouth as needed for anxiety. Bring both tablets with you to your appointment and take when instructed by staff. 2 tablet 0    Multiple Vitamin (MULTI VITAMIN) TABS Take 1 tablet by mouth every evening.      omeprazole 20 MG tablet Take 20 mg by mouth  "every morning.      rosuvastatin (CRESTOR) 20 MG tablet Take 20 mg by mouth every evening.      sertraline (ZOLOFT) 100 MG tablet Take 100 mg by mouth every morning.       No current facility-administered medications for this visit.       ALLERGIES:     Allergies   Allergen Reactions    Penicillins Unknown     Other reaction(s): Gastrointestinal     LABORATORY RESULTS:  Lab Results   Component Value Date    WBC 5.0 10/03/2024     Lab Results   Component Value Date    HGB 14.2 10/03/2024     Lab Results   Component Value Date     10/03/2024     No results found for: \"INR\"     DIAGNOSTIC IMAGING:      =====    A total of 20 minutes was spent with the patient.  Greater than 50% of the time was spent in counseling, education, and coordination of care.      CC:  1) Referring Provider  Srinivas Interiano MD  [address] [place] [address], [facility] [address]  [place] [address]       "

## 2024-10-22 NOTE — CONSULTS
Outpatient IR Referral  10/22/24    Referring Provider: Jaydon Savage  IR Referral Request: lung nodule biopsy, pancreas lesion with lung lesion   Recommendations/Plan:  CT guided lung right mass biopsy.  See CT 10/18/24 series 4 image  33  Surg path and flow entered under referrer.    Case and imaging was reviewed with Dr. Kash Ochoa MD.  IR recommendations also relayed Epic messaging.    ASA hold x 5 days prior to biopsy if approved.     IR referral converted to procedure order.      Brief History:    Walt Estrada is a 64 year old male with history of Billy's esophagus, pancreas lesion concerns for neuroendocrine tumor vs other, EUS 10/3/24 non diagnotic, plan for dotatate PET CT December 6,2024, lung and liver lesions on CT 10/18/24. MRI liver ordered    Pertinent Medications:    Current Outpatient Medications   Medication Sig Dispense Refill    acetaminophen (TYLENOL) 500 MG tablet Take 1,000 mg by mouth every morning.      aspirin 81 MG EC tablet Take 81 mg by mouth every evening.      Calcium 280 MG TABS Take 600 mg by mouth every evening.      Cholecalciferol (VITAMIN D3) 25 MCG (1000 UT) CAPS Take 1,000 Units by mouth every evening.      Cyanocobalamin (VITAMIN B12) 1000 MCG TBCR Take 1,000 mcg by mouth every 48 hours.      cyclobenzaprine (FLEXERIL) 5 MG tablet Take 5 mg by mouth daily as needed for muscle spasms.      ezetimibe (ZETIA) 10 MG tablet Take 10 mg by mouth every evening.      fish oil-omega-3 fatty acids 1000 MG capsule Take 1 g by mouth every evening.      hydrochlorothiazide (HYDRODIURIL) 25 MG tablet Take 25 mg by mouth every evening.      lisinopril (ZESTRIL) 40 MG tablet Take 40 mg by mouth every evening.      Multiple Vitamin (MULTI VITAMIN) TABS Take 1 tablet by mouth every evening.      omeprazole 20 MG tablet Take 20 mg by mouth every morning.      rosuvastatin (CRESTOR) 20 MG tablet Take 20 mg by mouth every evening.      sertraline (ZOLOFT) 100 MG tablet Take 100 mg by  "mouth every morning.       No current facility-administered medications for this visit.       Pertinent Imaging Reviewed:  CT [facility] 10/18/24           Most Recent Labs:  Lab Results   Component Value Date    WBC 5.0 10/03/2024     Lab Results   Component Value Date    RBC 4.51 10/03/2024     Lab Results   Component Value Date    HGB 14.2 10/03/2024     Lab Results   Component Value Date    HCT 41.5 10/03/2024     Lab Results   Component Value Date     10/03/2024    Last Comprehensive Metabolic Panel:  Lab Results   Component Value Date     10/03/2024    POTASSIUM 3.9 10/03/2024    CHLORIDE 104 10/03/2024    CO2 22 10/03/2024    ANIONGAP 13 10/03/2024     (H) 10/03/2024    BUN 19.4 10/03/2024    CR 0.80 10/03/2024    GFRESTIMATED >90 10/03/2024    LIZA 8.9 10/03/2024      No results found for: \"INR\"       If requesting team would like sample sent for anything else please use the IR order set \"IR RAD Biopsy or Fluid Aspirate Specimens\" to select your necessary diagnostic labs and pend for admission.     If there are labs you desire that are not found in this order set or you have questions regarding specific diagnostic labs please call the associated lab personnel.     Clinic visit with IR provider will be coordinated to discuss biopsy procedure, risks and benefits as needed.     MATEO Rosa CNP  Interventional Radiology   [phone] (IR RN triage)   "

## 2024-10-23 ENCOUNTER — VIRTUAL VISIT (OUTPATIENT)
Dept: VASCULAR SURGERY | Facility: CLINIC | Age: 64
End: 2024-10-23
Payer: MEDICARE

## 2024-10-23 VITALS — BODY MASS INDEX: 31.5 KG/M2 | HEIGHT: 71 IN | WEIGHT: 225 LBS

## 2024-10-23 DIAGNOSIS — Z13.31 POSITIVE SCREENING FOR DEPRESSION ON 9-ITEM PATIENT HEALTH QUESTIONNAIRE (PHQ-9): ICD-10-CM

## 2024-10-23 DIAGNOSIS — R91.1 LESION OF RIGHT LUNG: Primary | ICD-10-CM

## 2024-10-23 DIAGNOSIS — K76.9 LESION OF LIVER: ICD-10-CM

## 2024-10-23 PROCEDURE — 99442 PR PHYSICIAN TELEPHONE EVALUATION 11-20 MIN: CPT | Mod: 93 | Performed by: PHYSICIAN ASSISTANT

## 2024-10-23 ASSESSMENT — PATIENT HEALTH QUESTIONNAIRE - PHQ9: SUM OF ALL RESPONSES TO PHQ QUESTIONS 1-9: 15

## 2024-10-23 NOTE — NURSING NOTE
Patient scored 15 on PHQ-9, Mental Health Referral attached.       Depression Response    Patient completed the PHQ-9 assessment for depression and scored >9? Yes  Question 9 on the PHQ-9 was positive for suicidality? No  Does patient have current mental health provider? No    Is this a virtual visit? Yes   Does patient have suicidal ideation (positive question 9)? Mental Health Referral attached.     I personally notified the following: visit provider   Put the PHQ-9 score in MSG Column for awareness.            Current patient location: [address] [place] [id number]    Is the patient currently in the state of [place]? YES    Visit mode:TELEPHONE    If the visit is dropped, the patient can be reconnected by: TELEPHONE VISIT: Phone number:   Telephone Information:   Mobile [phone]   Mobile Not on file.       Will anyone else be joining the visit? Linda-Pt's Wife, Abraham-Pt's Son  (If patient encounters technical issues they should call [phone])    Are changes needed to the allergy or medication list? Pt stated no med changes    Are refills needed on medications prescribed by this physician? NO    Rooming Documentation:  Not applicable    Reason for visit: Consult (MRI results, biopsy on lung, liver, future)    Sheridan FOX

## 2024-10-23 NOTE — PATIENT INSTRUCTIONS
INTERVENTIONAL RADIOLOGY CLINIC AFTER VISIT SUMMARY:    Dear Walt Estrada,    Thank you for choosing the [facility]. I would like to highlight some of the important information from our visit.     1) Your IR biopsy has not been scheduled yet.  2) Relevant medication holds include: Aspirin for 5 days prior to procedure.  3) For sedation purposes; no solid foods or milk products for 8 hours prior to the procedure.  You may have clear liquids up to 2 hours prior to the procedure.  4) The procedure will be in interventional radiology (IR); arrive in the Gold Waiting room at your scheduled arrival time on the day of your procedure.  This is on the second floor of the hospital, located down the gandhi from the main hospital lobby.  5) If sedation is used; a responsible adult must accompany you after the procedure.  Hospital policy requires you not drive after sedation is administered.  6) Antibacterial scrub; 2 times the day before the procedure and once the day of the procedure. Clean mid chest to earlobes, both sides of the neck and chest. Place scrub on wet wash cloth, scrub on and leave for 5 minutes. Wash off with a clean wash cloth and pat dry skin, or shower.    Medicines to hold:    Aspirin is held for 5 days prior to the scheduled procedure.      Please do not hesitate to contact our department if you have any further questions or concerns.     Sincerely,      Veronika Weaver PA-C    [facility]  [address] [place] [address], [id number]  [phone] (IR Nurse Triage Line)

## 2024-10-24 ENCOUNTER — MYC MEDICAL ADVICE (OUTPATIENT)
Dept: INTERVENTIONAL RADIOLOGY/VASCULAR | Facility: CLINIC | Age: 64
End: 2024-10-24
Payer: MEDICARE

## 2024-10-28 ENCOUNTER — TUMOR CONFERENCE (OUTPATIENT)
Dept: ONCOLOGY | Facility: CLINIC | Age: 64
End: 2024-10-28
Payer: MEDICARE

## 2024-10-30 ENCOUNTER — APPOINTMENT (OUTPATIENT)
Dept: GENERAL RADIOLOGY | Facility: CLINIC | Age: 64
End: 2024-10-30
Attending: STUDENT IN AN ORGANIZED HEALTH CARE EDUCATION/TRAINING PROGRAM
Payer: MEDICARE

## 2024-10-30 ENCOUNTER — APPOINTMENT (OUTPATIENT)
Dept: INTERVENTIONAL RADIOLOGY/VASCULAR | Facility: CLINIC | Age: 64
End: 2024-10-30
Attending: SURGERY
Payer: MEDICARE

## 2024-10-30 ENCOUNTER — APPOINTMENT (OUTPATIENT)
Dept: MEDSURG UNIT | Facility: CLINIC | Age: 64
End: 2024-10-30
Attending: SURGERY
Payer: MEDICARE

## 2024-10-30 ENCOUNTER — HOSPITAL ENCOUNTER (OUTPATIENT)
Facility: CLINIC | Age: 64
Discharge: HOME | End: 2024-10-30
Attending: SURGERY | Admitting: RADIOLOGY
Payer: MEDICARE

## 2024-10-30 VITALS
WEIGHT: 230.16 LBS | RESPIRATION RATE: 10 BRPM | SYSTOLIC BLOOD PRESSURE: 178 MMHG | HEART RATE: 84 BPM | TEMPERATURE: 98.4 F | OXYGEN SATURATION: 98 % | HEIGHT: 71 IN | BODY MASS INDEX: 32.22 KG/M2 | DIASTOLIC BLOOD PRESSURE: 117 MMHG

## 2024-10-30 DIAGNOSIS — R91.8 MASS OF UPPER LOBE OF RIGHT LUNG: ICD-10-CM

## 2024-10-30 DIAGNOSIS — K76.9 LIVER LESION: ICD-10-CM

## 2024-10-30 DIAGNOSIS — K86.9 LESION OF PANCREAS: ICD-10-CM

## 2024-10-30 LAB
ATRIAL RATE - MUSE: 72 BPM
DIASTOLIC BLOOD PRESSURE - MUSE: NORMAL MMHG
INR PPP: 0.98 (ref 0.85–1.15)
INTERPRETATION ECG - MUSE: NORMAL
P AXIS - MUSE: 39 DEGREES
PATH REPORT.COMMENTS IMP SPEC: NORMAL
PATH REPORT.FINAL DX SPEC: NORMAL
PATH REPORT.MICROSCOPIC SPEC OTHER STN: NORMAL
PATH REPORT.RELEVANT HX SPEC: NORMAL
PR INTERVAL - MUSE: 152 MS
QRS DURATION - MUSE: 92 MS
QT - MUSE: 390 MS
QTC - MUSE: 427 MS
R AXIS - MUSE: 38 DEGREES
SYSTOLIC BLOOD PRESSURE - MUSE: NORMAL MMHG
T AXIS - MUSE: 50 DEGREES
VENTRICULAR RATE- MUSE: 72 BPM

## 2024-10-30 PROCEDURE — 88185 FLOWCYTOMETRY/TC ADD-ON: CPT | Performed by: SURGERY

## 2024-10-30 PROCEDURE — C1889 IMPLANT/INSERT DEVICE, NOC: HCPCS

## 2024-10-30 PROCEDURE — 36415 COLL VENOUS BLD VENIPUNCTURE: CPT | Performed by: NURSE PRACTITIONER

## 2024-10-30 PROCEDURE — 88188 FLOWCYTOMETRY/READ 9-15: CPT | Performed by: STUDENT IN AN ORGANIZED HEALTH CARE EDUCATION/TRAINING PROGRAM

## 2024-10-30 PROCEDURE — 32408 CORE NDL BX LNG/MED PERQ: CPT

## 2024-10-30 PROCEDURE — 250N000009 HC RX 250: Performed by: STUDENT IN AN ORGANIZED HEALTH CARE EDUCATION/TRAINING PROGRAM

## 2024-10-30 PROCEDURE — 88341 IMHCHEM/IMCYTCHM EA ADD ANTB: CPT | Mod: TC | Performed by: SURGERY

## 2024-10-30 PROCEDURE — 88342 IMHCHEM/IMCYTCHM 1ST ANTB: CPT | Mod: 26 | Performed by: PATHOLOGY

## 2024-10-30 PROCEDURE — 999N000142 HC STATISTIC PROCEDURE PREP ONLY

## 2024-10-30 PROCEDURE — 250N000011 HC RX IP 250 OP 636: Performed by: STUDENT IN AN ORGANIZED HEALTH CARE EDUCATION/TRAINING PROGRAM

## 2024-10-30 PROCEDURE — 88305 TISSUE EXAM BY PATHOLOGIST: CPT | Mod: 26 | Performed by: PATHOLOGY

## 2024-10-30 PROCEDURE — 71045 X-RAY EXAM CHEST 1 VIEW: CPT | Mod: 26 | Performed by: RADIOLOGY

## 2024-10-30 PROCEDURE — 99152 MOD SED SAME PHYS/QHP 5/>YRS: CPT

## 2024-10-30 PROCEDURE — 999N000285 HC STATISTIC VASC ACCESS LAB DRAW WITH PIV START

## 2024-10-30 PROCEDURE — 88360 TUMOR IMMUNOHISTOCHEM/MANUAL: CPT | Mod: 26 | Performed by: PATHOLOGY

## 2024-10-30 PROCEDURE — 32408 CORE NDL BX LNG/MED PERQ: CPT | Mod: RT | Performed by: RADIOLOGY

## 2024-10-30 PROCEDURE — 999N000132 HC STATISTIC PP CARE STAGE 1

## 2024-10-30 PROCEDURE — 85610 PROTHROMBIN TIME: CPT | Performed by: NURSE PRACTITIONER

## 2024-10-30 PROCEDURE — 272N000505 HC NEEDLE CR5

## 2024-10-30 PROCEDURE — 88341 IMHCHEM/IMCYTCHM EA ADD ANTB: CPT | Mod: 26 | Performed by: PATHOLOGY

## 2024-10-30 PROCEDURE — 999N000065 XR CHEST 1 VIEW

## 2024-10-30 PROCEDURE — 93005 ELECTROCARDIOGRAM TRACING: CPT

## 2024-10-30 PROCEDURE — 71045 X-RAY EXAM CHEST 1 VIEW: CPT

## 2024-10-30 RX ORDER — LIDOCAINE 40 MG/G
CREAM TOPICAL
Status: DISCONTINUED | OUTPATIENT
Start: 2024-10-30 | End: 2024-10-30 | Stop reason: HOSPADM

## 2024-10-30 RX ORDER — NALOXONE HYDROCHLORIDE 0.4 MG/ML
0.2 INJECTION, SOLUTION INTRAMUSCULAR; INTRAVENOUS; SUBCUTANEOUS
Status: DISCONTINUED | OUTPATIENT
Start: 2024-10-30 | End: 2024-10-30 | Stop reason: HOSPADM

## 2024-10-30 RX ORDER — NALOXONE HYDROCHLORIDE 0.4 MG/ML
0.4 INJECTION, SOLUTION INTRAMUSCULAR; INTRAVENOUS; SUBCUTANEOUS
Status: DISCONTINUED | OUTPATIENT
Start: 2024-10-30 | End: 2024-10-30 | Stop reason: HOSPADM

## 2024-10-30 RX ORDER — FENTANYL CITRATE 50 UG/ML
25-50 INJECTION, SOLUTION INTRAMUSCULAR; INTRAVENOUS EVERY 5 MIN PRN
Status: DISCONTINUED | OUTPATIENT
Start: 2024-10-30 | End: 2024-10-30 | Stop reason: HOSPADM

## 2024-10-30 RX ORDER — FLUMAZENIL 0.1 MG/ML
0.2 INJECTION, SOLUTION INTRAVENOUS
Status: DISCONTINUED | OUTPATIENT
Start: 2024-10-30 | End: 2024-10-30 | Stop reason: HOSPADM

## 2024-10-30 RX ADMIN — MIDAZOLAM 1 MG: 1 INJECTION INTRAMUSCULAR; INTRAVENOUS at 08:52

## 2024-10-30 RX ADMIN — LIDOCAINE HYDROCHLORIDE 7 ML: 10 INJECTION, SOLUTION EPIDURAL; INFILTRATION; INTRACAUDAL; PERINEURAL at 08:52

## 2024-10-30 RX ADMIN — FENTANYL CITRATE 50 MCG: 50 INJECTION, SOLUTION INTRAMUSCULAR; INTRAVENOUS at 08:52

## 2024-10-30 ASSESSMENT — ACTIVITIES OF DAILY LIVING (ADL)
ADLS_ACUITY_SCORE: 0

## 2024-10-30 NOTE — PROGRESS NOTES
Pt s/p right lung biopsy. Vitally stable. Hypertensive, not much divergent from baseline. Denies pain. Right upper chest puncture site covered with tegaderm and CDI. Bedrest for 1 hour and 2nd xray scheduled at 10am.  Small pneumo on first xray.

## 2024-10-30 NOTE — PROGRESS NOTES
Patient Name: Walt Estrada  Medical Record Number: [id number]  Today's Date: 10/30/2024    Procedure: Image guided right lung lesion biopsy, possible chest tube placement.  Proceduralist: MD Ted., MD Eloy  Pathology present: no, 5 samples taken and sent to lab.    Procedure Start: 0837  Procedure end: 0900  Sedation medications administered: Fentanyl: 50 mcg Versed:1mg     Report given to: 2A,RN  : n/a    Other Notes: Pt arrived to IR room CT2 from . Consent reviewed. Pt denies any questions or concerns regarding procedure. Pt positioned supine and monitored per protocol. Chest xray post and again in one hour.  Pt tolerated procedure without any noted complications. Pt transferred back to .    Ana Maria Castro, RN

## 2024-10-30 NOTE — DISCHARGE INSTRUCTIONS
[facility]    Interventional Radiology  Patient Instructions Following Lung Biopsy    AFTER YOU GO HOME  If you were given sedation, for the first 24 hours: we recommend that an adult stay with you, DO NOT drive or operate machinery at home or at work, DO NOT smoke, DO NOT make any important or legal decisions.  DO NOT drink alcoholic beverages the day of your procedure  Drink plenty of fluids   Resume your regular diet, unless otherwise instructed by your Primary Physician  Relax and take it easy for 48 hours  DO NOT do any strenuous exercise or lifting (> 10 lbs) for at least 7 days following your procedure  Keep the dressing dry and in place for 24 hours. Replace with Band aid for 2 days.  Never leave a wet dressing in place.  Do not take a shower for at least 12 hours following your procedure. No tub bath, hot tub, or swimming for 5 days.  There should be minimum drainage from the biopsy site    CALL THE PHYSICIAN IF:  You start bleeding from the procedure site.  If you do start to bleed from that site, lie down flat and hold pressure on the site for a minimum of 10 minutes.  Your physician will tell you if you need to return to the hospital  You develop nausea or vomiting  You have excessive swelling, redness, or tenderness at the site  You have drainage that looks like it is infected.  You experience severe pain  You develop hives or a rash or unexplained itching  You develop shortness of breath  You develop a temperature of 101 degrees F or greater  You develop bloody clots or red urine after you are discharged  You develop chest pain or cough up blood, lightheadedness or fainting    [facility] INTERVENTIONAL RADIOLOGY DEPARTMENT  Procedure Physician:    Valentina Jean MD., MD Eloy        Date of procedure: October 30, 2024  Telephone Numbers: [phone]      Monday-Friday 7:30 am to 4:00 pm  [phone] After 4:00 pm Monday-Friday, Weekends & Holidays.   Ask for the Interventional  Radiologist on call.  Someone is on call 24 hrs/day  [facility] toll free number: [phone] Monday-Friday 8:00 am to 4:30 pm  [facility] Emergency Dept: [phone]

## 2024-10-30 NOTE — PROGRESS NOTES
Pt on 2a prep for lung biopsy. Vitally stable. Reports sternal pain. PIV placed, INR collected. Last took aspirin one week ago. Consent obtained. Wife and son bedside, son will provide transportation post procedure. Awaiting INR result.

## 2024-10-30 NOTE — PROGRESS NOTES
Pt discharge criteria met. Vitally stable. Puncture site without bleeding. 2nd xray read by IR and cleared to leave. Discharge instructions reviewed with pt and family. Pt left unit accompanied by wife.

## 2024-10-30 NOTE — PRE-PROCEDURE
GENERAL PRE-PROCEDURE:   Procedure:  Right lung lesion biopsy with possible chest tube placement    Written consent obtained?: Yes    Risks and benefits: Risks, benefits and alternatives were discussed    Consent given by:  Patient  Patient states understanding of procedure being performed: Yes    Patient's understanding of procedure matches consent: Yes    Procedure consent matches procedure scheduled: Yes    Expected level of sedation:  Moderate  Appropriately NPO:  Yes  ASA Class:  2  Mallampati  :  Grade 1- soft palate, uvula, tonsillar pillars, and posterior pharyngeal wall visible  Lungs:  Lungs clear with good breath sounds bilaterally  Heart:  Normal heart sounds and rate  History & Physical reviewed:  History and physical reviewed and no updates needed  Statement of review:  I have reviewed the lab findings, diagnostic data, medications, and the plan for sedation

## 2024-10-30 NOTE — PROCEDURES
[facility]    Procedure: IR Procedure Note    Date/Time: 10/30/2024 9:01 AM    Performed by: Valentina Jean DO  Authorized by: Woo Lyons MD  IR Fellow Physician:    Pre Procedure Diagnosis: right lung lesion  Post Procedure Diagnosis: same    UNIVERSAL PROTOCOL   Site Marked: NA  Prior Images Obtained and Reviewed:  Yes  Required items: Required blood products, implants, devices and special equipment available    Patient identity confirmed:  Arm band, provided demographic data, hospital-assigned identification number and verbally with patient  Patient was reevaluated immediately before administering moderate or deep sedation or anesthesia  Confirmation Checklist:  Correct equipment/implants were available, procedure was appropriate and matched the consent or emergent situation, relevant allergies and patient's identity using two indicators  Time out: Immediately prior to the procedure a time out was called    Universal Protocol: the Joint Commission Universal Protocol was followed    Preparation: Patient was prepped and draped in usual sterile fashion    ESBL (mL):  0     ANESTHESIA    Anesthesia:  Local infiltration  Local Anesthetic:  Lidocaine 1% without epinephrine  Anesthetic Total (mL):  8      SEDATION  Patient Sedated: Yes    Sedation Type:  Moderate (conscious) sedation  Sedation:  Fentanyl and midazolam  Vital signs: Vital signs monitored during sedation    See dictated procedure note for full details.  Findings: Right lung biopsy performed. 5 cores obtained.   Trace pneumothorax on post scan.     Specimens: fluid and/or tissue for laboratory analysis    Procedural Complications: None    Condition: Stable    Plan: -CXR now  -CXR in 1 hour  -bedrest 1 hour      PROCEDURE    Patient Tolerance:  Patient tolerated the procedure well with no immediate complications  Length of time physician/provider present for 1:1 monitoring during sedation:  0-22 min       Monitor hemoglobin A1c.  Discussed diabetic diet and exercise protocol.  Continue medications.  Monitor for side effects.  Discussed checking blood glucose.  Discussed symptoms to monitor for and to notify me immediately if persistent or worsening.  Follow up with Ophthalmology/Optometry and Podiatry.

## 2024-10-31 LAB
INTERPRETATION: NORMAL
LAB DIRECTOR COMMENTS: NORMAL
LAB DIRECTOR DISCLAIMER: NORMAL
LAB DIRECTOR INTERPRETATION: NORMAL
LAB DIRECTOR METHODOLOGY: NORMAL
LAB DIRECTOR RESULTS: NORMAL
SIGNIFICANT RESULTS: NORMAL
SPECIMEN DESCRIPTION: NORMAL
SPECIMEN DESCRIPTION: NORMAL
TEST DETAILS, MDL: NORMAL

## 2024-10-31 PROCEDURE — G0452 MOLECULAR PATHOLOGY INTERPR: HCPCS | Mod: 26 | Performed by: PATHOLOGY

## 2024-10-31 PROCEDURE — 81455 SO/HL 51/>GSAP DNA/DNA&RNA: CPT | Performed by: SURGERY

## 2024-11-01 ENCOUNTER — PATIENT OUTREACH (OUTPATIENT)
Dept: ONCOLOGY | Facility: CLINIC | Age: 64
End: 2024-11-01

## 2024-11-01 ENCOUNTER — VIRTUAL VISIT (OUTPATIENT)
Dept: SURGERY | Facility: CLINIC | Age: 64
End: 2024-11-01
Attending: SURGERY
Payer: MEDICARE

## 2024-11-01 VITALS — WEIGHT: 230 LBS | BODY MASS INDEX: 32.2 KG/M2 | HEIGHT: 71 IN

## 2024-11-01 DIAGNOSIS — C34.91 NON-SMALL CELL CARCINOMA OF LUNG, RIGHT (H): Primary | ICD-10-CM

## 2024-11-01 LAB
PATH REPORT.ADDENDUM SPEC: ABNORMAL
PATH REPORT.COMMENTS IMP SPEC: ABNORMAL
PATH REPORT.COMMENTS IMP SPEC: YES
PATH REPORT.FINAL DX SPEC: ABNORMAL
PATH REPORT.GROSS SPEC: ABNORMAL
PATH REPORT.MICROSCOPIC SPEC OTHER STN: ABNORMAL
PATH REPORT.MICROSCOPIC SPEC OTHER STN: ABNORMAL
PATH REPORT.RELEVANT HX SPEC: ABNORMAL
PHOTO IMAGE: ABNORMAL

## 2024-11-01 PROCEDURE — 99442 PR PHYSICIAN TELEPHONE EVALUATION 11-20 MIN: CPT | Mod: 93 | Performed by: SURGERY

## 2024-11-01 ASSESSMENT — PAIN SCALES - GENERAL: PAINLEVEL_OUTOF10: NO PAIN (0)

## 2024-11-01 NOTE — LETTER
11/1/2024      Walt Estrada  [address] [place] [address]      Dear Colleague,    Thank you for referring your patient, Walt Estrada, to the [facility]. Please see a copy of my visit note below.    Virtual Visit Details    Type of service:  Telephone Visit   Phone call duration: 15 minutes   Originating Location (pt. Location): Home    Distant Location (provider location):  On-site        [facility]  [address] [address]  Phone: [phone]  Fax: [phone]  Department of Surgery  Division of Surgical Oncology    Follow-Up    Patient:  Walt Estrada, Date of birth 1960  Date of Visit:  11/01/2024  Referring Provider Referred Self      Assessment & Plan     Walt Estrada is a 64 year old male being followed for multiple imaging abnormalities, with a new biopsy-proven lung cancer.    #Liver - all imaging findings are compatible with benign vascular findings (hemangioma, portal venous shunting etc). No follow-up needed    #Lung - will refer patient to Medical Oncology. No further recommendations.     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 We had a long discussion about this today. This is in a very hard area to access, and Dr. Loya - an internationally recognized expert in EUS - has now tried twice to biopsy this, with no meaningful results (benign findings). There is theoretically a (low) chance this could be cancer (primary pancreas, or a lung metastasis now that we have a new diagnosis of lung cancer), although it is unclear at this time and looks very much like a neuroendocrine tumor, the size of which is not likely to need anything other than surveillance right now. We discussed this at our GI multidisciplinary tumor board and agreed that a 3rd attempt at a biopsy was unlikely to yield meaningful results compared to morbidity risk, and that surveillance would be appropriate. However, I  do think that the Thoracic Oncology team should carefully evaluate this case and consider to what extent they are concerned this might represent a metastatic deposit from a newly-diagnosed lung cancer and whether that might change their approach to initial treatment plans or pushing to consider a 3rd biopsy. A Thoracic Oncologist may wish to stage his cancer with a PET, the results of which may dictate whether that get followed by a DOTATATE PET for further evaluation of the pancreas (although this may be denied by insurance in the absence of a biopsy).    I will send a Medical Oncology referral today and ask that provider to close the loop with me regarding their level of concern. For now, I am planning to check in on Walt's case in about 6mo, at which time I would order a CT scan (pancreas protocol), although I assume his future Oncologist will be the one ordering those scans and I will gladly review them.    All questions were answered to their apparent satisfaction, and contact information was provided should additional questions or concerns arise.        Srinivas Interiano MD MPHS      Medical Decision Making   Total time spent was 30 minutes, including but not limited to patient-facing time, chart review, review of tests/studies as noted above, and care coordination.             History of Present Illness    Walt Estrada is a 64 year old male who is being followed for imaging consistent with pNET but biopsies negative x2.    He is joined today by multiple family members.    UPDATES: Walt got a lung biopsy the other day, with path showing non-squamous cell lung cancer. No other changes to his health.    Results Reviewed:  Pathology report, see Epic    I, Srinivas Interiano, personally reviewed the above studies.            Again, thank you for allowing me to participate in the care of your patient.        Sincerely,        Srinivas Interiano MD

## 2024-11-01 NOTE — NURSING NOTE
Current patient location: [id number] [place] [id number]    Is the patient currently in the state of [place]? YES    Visit mode:TELEPHONE    If the visit is dropped, the patient can be reconnected by: TELEPHONE VISIT: Phone number:   Telephone Information:   Mobile [phone]   Mobile Not on file.       Will anyone else be joining the visit? NO  (If patient encounters technical issues they should call [phone]    Are changes needed to the allergy or medication list? No    Are refills needed on medications prescribed by this physician? NO    Rooming Documentation:  Questionnaire complete    Reason for visit: JESS FOX

## 2024-11-01 NOTE — PROGRESS NOTES
New Patient Oncology Nurse Navigator Note     Referring provider: Dr. Srinivas Interiano    Referring Clinic/Organization: [facility]  Referred to: Medical Oncology  Requested provider (if applicable): First available - did not specify   Referral Received: 11/01/24       Evaluation for :   Diagnosis   C34.91 (ICD-10-CM) - Non-small cell carcinoma of lung, right (H)     My Clinical Question Is: NON-SMALL CELL LUNG CARCINOMA CONSISTENT WITH ADENOCARCINOMA, predominant acinar growth pattern     Clinical History (per Nurse review of records provided):      10/18/2024 CT Chest w/ IV contrast (scanned media) discusses spiculated mass in the RUL.    10/30/2024 Surgical Pathology (bookmarked) showed:  Final Diagnosis   LUNG, RIGHT, BIOPSY:  -NON-SMALL CELL LUNG CARCINOMA CONSISTENT WITH ADENOCARCINOMA, predominant acinar growth pattern, developing within a pulmonary scar, see comment.   Electronically signed by Sergio Nunez MD on 10/31/2024 at  5:06 PM   Comment  [id number]   Molecular studies (lung NGS panel) are in progress and results will be provided separately.   Clinical Information  UULAB   lung mass     Clinical Assessment / Barriers to Care (Per Nurse):    Records Location: [facility]   Records Needed: None  Additional testing needed prior to consult: PET, brain MRI  Referral updates and Plan:     11/1: Writer called Walt to discuss the referral. He reports Dr. Interiano is recommending he meet with Dr. Lux as he works in both [place] and [place] locations. Writer discussed the need for PET with him and will reach out to Dr. Lux to see if he would like to place PET scan order. Currently holding 11/14 for Walt (video). Writer gave Walt my direct number and NPS phone number if he has any further questions in the meantime. Will keep Walt updated.    Msg sent to Dr. Lux if he would like to order any scans prior.     11/4: Walt called asking for an update on this. Writer explained I have sent another  message to Apryl, Dr. Lux's nurse today. Will keep him updated. He reported he would like to move forward in scheduling the consultation with Dr. Lux. His call was transferred to NPS to schedule.     11/6: Per inbasket from Apryl, RN orders have been placed for stat PET and brain MRI. Left a voicemail to return call. Walt called writer back. Writer explained the two scans that are ordered. Walt reports he is claustrophobic and previously had been prescribed pre-med for having an MRI. Writer sent a message to Dr. Lux's RN, Apryl to see if Dr. Lux would be willing to prescribe something. Preferred pharmacy is the [facility] in [place].    11/7: Per chart review, Dr. Lux has prescribed the anxiolytic for Walt. Writer called Walt and instructed him to not take the medical prior to the MRI appt. He needs to bring it with him and arrive early and will need a  afterwards. Walt is schedule for brain MRI 11/8 and PET 11/12. He will see Dr. Lux 11/14. Walt reports he is doing emotionally well today. He is on his way to see his only grandchild who is turning 1 soon. He is aware he can call me with any questions prior to meeting with Dr. Lux.    MI Kaufman, RN, [facility] Oncology Nurse Navigator  [phone] / [phone]

## 2024-11-01 NOTE — PROGRESS NOTES
Virtual Visit Details    Type of service:  Telephone Visit   Phone call duration: 15 minutes   Originating Location (pt. Location): Home    Distant Location (provider location):  On-site        [facility]  [address] [address]  Phone: [phone]  Fax: [phone]  Department of Surgery  Division of Surgical Oncology    Follow-Up    Patient:  Walt Estrada, Date of birth 1960  Date of Visit:  11/01/2024  Referring Provider Referred Self      Assessment & Plan      Walt Estrada is a 64 year old male being followed for multiple imaging abnormalities, with a new biopsy-proven lung cancer.    #Liver - all imaging findings are compatible with benign vascular findings (hemangioma, portal venous shunting etc). No follow-up needed    #Lung - will refer patient to Medical Oncology. No further recommendations.     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 We had a long discussion about this today. This is in a very hard area to access, and Dr. Loya - an internationally recognized expert in EUS - has now tried twice to biopsy this, with no meaningful results (benign findings). There is theoretically a (low) chance this could be cancer (primary pancreas, or a lung metastasis now that we have a new diagnosis of lung cancer), although it is unclear at this time and looks very much like a neuroendocrine tumor, the size of which is not likely to need anything other than surveillance right now. We discussed this at our GI multidisciplinary tumor board and agreed that a 3rd attempt at a biopsy was unlikely to yield meaningful results compared to morbidity risk, and that surveillance would be appropriate. However, I do think that the Thoracic Oncology team should carefully evaluate this case and consider to what extent they are concerned this might represent a metastatic deposit from a newly-diagnosed lung cancer and whether that might change  their approach to initial treatment plans or pushing to consider a 3rd biopsy. A Thoracic Oncologist may wish to stage his cancer with a PET, the results of which may dictate whether that get followed by a DOTATATE PET for further evaluation of the pancreas (although this may be denied by insurance in the absence of a biopsy).    I will send a Medical Oncology referral today and ask that provider to close the loop with me regarding their level of concern. For now, I am planning to check in on Walt's case in about 6mo, at which time I would order a CT scan (pancreas protocol), although I assume his future Oncologist will be the one ordering those scans and I will gladly review them.    All questions were answered to their apparent satisfaction, and contact information was provided should additional questions or concerns arise.        Srinivas Interiano MD MPHS      Medical Decision Making    Total time spent was 30 minutes, including but not limited to patient-facing time, chart review, review of tests/studies as noted above, and care coordination.             History of Present Illness     Walt Estrada is a 64 year old male who is being followed for imaging consistent with pNET but biopsies negative x2.    He is joined today by multiple family members.    UPDATES: Walt got a lung biopsy the other day, with path showing non-squamous cell lung cancer. No other changes to his health.    Results Reviewed:  Pathology report, see Epic    I, Srinivas Interiano, personally reviewed the above studies.

## 2024-11-06 ENCOUNTER — PATIENT OUTREACH (OUTPATIENT)
Dept: ONCOLOGY | Facility: CLINIC | Age: 64
End: 2024-11-06
Payer: MEDICARE

## 2024-11-06 DIAGNOSIS — K76.9 LIVER LESION: ICD-10-CM

## 2024-11-06 DIAGNOSIS — C34.2 NON-SMALL CELL CANCER OF MIDDLE LOBE OF RIGHT LUNG (H): ICD-10-CM

## 2024-11-06 DIAGNOSIS — C34.91 NON-SMALL CELL CARCINOMA OF LUNG, RIGHT (H): Primary | ICD-10-CM

## 2024-11-06 DIAGNOSIS — K86.9 LESION OF PANCREAS: ICD-10-CM

## 2024-11-06 NOTE — PROGRESS NOTES
Walt called writer and reported he is having difficulty with dealing with anxiety and has relapsed using alcohol and has a history of alcoholism. He reports he had PTSD, [facility]. He reports he has anxiety in the last 3 months from going through this cancer work up. He is unable to calm himself down so his go-to method is alcohol and he reports disappointment letting his family down when he does drink.    Walt requested to talk to someone virtually, and maybe get back on the pills that make him sick when he drinks.    He reports he has a PCP in [place] and the last time he saw them was in April 2024, however he does not have a PCP locally.     Writer explained I can have a  reach out to him to discuss resources that might be available. And once he establishes care with Dr. Lux, Dr. Lux could discuss an oncology psychology referral with him. Walt verbalized understanding. He reiterated that he did like talking to someone virtually so that he didn't have to leave the house for another appointment. Writer encouraged Walt to reach out to me if he has any further questions or concerns. He should be hearing directly from the  in a few days. Walt thanked writer for the referral and will keep me updated.     ELADIO KaufmanN, RN, OCN  [facility] Oncology Nurse Navigator  [phone] / [phone]

## 2024-11-07 ENCOUNTER — PATIENT OUTREACH (OUTPATIENT)
Dept: CARE COORDINATION | Facility: CLINIC | Age: 64
End: 2024-11-07
Payer: MEDICARE

## 2024-11-07 DIAGNOSIS — F40.240 CLAUSTROPHOBIA: Primary | ICD-10-CM

## 2024-11-07 RX ORDER — LORAZEPAM 0.5 MG/1
0.5 TABLET ORAL SEE ADMIN INSTRUCTIONS
Qty: 2 TABLET | Refills: 0 | Status: SHIPPED | OUTPATIENT
Start: 2024-11-07

## 2024-11-07 NOTE — PROGRESS NOTES
"Social Work - Intervention  [facility]    Data/Intervention:  Patient Name: Walt Estrada Goes By: Walt    /Age: 1960 (64 year old)     Visit Type: telephone  Referral Source: Katy Bailon RN  Reason for Referral:     Reason for Referral: Mental Health/Chemical Dependency Resources    Patient/Caregiver Support   Patient/Caregiver Support: Support for Coping with Illness or New Diagnosis   Scheduling Instructions: A  will reach out to you within three business days. If you don't hear from anyone, please call your clinic.   Specify Mental Health/Chemical Dependency Resources: Walt has a history of alcoholism and was previously seeing a counselor online but now with his new diagnosis he is starting to relapse   Additional Information: He states he does not have a local PCP. He is due to establish care with [facility].     Psychosocial Information/Concerns:  Walt reports that he \"used to drink because I didn't like myself,\" but reports he was sober for 14 months due to assistance [facility] supporting his [facility]. Walt endorses that he now is struggling with significant anxiety in the context of work-up and fears about end-of-life, and started drinking again (past 3 months).     Walt acknowledges that he takes sertraline daily, but finds that he has occasional \"desire to calm down\" which leads him to drink, \"not daily, but every few days.\" Walt reports when he does drink, he prefers vodka, and takes 4-5 shots. Walt reported he has not had anything to drink today, and last attempted to drink on Tuesday but family prevented him from acting on this urge. Walt reports his last drink of alcohol was 2 weeks ago. Walt denies need for chemical dependency resources at present time, and feels that he wants to focus on efforts for anxiety support.     Walt appears to have good awareness of bodily sensations associated with anxiety \"my stomach turns over, I start shaking\" and he " tries to engage with strategies to help him cope: crosswords, jigsaw puzzles, or computer games. Walt expresses concern in noticing that he has started to use alcohol as a tool for coping when these strategies do not work.     Walt receptive to a connection with oncology psychologist, as he believes anxiety is centered in the cancer experience, and less related to his history of PTSD. Walt receptive of SW sending additional resource information for him to review via [employer].       Assessment/Plan:  1) Walt will outreach to schedule with Wood Zuluaga  2) Onc SW will follow-up after visit with medical oncologist for psychosocial check-in and support.      Provided patient/family with contact information and availability.    Halina Jones, MIKKI, LICSW, OSW-C  Clinical - Adult Oncology  Phone: [phone]  She/Her/Hers  [facility]: Martha BHANDARI  8am-4:30pm  [facility]: DESI Lott F 8am-4:30pm   Support Groups at [facility]: Social Work Services for Cancer Patients ([email or web address])

## 2024-11-08 ENCOUNTER — HOSPITAL ENCOUNTER (OUTPATIENT)
Dept: MRI IMAGING | Facility: HOSPITAL | Age: 64
Discharge: HOME | End: 2024-11-08
Attending: INTERNAL MEDICINE | Admitting: INTERNAL MEDICINE
Payer: MEDICARE

## 2024-11-08 DIAGNOSIS — C34.91 NON-SMALL CELL CARCINOMA OF LUNG, RIGHT (H): ICD-10-CM

## 2024-11-08 DIAGNOSIS — K86.9 LESION OF PANCREAS: ICD-10-CM

## 2024-11-08 DIAGNOSIS — K76.9 LIVER LESION: ICD-10-CM

## 2024-11-08 PROCEDURE — A9585 GADOBUTROL INJECTION: HCPCS | Performed by: INTERNAL MEDICINE

## 2024-11-08 PROCEDURE — 255N000002 HC RX 255 OP 636: Performed by: INTERNAL MEDICINE

## 2024-11-08 PROCEDURE — 70553 MRI BRAIN STEM W/O & W/DYE: CPT | Mod: MA

## 2024-11-08 RX ORDER — GADOBUTROL 604.72 MG/ML
0.1 INJECTION INTRAVENOUS ONCE
Status: COMPLETED | OUTPATIENT
Start: 2024-11-08 | End: 2024-11-08

## 2024-11-08 RX ADMIN — GADOBUTROL 10 ML: 604.72 INJECTION INTRAVENOUS at 11:36

## 2024-11-12 ENCOUNTER — HOSPITAL ENCOUNTER (OUTPATIENT)
Dept: PET IMAGING | Facility: HOSPITAL | Age: 64
Discharge: HOME | End: 2024-11-12
Attending: INTERNAL MEDICINE | Admitting: INTERNAL MEDICINE
Payer: MEDICARE

## 2024-11-12 DIAGNOSIS — K76.9 LIVER LESION: ICD-10-CM

## 2024-11-12 DIAGNOSIS — K86.9 LESION OF PANCREAS: ICD-10-CM

## 2024-11-12 DIAGNOSIS — C34.2 NON-SMALL CELL CANCER OF MIDDLE LOBE OF RIGHT LUNG (H): ICD-10-CM

## 2024-11-12 DIAGNOSIS — C34.91 NON-SMALL CELL CARCINOMA OF LUNG, RIGHT (H): ICD-10-CM

## 2024-11-12 LAB — GLUCOSE BLDC GLUCOMTR-MCNC: 87 MG/DL (ref 70–99)

## 2024-11-12 PROCEDURE — 343N000001 HC RX 343 MED OP 636: Performed by: INTERNAL MEDICINE

## 2024-11-12 PROCEDURE — 82962 GLUCOSE BLOOD TEST: CPT

## 2024-11-12 PROCEDURE — 78816 PET IMAGE W/CT FULL BODY: CPT | Mod: MA,PI

## 2024-11-12 PROCEDURE — A9552 F18 FDG: HCPCS | Performed by: INTERNAL MEDICINE

## 2024-11-12 RX ORDER — FLUDEOXYGLUCOSE F 18 200 MCI/ML
7-18 INJECTION, SOLUTION INTRAVENOUS ONCE
Status: COMPLETED | OUTPATIENT
Start: 2024-11-12 | End: 2024-11-12

## 2024-11-12 RX ADMIN — FLUDEOXYGLUCOSE F 18 13.71 MILLICURIE: 200 INJECTION, SOLUTION INTRAVENOUS at 08:40

## 2024-11-12 NOTE — TELEPHONE ENCOUNTER
RECORDS STATUS - ALL OTHER DIAGNOSIS      RECORDS RECEIVED FROM: [facility] - Internal records   DATE RECEIVED: 11/12

## 2024-11-14 ENCOUNTER — PRE VISIT (OUTPATIENT)
Dept: ONCOLOGY | Facility: CLINIC | Age: 64
End: 2024-11-14
Payer: MEDICARE

## 2024-11-14 ENCOUNTER — VIRTUAL VISIT (OUTPATIENT)
Dept: ONCOLOGY | Facility: CLINIC | Age: 64
End: 2024-11-14
Attending: SURGERY
Payer: MEDICARE

## 2024-11-14 VITALS — WEIGHT: 220 LBS | HEIGHT: 71 IN | BODY MASS INDEX: 30.8 KG/M2

## 2024-11-14 DIAGNOSIS — C34.91 NON-SMALL CELL CARCINOMA OF LUNG, RIGHT (H): ICD-10-CM

## 2024-11-14 ASSESSMENT — PAIN SCALES - GENERAL: PAINLEVEL_OUTOF10: MILD PAIN (2)

## 2024-11-14 NOTE — NURSING NOTE
Is the patient currently in the state of [place]? YES    Visit mode:VIDEO    If the visit is dropped, the patient can be reconnected by:VIDEO VISIT: Send to e-mail at: [email or web address]    Will anyone else be joining the visit? NO  (If patient encounters technical issues they should call [phone]    Are changes needed to the allergy or medication list? No    Are refills needed on medications prescribed by this physician? NO    Rooming Documentation:  Questionnaire(s) completed    Reason for visit: Video Visit (New apt )    Roslyn FOX

## 2024-11-14 NOTE — PROGRESS NOTES
"Virtual Visit Details    Type of service:  Video Visit   Video Start Time: {video visit start/end time for provider to select:924687}  Video End Time:{video visit start/end time for provider to select:051846}    Originating Location (pt. Location): {video visit patient location:886696::\"Home\"}  {PROVIDER LOCATION On-site should be selected for visits conducted from your clinic location or adjoining [facility] hospital, academic office, or other nearby [facility] building. Off-site should be selected for all other provider locations, including home:510776}  Distant Location (provider location):  {virtual location provider:677480}  Platform used for Video Visit: {Virtual Visit Platforms:188901::\"[employer]\"} [facility] Consult Note    Patient: Walt Estrada  MRN: [id number]  Date of Service: 11/14/2024      Reason for Visit    No chief complaint on file.        Assessment/Plan    Problem List Items Addressed This Visit    None      ECOG Performance    0 - Independent    Problem List    There is no problem list on file for this patient.    ______________________________________________________________________________    Staging History    Cancer Staging   No matching staging information was found for the patient.        History of presenting illness:  Walt Estrada is a 64-year-old male with a new diagnosis of lung cancer and pancreatic lesion who is seen in the oncology clinic as a video visit to discuss further evaluation management of the same.    He presented to [facility] in August with abdominal pain symptoms which was consistent with acute appendicitis.  Had CT of the abdomen and pelvis at the time which showed signs of early mild acute appendicitis.  There is also an incidentally noted 0.9 cm enhancing lesion adjacent to the pancreatic neck which is suspicious for a neuroendocrine tumor.  He underwent a laparoscopic appendectomy and surgical path showed acute appendicitis without evidence of " any malignancy, gangrene perforation or abscess.  Further evaluation of the pancreatic lesion he underwent endoscopic ultrasound-guided biopsy twice with [facility] with nondiagnostic path.  Finally it was concluded that he probably has a low-grade neuroendocrine tumor which by size criteria did not need any particular intervention.  However he had a CT of the chest done at an outside hospital in mid October for evaluation of right lung opacities seen on chest x-ray which showed a spiculated density in the right upper lobe measuring 3.1 cm x 1.4 cm x 1.4 cm suspicious for neoplasm.  He underwent IR guided biopsy of this lung lesion on 10/30/2024 which has come back showing non-small cell carcinoma most consistent with adenocarcinoma with acinar growth pattern.  He was referred to medical oncology following this.  We obtained a PET scan and an MRI of the brain.  He is here to review the results.    He does have a history of tobacco use but he quit in 2004.  He does complain of sternal pain which has been going on since his appendicitis surgery.  Also has a remote history of trauma.      Past History    Past Medical History:   Diagnosis Date    BCC (basal cell carcinoma), face     Dyslipidemia     Essential hypertension     GERD (gastroesophageal reflux disease)     Family History   Problem Relation Age of Onset    Multiple myeloma Sister     Anesthesia Reaction No family hx of     Deep Vein Thrombosis (DVT) No family hx of       Past Surgical History:   Procedure Laterality Date    APPENDECTOMY      ENDOSCOPIC ULTRASOUND UPPER GASTROINTESTINAL TRACT (GI) N/A 09/12/2024    Procedure: ENDOSCOPIC ULTRASOUND, ESOPHAGOSCOPY / UPPER GASTROINTESTINAL TRACT (GI) with biopsies and fine needle aspiration;  Surgeon: Brendon Loya MD;  Location: [facility] OR    ENDOSCOPIC ULTRASOUND UPPER GASTROINTESTINAL TRACT (GI) N/A 10/03/2024    Procedure: ENDOSCOPIC ULTRASOUND, ESOPHAGOSCOPY / UPPER GASTROINTESTINAL TRACT  with fine needle aspiration;  Surgeon: Brendon Loya MD;  Location: [facility] OR    HERNIA REPAIR      ORTHOPEDIC SURGERY      Elbow surgery    TONSILLECTOMY      Social History     Socioeconomic History    Marital status:      Spouse name: Not on file    Number of children: Not on file    Years of education: Not on file    Highest education level: Not on file   Occupational History    Not on file   Tobacco Use    Smoking status: Former     Types: Cigarettes    Smokeless tobacco: Never    Tobacco comments:     Quit 2004   Substance and Sexual Activity    Alcohol use: Not Currently     Comment: quit 07/01/23 previously heavy use of alcohol for 10 years    Drug use: Never    Sexual activity: Not on file   Other Topics Concern    Not on file   Social History Narrative    Not on file     Social Drivers of Health     Financial Resource Strain: High Risk (1/1/2022)    Received from [facility]    Financial Resource Strain     Difficulty of Paying Living Expenses: Not on file     Difficulty of Paying Living Expenses: Not on file   Food Insecurity: Not on file   Transportation Needs: Not on file   Physical Activity: Not on file   Stress: Not on file   Social Connections: Unknown (1/1/2022)    Received from [facility]    Social Connections     Frequency of Communication with Friends and Family: Not on file   Interpersonal Safety: Unknown (10/30/2024)    Interpersonal Safety     Do you feel physically and emotionally safe where you currently live?: Patient unable to answer     Within the past 12 months, have you been hit, slapped, kicked or otherwise physically hurt by someone?: Patient unable to answer     Within the past 12 months, have you been humiliated or emotionally abused in other ways by your partner or ex-partner?: Patient unable to answer   Housing Stability: Not on file        Allergies    Allergies   Allergen Reactions    Penicillins  Unknown     Other reaction(s): Gastrointestinal       Review of Systems    Pertinent items are noted in HPI.      Physical Exam        11/1/2024    12:30 PM   Oncology Vitals   Height 180 cm   Weight 104.327 kg   BSA (m2) 2.29 m2   BP --   Pain Score 0 (None)       General: alert and cooperative  HEENT: Head: Normal, normocephalic, atraumatic.  Eye: Normal external eye, conjunctiva, lids cornea, AVTAR.  Chest: Clear to auscultation bilaterally  Cardiac: S1, S2 normal, regular rate and rhythm  Abdomen: abdomen is soft without significant tenderness, masses, organomegaly or guarding  Extremities: atraumatic, no peripheral edema  Skin:   CNS: Alert and oriented x3, neurologic exam grossly normal.  Lymphatics: No bilateral cervical, axillary, supraclavicular or inguinal adenopathy noted      Lab Results    Recent Results (from the past week)   Glucose by meter   Result Value Ref Range    GLUCOSE BY METER POCT 87 70 - 99 mg/dL       Imaging Results    PET Oncology Whole Body    Result Date: 11/12/2024  EXAM: PET ONCOLOGY WHOLE BODY LOCATION: [facility] DATE: 11/12/2024 INDICATION: Initial treatment planning and staging for malignant neoplasm of upper lobe, right bronchus or lung COMPARISON: Abdominal MRI dated 10/20/2024 and thoracic CT dated 10/30/2024 CONTRAST: None TECHNIQUE: Serum glucose level 87 mg/dL. One hour post intravenous administration of 13.7 mCi F-18 FDG, PET imaging was performed from the skull vertex to feet, utilizing attenuation correction with concurrent axial CT and PET/CT image fusion. Dose reduction techniques were used. PET/CT FINDINGS: Spiculated FDG avid nodule in the periphery of the great upper lobe measuring 2.3 x 1.4 x 1.1 cm (Max SUV 9.1) with FDG avid destructive lesion involving the lower sternum with associated osseous dehiscence and extension into the adjacent soft tissues (Max SUV 12.1) suspicious for right upper lobe primary lung neoplasm with isolated sternal  metastasis. Degenerative shoulder, hip, and knee synovitis. Mild esophagitis. Subtle FDG avid soft tissue nodule measuring up to 8 mm in the pancreatic head (Max SUV 3.5). CT FINDINGS: Mild senescent intercranial changes. Moderate paranasal sinus mucosal thickening. Heterogeneous thyroid gland with non-FDG avid low-density nodule in the right thyroid lobe measuring up to 1.3 cm suggesting benignity. Calcified right hilar lymph nodes. Moderate coronary artery calcium. Splenic granulomas. Left renal cysts. Infrarenal abdominal aortic ectasia. Sigmoid diverticulosis. Pelvic phleboliths. Multilevel degenerative changes of the spine. The lower extremities are unremarkable.     IMPRESSION: 1.  Findings suspicious for right upper lobe primary lung neoplasm with isolated sternal metastasis. The sternal lesion is amenable to CT-guided histologic sampling if desired. 2. Subtle FDG avid soft tissue nodule measuring up to 8 mm in the pancreatic head. This corresponds to the enhancing nodule seen on prior MRI dated 10/22/2024 and is suggestive of a pancreatic neuroendocrine neoplasm given its low metabolic activity and imaging appearance on MRI.    MR Brain w/o & w Contrast    Result Date: 11/8/2024  EXAM: MR BRAIN W/O and W CONTRAST LOCATION: [facility] DATE: 11/8/2024 INDICATION: New non small cell lung cancer; also, pancreatic mass COMPARISON: None. CONTRAST: Gadavist 10mL TECHNIQUE: Routine multiplanar multisequence head MRI without and with intravenous contrast. FINDINGS: INTRACRANIAL CONTENTS: Diffusion-weighted images demonstrate no areas restricted diffusion. No subacute or chronic intracranial hemorrhage. Normal cerebral parenchymal volume. No midline shift. The basilar cisterns are patent. No cerebellar tonsillar ectopia. Mild to moderate periventricular and scattered foci of deep white matter T2 prolongation involving both cerebral hemispheres. There is a dominant T2 hyperintense lesion in the  posterior right centrum semiovale. No abnormal enhancement. SELLA: No abnormality accounting for technique. OSSEOUS STRUCTURES/SOFT TISSUES: Normal marrow signal. The major intracranial vascular flow voids are maintained. ORBITS: No abnormality accounting for technique. SINUSES/MASTOIDS: Moderate retention cysts with associated mild mucosal thickening of the maxillary sinuses. Mild mucosal thickening of the ethmoid air cells. No middle ear or mastoid effusion.     IMPRESSION: 1.  No abnormal intracranial enhancement. 2.  Mild diffuse cerebral parenchymal volume loss. Presumed chronic hypertensive/microvascular ischemic white matter changes. 3.  Moderate retention cysts with associated mild mucosal thickening of the maxillary sinuses. Mild mucosal thickening of the ethmoid air cells.    CT Lung Mediastinum Biopsy    Result Date: 10/31/2024  PROCEDURE: CT-guided biopsy Procedural Personnel Attending physician(s): Woo Lyons Procedure performed by Dr. Jean under my supervision. I, Dr. Woo Lyons, was present for the entire procedure. Fellow physician(s): Valentina Jean Resident physician(s): None Advanced practice provider(s): None Procedure Date (mm/dd/yyyy): 10/30/2024 Pre-procedure diagnosis: Right lung lesion Post-procedure diagnosis: Same Indication: Histopathologic diagnosis Previous biopsy of same target (QCDR): No Additional clinical history: None Complications: Trace pneumothorax     IMPRESSION: CT-guided biopsy of right lung lesion. 5 core samples obtained. Plan: Specimen(s) sent for evaluation. CXR now CXR in 1 hour _______________________________________________________________ PROCEDURE SUMMARY: - Percutaneous CT-guided coaxial core needle biopsy - Additional procedure(s): None PROCEDURE DETAILS: Pre-procedure Reference imaging for biopsy target: CT chest 10/18/2024 Consent: Informed consent for the procedure including risks, benefits and alternatives was obtained and time-out was performed  prior to the procedure. Preparation: The site was prepared and draped using maximal sterile barrier technique including cutaneous antisepsis. Anesthesia/sedation Level of anesthesia/sedation: Moderate sedation (conscious sedation) Anesthesia/sedation administered by: Independent trained observer under attending supervision with continuous monitoring of the patient?s level of consciousness and physiologic status Total intra-service sedation time (minutes): 23 Imaging prior to biopsy The patient was positioned supine. Initial imaging was performed. Biopsy target: - Organ or target location: Lung - Laterality: Right - Maximal diameter (cm): 3.0 x 1.8 cm Other findings: Stable right thyroid nodule measuring 1.9 cm. Aortic arch desiccation. Coronary artery dislocations. Cardiomegaly. No pericardial effusion. Visualized portion of the upper abdomen is within normal limits. Biopsy Local anesthesia was administered. Under CT guidance, the biopsy needle was advanced to the target and biopsy was performed. Coaxial needle: 19 gauge Core needle biopsy device: Welcu Core needle size: 20 gauge Number of core specimens: 5 On-site assessment of biopsy adequacy: No  Additional sampling recommendations: None Preliminary assessment of sample adequacy: Not applicable Needle removal The biopsy needle was removed and a sterile dressing was applied. Tract embolization: Gelfoam pledgets Imaging following biopsy Immediate post-biopsy imaging was performed using noncontrast CT. Post-biopsy imaging findings: Trace right pneumothorax. Otherwise stable exam. Contrast Contrast agent: None Contrast volume (mL): 0 Radiation Dose CT dose length product (mGy-cm): 1276 Additional Details Additional description of procedure: None Registry event: V/3/g Device used: None Equipment details: None Unique Device Identifiers: Not available Specimens removed: Biopsy samples as detailed above Estimated blood loss (mL): Less than 10 Standardized report:  [email or web address] Attestation Signer name: CARLOS AMBROSE I attest that I supervised the procedure and was immediately available. I reviewed the stored images and agree with the report as written. I have personally reviewed the examination and initial interpretation and I agree with the findings. CARLOS AMBROSE   SYSTEM ID:  [id number]    XR Chest 1 View    Result Date: 10/30/2024  Exam: XR CHEST 1 VIEW, 10/30/2024 10:19 AM Comparison: Earlier today from 9:20 AM History: 1 hr post R lung bx Findings: Frontal view of the chest. Costophrenic angles clear. Bibasilar and perihilar prominent pulmonary vasculature with extension into the upper lung zones. Persistent right upper lobe focal airspace opacity consistent with previously biopsied nodule. Trachea is midline. Mediastinum is within normal limits. Cardiopulmonary silhouette is within normal limits. Curvilinear atherosclerotic calcifications projecting over the aortic knob. No acute airspace disease. There is no pneumothorax or pleural effusion. The upper abdomen is unremarkable.     Impression: No acute discernible complication from right lung biopsy. I have personally reviewed the examination and initial interpretation and I agree with the findings. JANE RYDER MD   SYSTEM ID:  [id number]    XR Chest 1 View    Result Date: 10/30/2024  Examination:  XR CHEST 1 VIEW Date:  10/30/2024 9:15 AM Clinical Information: post R lung bx Comparison: none Findings: AP view of the chest. Trachea is midline. Cardiomediastinal silhouette is within normal limits. Focal hazy right upper lobe opacity corresponds to the biopsied nodule on same-day CT. No pleural effusion or discernible pneumothorax. Imaged upper abdomen is within normal limits. No acute osseous abnormalities.     Impression: No discernible pneumothorax. JANE RYDER MD   SYSTEM ID:  [id number]    MR Abdomen MRCP w/o & w Contrast    Result Date: 10/23/2024  MRI ABDOMEN CLINICAL HISTORY:    Lesion of pancreas;  Liver lesion; Mass of upper lobe of right lung; Pancreas lesion with multiple liver lesions TECHNIQUE:  Images were acquired with and without intravenous contrast through the abdomen. The following MR images were acquired: TrueFISP, multiplanar T2 weighted, axial T1 in/out of phase, axial fat-saturated T1, diffusion-weighted. Multiplanar T1-weighted images with fat saturation were before contrast administration and at multiple time points following the administration of intravenous contrast. Contrast dose: 10.0mL Gadavist FINDINGS: Comparison study: CT 8/21/2024 Liver: Noncirrhotic morphology of the liver. Mild diffuse hepatic steatosis. Scattered small nonenhancing cysts. T2 intermediate hepatic lesions demonstrating progressive peripheral nodular enhancement characteristic of hepatic hemangioma in the hepatic segment 6 measuring 1.9 cm (series 16 image 28), hepatic segment 7 measuring 2.1 cm (series 16 image 12), and hepatic segment 2 measuring 1.4 cm (series 16 image 11).  3.1 cm ill-defined early enhancing observation in hepatic segment 5 measuring 3.1 cm (series 29 image 45) with associated early filling of the adjacent middle hepatic vein branch. Enhancement persists on portal venous and three-minute delayed images with heterogenous T2 hyperintensity and no restricted diffusion. This is compatible with a portal venous shunt 8 mm arterially enhancing lesion in hepatic segment 3 (series 29 image 39) which remains hyperenhancing on portal venous and delayed images. There is underlying increased T2 signal without restricted diffusion. Additional 6 mm T2 hyperintense lesion in hepatic segment 2 demonstrating late arterial enhancement and remaining slightly hyperenhancing on three-minute delayed images (series 16 image 12). These are most consistent with flash filling hemangiomas. Biliary system: The gallbladder is within normal limits.. No intrahepatic or extrahepatic biliary dilation. Spleen: Borderline enlarged  measuring 13 cm in length. Kidneys: No hydronephrosis. T2 hyperintense nonenhancing renal cortical and parapelvic cysts. Subcentimeter T1 hyperintense exophytic, right renal hemorrhagic/proteinaceous cyst. Adrenal glands: Within normal limits. Pancreas: Preservation of intrinsic T1 hyperintense parenchymal signal. No pancreatic ductal dilation. T1/T2 isointense lesion in the head of the pancreas measuring 8 mm which demonstrating late arterial enhancement and remaining isointense to blood pool on portal venous and three-minute delayed images (series 31 image 49). The lesion abuts and exerts some mass effect upon the main pancreatic duct without upstream ductal dilation. There is associated restricted diffusion. Bowel: The visualized small and large bowel is nondistended. Lymph nodes: No adenopathy. Blood vessels: The major abdominal arteries and portal vessels are patent. Lung bases: Grossly clear. Bones and soft tissues: No suspicious osseous lesion. Mesentery and abdominal wall: Within normal limits. Ascites: None.     IMPRESSION: 1. Enhancing lesion in the head of the pancreas measuring 8 mm, suspicious for neuroendocrine tumor until proven otherwise. 2. No highly suspicious hepatic lesion. 3. Scattered benign hepatic hemangiomas. 4. Portal venous shunt in hepatic segment 5 I have personally reviewed the examination and initial interpretation and I agree with the findings. ROBER KNOG,    SYSTEM ID:  [id number]     A total of *** minutes was spent today on this visit including face to face conversation with the patient, EMR review (labs, imaging studies, pathology reports and outside records), counseling and care co-ordination and documentation.    Signed by: Elizabeth Lux MD

## 2024-11-18 ENCOUNTER — VIRTUAL VISIT (OUTPATIENT)
Dept: ONCOLOGY | Facility: HOSPITAL | Age: 64
End: 2024-11-18
Attending: PSYCHOLOGIST
Payer: MEDICARE

## 2024-11-18 DIAGNOSIS — F10.10 ALCOHOL ABUSE: ICD-10-CM

## 2024-11-18 DIAGNOSIS — F43.21 ADJUSTMENT DISORDER WITH DEPRESSED MOOD: Primary | ICD-10-CM

## 2024-11-18 PROCEDURE — 90832 PSYTX W PT 30 MINUTES: CPT | Mod: 93 | Performed by: PSYCHOLOGIST
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
## 2024-11-18 NOTE — NURSING NOTE
Current patient location: [address] [place] [address] [place]  [facility] [id number]    Is the patient currently in the state of [place]? YES    Visit mode:TELEPHONE    If the visit is dropped, the patient can be reconnected by:TELEPHONE VISIT: Phone number:   Telephone Information:   Mobile [phone]   Mobile Not on file.       Will anyone else be joining the visit? NO  (If patient encounters technical issues they should call [phone]    Are changes needed to the allergy or medication list? Pt stated no changes to allergies and Pt stated no med changes    Are refills needed on medications prescribed by this physician? NO    Rooming Documentation:  Not applicable    Reason for visit: Consult    Rebekah FOX

## 2024-11-21 ENCOUNTER — PATIENT OUTREACH (OUTPATIENT)
Dept: CARE COORDINATION | Facility: CLINIC | Age: 64
End: 2024-11-21
Payer: MEDICARE

## 2024-11-21 ENCOUNTER — PATIENT OUTREACH (OUTPATIENT)
Dept: ONCOLOGY | Facility: HOSPITAL | Age: 64
End: 2024-11-21
Payer: MEDICARE

## 2024-11-21 NOTE — PROGRESS NOTES
Social Work - Follow-Up  [facility]    Data/Intervention:    Patient Name: Walt Estrada Goes By: Walt    /Age: 1960 (64 year old)    Reason for Follow-Up:  Resource coordination/emotional support    Intervention/Education/Resources Provided:  SW called Walt to introduce self and follow up on resources for his family. He shared that he had a good visit with Wood Zuluaga and his family plans to join him at next in person session. SW also gave information about caregiver support group. Walt is eager to get information from his biopsy and about next steps for his treatment. No other needs at time of call. He has SW contact information and will reach out as needs arise. Emotional support provided.    Assessment/Plan:    Previously provided patient/family with writer's contact information and availability.      MIKKI Aguilar, [facility]  Adult Oncology Clinics  [place] (M,W), [place] (T) & [place] (Th)  *I am off Friday  Office: [phone]

## 2024-11-25 PROCEDURE — G0452 MOLECULAR PATHOLOGY INTERPR: HCPCS | Mod: 26 | Performed by: PATHOLOGY

## 2024-11-25 PROCEDURE — 81455 SO/HL 51/>GSAP DNA/DNA&RNA: CPT | Performed by: INTERNAL MEDICINE

## 2024-11-29 PROBLEM — C34.91 NON-SMALL CELL CARCINOMA OF LUNG, RIGHT (H): Status: ACTIVE | Noted: 2024-11-29

## 2024-11-29 PROBLEM — C79.51 MALIGNANT NEOPLASM METASTATIC TO BONE (H): Status: ACTIVE | Noted: 2024-11-29

## 2024-11-29 PROBLEM — C34.11 MALIGNANT NEOPLASM OF UPPER LOBE OF RIGHT LUNG (H): Status: ACTIVE | Noted: 2024-11-29

## 2024-12-02 ENCOUNTER — ONCOLOGY VISIT (OUTPATIENT)
Dept: ONCOLOGY | Facility: HOSPITAL | Age: 64
End: 2024-12-02
Payer: MEDICARE

## 2024-12-02 DIAGNOSIS — F43.21 ADJUSTMENT DISORDER WITH DEPRESSED MOOD: Primary | ICD-10-CM

## 2024-12-02 PROCEDURE — 90847 FAMILY PSYTX W/PT 50 MIN: CPT | Performed by: PSYCHOLOGIST
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
## 2024-12-03 ENCOUNTER — APPOINTMENT (OUTPATIENT)
Dept: RADIATION ONCOLOGY | Facility: HOSPITAL | Age: 64
End: 2024-12-03
Attending: RADIOLOGY
Payer: MEDICARE

## 2024-12-03 DIAGNOSIS — C34.91 NON-SMALL CELL CARCINOMA OF LUNG, RIGHT (H): Primary | ICD-10-CM

## 2024-12-03 DIAGNOSIS — C79.51 MALIGNANT NEOPLASM METASTATIC TO BONE (H): ICD-10-CM

## 2024-12-03 PROCEDURE — 77301 RADIOTHERAPY DOSE PLAN IMRT: CPT | Mod: 26 | Performed by: RADIOLOGY

## 2024-12-03 PROCEDURE — 77338 DESIGN MLC DEVICE FOR IMRT: CPT | Performed by: RADIOLOGY

## 2024-12-03 PROCEDURE — 77293 RESPIRATOR MOTION MGMT SIMUL: CPT | Performed by: RADIOLOGY

## 2024-12-03 PROCEDURE — 77300 RADIATION THERAPY DOSE PLAN: CPT | Performed by: RADIOLOGY

## 2024-12-03 PROCEDURE — 77293 RESPIRATOR MOTION MGMT SIMUL: CPT | Mod: 26 | Performed by: RADIOLOGY

## 2024-12-03 PROCEDURE — 77300 RADIATION THERAPY DOSE PLAN: CPT | Mod: 26 | Performed by: RADIOLOGY

## 2024-12-03 PROCEDURE — 77338 DESIGN MLC DEVICE FOR IMRT: CPT | Mod: 26 | Performed by: RADIOLOGY

## 2024-12-03 PROCEDURE — 77301 RADIOTHERAPY DOSE PLAN IMRT: CPT | Performed by: RADIOLOGY

## 2024-12-06 ENCOUNTER — OFFICE VISIT (OUTPATIENT)
Dept: RADIATION ONCOLOGY | Facility: HOSPITAL | Age: 64
End: 2024-12-06
Attending: RADIOLOGY
Payer: MEDICARE

## 2024-12-06 DIAGNOSIS — C79.51 MALIGNANT NEOPLASM METASTATIC TO BONE (H): Primary | ICD-10-CM

## 2024-12-06 PROCEDURE — 77470 SPECIAL RADIATION TREATMENT: CPT | Performed by: RADIOLOGY

## 2024-12-06 PROCEDURE — 77470 SPECIAL RADIATION TREATMENT: CPT | Mod: 26 | Performed by: RADIOLOGY

## 2024-12-06 PROCEDURE — 77373 STRTCTC BDY RAD THER TX DLVR: CPT | Performed by: RADIOLOGY

## 2024-12-06 PROCEDURE — 77370 RADIATION PHYSICS CONSULT: CPT | Performed by: RADIOLOGY

## 2024-12-06 NOTE — LETTER
12/6/2024      Walt Estrada  [id number] [place] [address]      Dear Colleague,    Thank you for referring your patient, Walt Estrada, to the [facility] RADIATION [facility]COL[facility] [place]. Please see a copy of my visit note below.    Increased pain- oxycodone rx sent       Again, thank you for allowing me to participate in the care of your patient.        Sincerely,        Melanie Sommer MD

## 2024-12-07 DIAGNOSIS — C79.51 MALIGNANT NEOPLASM METASTATIC TO BONE (H): ICD-10-CM

## 2024-12-07 RX ORDER — OXYCODONE HYDROCHLORIDE 5 MG/1
5 TABLET ORAL EVERY 6 HOURS PRN
Qty: 20 TABLET | Refills: 0 | Status: SHIPPED | OUTPATIENT
Start: 2024-12-07

## 2024-12-07 RX ORDER — OXYCODONE HYDROCHLORIDE 5 MG/1
5 TABLET ORAL EVERY 6 HOURS PRN
Qty: 20 TABLET | Refills: 0 | Status: SHIPPED | OUTPATIENT
Start: 2024-12-07 | End: 2024-12-07

## 2024-12-09 ENCOUNTER — VIRTUAL VISIT (OUTPATIENT)
Dept: ONCOLOGY | Facility: CLINIC | Age: 64
End: 2024-12-09
Attending: INTERNAL MEDICINE
Payer: MEDICARE

## 2024-12-09 ENCOUNTER — APPOINTMENT (OUTPATIENT)
Dept: RADIATION ONCOLOGY | Facility: HOSPITAL | Age: 64
End: 2024-12-09
Attending: RADIOLOGY
Payer: MEDICARE

## 2024-12-09 DIAGNOSIS — C34.91 NON-SMALL CELL CARCINOMA OF LUNG, RIGHT (H): Primary | ICD-10-CM

## 2024-12-09 DIAGNOSIS — C79.51 MALIGNANT NEOPLASM METASTATIC TO BONE (H): ICD-10-CM

## 2024-12-09 PROCEDURE — 77373 STRTCTC BDY RAD THER TX DLVR: CPT | Performed by: STUDENT IN AN ORGANIZED HEALTH CARE EDUCATION/TRAINING PROGRAM

## 2024-12-09 PROCEDURE — 97802 MEDICAL NUTRITION INDIV IN: CPT | Mod: 95 | Performed by: DIETITIAN, REGISTERED

## 2024-12-09 NOTE — LETTER
12/9/2024      Walt Estrada  [address] [place] [address]      Dear Colleague,    Thank you for referring your patient, Walt Estrada, to the [facility] CAN[facility]ER [facility]. Please see a copy of my visit note below.    Video-Visit Details     Type of service:  Video Visit     Joined the call at 12/9/2024, 9:47:20 am.  Left the call at 12/9/2024, 10:41:30 am.  You were on the call for 54 minutes 9 seconds .    Originating Location (pt. Location): Home     Distant Location (provider location):  [facility] NUTRITION SERVICES      Mode of Communication:  Video Conference via Toppr    [facility] NUTRITION SERVICES - ASSESSMENT NOTE    Walt Estrada 64 year old referred for MNT related to lung cancer     Time Spent: 54 minutes  Visit Type: video  Pt accompanied by: his spouse, Aleyda  Referring Physician: Jose J 11/29/24  C34.91 (ICD-10-CM) - Non-small cell carcinoma of lung, right (H)   C79.51 (ICD-10-CM) - Malignant neoplasm metastatic to bone (H)       NUTRITION HISTORY  Factors affecting nutrition intake include:none at this time  Current diet/appetite: general diet/good appetite and intake; appetite was poor at time of diagnosis ~3-4 months ago  Chemotherapy: impending  Radiation: Started 12/6/24    Walt presents today to obtain guidance on his nutrition with radiation and prior to starting chemo.   He wants to be sure he's getting adequate nutrition for maintenance.    He has been having a significant amount of pain with the cancer in his sternum.    Appetite has been off due to pain but he thinks he's okay. He is concerned about lower appetite once he stars chemo and wants to be able to maintain.  He and his wife are staying with his son and daughter-in-law who are doing a lot of the meal planning and prepping on the weekends.  They then have these meals throughout the week.   They are trying to eat more whole foods and complex carbohydrates versus simple carbohydrates and sugars.   "    Diet Recall  Breakfast Egg bake w/ broccoli cheese onions, orange, 2 cups coffee   Lunch Bowl of leftovers   Dinner Stir teran rice forzen veggie, chicken, sauces, scalloped potatoes an ham  Frozen bag meals; salad kits   Snacks Belvita crackers   Beverages Water        Treatment Plan:  Oncology History   Malignant neoplasm of upper lobe of right lung (H)   11/29/2024 Initial Diagnosis    Malignant neoplasm of upper lobe of right lung (H)     11/29/2024 -  Cancer Staged    Staging form: Lung, AJCC 8th Edition  - Clinical: Stage IV (cTX, cNX, cM1)     Treatment plan has been reviewed.    ANTHROPOMETRICS  Height: 5'11\"  Weight: 226 lbs/102kg  BMI: 31  Weight Status:  Obesity Grade I BMI 30-34.9  IBW: 172 lbs (131%)  Dosing Weight: 84kg  Weight History: 10 lb wt loss in 2 weeks  Wt Readings from Last 10 Encounters:   11/29/24 102.8 kg (226 lb 11.2 oz)   11/14/24 99.8 kg (220 lb)   11/01/24 104.3 kg (230 lb)   10/30/24 104.4 kg (230 lb 2.6 oz)   10/23/24 102.1 kg (225 lb)   10/21/24 104.7 kg (230 lb 12.8 oz)   10/03/24 (P) 105.5 kg (232 lb 9.4 oz)   09/12/24 103.6 kg (228 lb 6.3 oz)   09/09/24 102.5 kg (226 lb)     Medications/vitamins/minerals/herbals:   Reviewed    Labs:   Labs reviewed    NUTRITION FOCUSED PHYSICAL ASSESSMENT FOR DIAGNOSING MALNUTRITION:  Consult for education only      ASSESSED NUTRITION NEEDS:  Estimated Energy Needs: 5369-2176 kcals (25-30 Kcal/Kg)  Justification: maintenance  Estimated Protein Needs:  grams protein (1-1.2 g pro/Kg)  Justification: maintenance  Estimated Fluid Needs: 2000  mL   Justification: maintenance    MALNUTRITION:  % Weight Loss:  > 2% in 1 week (severe malnutrition)  % Intake:  No decreased intake noted at this time  Subcutaneous Fat Loss:  Not observed with virtual visit  Muscle Loss:   Not observed with virtual visit  Fluid Retention:  None noted    Malnutrition Diagnosis: Patient does not meet two of the above criteria necessary for diagnosing " malnutrition    NUTRITION DIAGNOSIS:  Inadequate oral intake related to decreased ability to consume sufficient energy due to side effects of cancer therapy as evidenced by 10 wt loss in 2 weeks dietary intake 50-75% estimated needs.      INTERVENTIONS  Provided written & verbal education:     - Discussed nutrition and hydration needs and importance of weight maintenance within 1-2 lb each week during treatment. Encouraged pt to aim for at least 2100-2500kcal and 80-100g protein, 10+ cups non-caffeine containing beverages (water/electrolytes) daily.    - Discussed strategies to help fortify meals and snacks. Encouraged to focus on small, frequent meals.    - Reviewed sources of protein. Encouraged to have a protein source with each meal and snack.    - Reviewed high calorie/high protein oral nutrition supplement options ([employer] shakes, OWYN shakes etc).   - Encouraged utilizing these ONS in home made shakes/smoothies to prevent flavor fatigue.    - Discussed food safety guidelines.  Discussed vitamin/minerals okay and not okay to take during treatment.    - Reviewed and encouraged mediterranean diet as able.     Provided pt with corresponding education materials/handouts on:  --Sources of protein  --Thrive recipes  --Tips to increase calories in your diet  --High calorie, high protein recipes  --Mediterranean nutrition therapy   --Hydration options     Link to Cor[employer] Lace:  www.[email or web address].com    Core Power Protein Shakes: High Protein Drinks for Recovery and Nutrition - [employer]  After you work out, you can feel worn out. Core Power fights post-workout fatigue and dehydration with high-quality protein and electrolytes. Made from [employer] ultra-filtered milk, Core Power is a delicious way to maximize your recovery and get the nutrition you need to power on!  Parrut    Link to Yorder shakes:  All Products  Plant Protein  OWYN (liveowyn.com)    Goals  --Weight maintenance (no more than 1-2 lb wt loss each  week during treatment)  --Fluids:  oz/day (non-caffeine)   --Protein:  grams/day  --Calories: 7242-6816/day  --Vitamin D and Calcium are both okay to take during treatment.      Follow-Up Plans: Pt has RD contact information for questions.      Jessica Figueroa RD, , LD      Again, thank you for allowing me to participate in the care of your patient.        Sincerely,        Jessica Figueroa RD

## 2024-12-09 NOTE — PATIENT INSTRUCTIONS
Alen Godoy,     Here are some resources that you might find helpful (see your email):  --Sources of protein  --Thrive recipes  --Tips to increase calories in your diet  --High calorie, high protein recipes  --Mediterranean nutrition therapy   --Hydration options     Link to [employer]AND[employer]:  www.[email or web address]    Core Power Protein Shakes: High Protein Drinks for Recovery and Nutrition - [employer]  After you work out, you can feel worn out. Core Power fights post-workout fatigue and dehydration with high-quality protein and electrolytes. Made from [employer] ultra-filtered milk, Core Power is a delicious way to maximize your recovery and get the nutrition you need to power on!  [employer]    Link to [employer]:  All Products  Plant Protein  [employer])    Nutrition goals during treatment  --Fluids: 80+ oz/day (non-caffeine)   --Protein:  grams/day  --Calories: 2265-2664/day  --Vitamin D and Calcium are both okay to take during treatment.      Please reach out to me with further questions along the way!      All the best,     Jessica Figueroa RD, , LD  Board Certified Specialist in Oncology Nutrition  [facility]  Outpatient Services  [email or web address]   Office: [phone]  Fax: [phone]

## 2024-12-09 NOTE — PROGRESS NOTES
Video-Visit Details     Type of service:  Video Visit     Joined the call at 12/9/2024, 9:47:20 am.  Left the call at 12/9/2024, 10:41:30 am.  You were on the call for 54 minutes 9 seconds .    Originating Location (pt. Location): Home     Distant Location (provider location):  [facility] NUTRITION SERVICES      Mode of Communication:  Video Conference via [facility] NUTRITION SERVICES - ASSESSMENT NOTE    Walt Estrada 64 year old referred for MNT related to lung cancer     Time Spent: 54 minutes  Visit Type: video  Pt accompanied by: his spouse, Aleyda  Referring Physician: Jose J 11/29/24  C34.91 (ICD-10-CM) - Non-small cell carcinoma of lung, right (H)   C79.51 (ICD-10-CM) - Malignant neoplasm metastatic to bone (H)       NUTRITION HISTORY  Factors affecting nutrition intake include:none at this time  Current diet/appetite: general diet/good appetite and intake; appetite was poor at time of diagnosis ~3-4 months ago  Chemotherapy: impending  Radiation: Started 12/6/24    Walt presents today to obtain guidance on his nutrition with radiation and prior to starting chemo.   He wants to be sure he's getting adequate nutrition for maintenance.    He has been having a significant amount of pain with the cancer in his sternum.    Appetite has been off due to pain but he thinks he's okay. He is concerned about lower appetite once he stars chemo and wants to be able to maintain.  He and his wife are staying with his son and daughter-in-law who are doing a lot of the meal planning and prepping on the weekends.  They then have these meals throughout the week.   They are trying to eat more whole foods and complex carbohydrates versus simple carbohydrates and sugars.      Diet Recall  Breakfast Egg bake w/ broccoli cheese onions, orange, 2 cups coffee   Lunch Bowl of leftovers   Dinner Stir teran rice forzen veggie, chicken, sauces, scalloped potatoes an ham  Frozen bag meals; salad kits   Snacks  "Belvita crackers   Beverages Water        Treatment Plan:  Oncology History   Malignant neoplasm of upper lobe of right lung (H)   11/29/2024 Initial Diagnosis    Malignant neoplasm of upper lobe of right lung (H)     11/29/2024 -  Cancer Staged    Staging form: Lung, AJCC 8th Edition  - Clinical: Stage IV (cTX, cNX, cM1)     Treatment plan has been reviewed.    ANTHROPOMETRICS  Height: 5'11\"  Weight: 226 lbs/102kg  BMI: 31  Weight Status:  Obesity Grade I BMI 30-34.9  IBW: 172 lbs (131%)  Dosing Weight: 84kg  Weight History: 10 lb wt loss in 2 weeks  Wt Readings from Last 10 Encounters:   11/29/24 102.8 kg (226 lb 11.2 oz)   11/14/24 99.8 kg (220 lb)   11/01/24 104.3 kg (230 lb)   10/30/24 104.4 kg (230 lb 2.6 oz)   10/23/24 102.1 kg (225 lb)   10/21/24 104.7 kg (230 lb 12.8 oz)   10/03/24 (P) 105.5 kg (232 lb 9.4 oz)   09/12/24 103.6 kg (228 lb 6.3 oz)   09/09/24 102.5 kg (226 lb)     Medications/vitamins/minerals/herbals:   Reviewed    Labs:   Labs reviewed    NUTRITION FOCUSED PHYSICAL ASSESSMENT FOR DIAGNOSING MALNUTRITION:  Consult for education only      ASSESSED NUTRITION NEEDS:  Estimated Energy Needs: 7432-8339 kcals (25-30 Kcal/Kg)  Justification: maintenance  Estimated Protein Needs:  grams protein (1-1.2 g pro/Kg)  Justification: maintenance  Estimated Fluid Needs: 2000  mL   Justification: maintenance    MALNUTRITION:  % Weight Loss:  > 2% in 1 week (severe malnutrition)  % Intake:  No decreased intake noted at this time  Subcutaneous Fat Loss:  Not observed with virtual visit  Muscle Loss:   Not observed with virtual visit  Fluid Retention:  None noted    Malnutrition Diagnosis: Patient does not meet two of the above criteria necessary for diagnosing malnutrition    NUTRITION DIAGNOSIS:  Inadequate oral intake related to decreased ability to consume sufficient energy due to side effects of cancer therapy as evidenced by 10 wt loss in 2 weeks dietary intake 50-75% estimated needs.  "     INTERVENTIONS  Provided written & verbal education:     - Discussed nutrition and hydration needs and importance of weight maintenance within 1-2 lb each week during treatment. Encouraged pt to aim for at least 2100-2500kcal and 80-100g protein, 10+ cups non-caffeine containing beverages (water/electrolytes) daily.    - Discussed strategies to help fortify meals and snacks. Encouraged to focus on small, frequent meals.    - Reviewed sources of protein. Encouraged to have a protein source with each meal and snack.    - Reviewed high calorie/high protein oral nutrition supplement options (Fairlife shakes, OWYN shakes etc).   - Encouraged utilizing these ONS in home made shakes/smoothies to prevent flavor fatigue.    - Discussed food safety guidelines.  Discussed vitamin/minerals okay and not okay to take during treatment.    - Reviewed and encouraged mediterranean diet as able.     Provided pt with corresponding education materials/handouts on:  --Sources of protein  --Thrive recipes  --Tips to increase calories in your diet  --High calorie, high protein recipes  --Mediterranean nutrition therapy   --Hydration options     Link to StudyCloud:  www.[email or web address].com    Core Power Protein Shakes: High Protein Drinks for Recovery and Nutrition - [employer]  After you work out, you can feel worn out. Core Power fights post-workout fatigue and dehydration with high-quality protein and electrolytes. Made from [employer] ultra-filtered milk, Core Power is a delicious way to maximize your recovery and get the nutrition you need to power on!  [employer]    Link to Enforta:  All Products  Plant Protein  OWYN (liveowyn.com)    Goals  --Weight maintenance (no more than 1-2 lb wt loss each week during treatment)  --Fluids:  oz/day (non-caffeine)   --Protein:  grams/day  --Calories: 1930-0101/day  --Vitamin D and Calcium are both okay to take during treatment.      Follow-Up Plans: Pt has RD contact information  for questions.      Jessica Figueroa RD, , LD

## 2024-12-10 ENCOUNTER — PATIENT OUTREACH (OUTPATIENT)
Dept: ONCOLOGY | Facility: HOSPITAL | Age: 64
End: 2024-12-10
Payer: MEDICARE

## 2024-12-10 ENCOUNTER — ONCOLOGY VISIT (OUTPATIENT)
Dept: ONCOLOGY | Facility: HOSPITAL | Age: 64
End: 2024-12-10
Attending: PSYCHOLOGIST
Payer: MEDICARE

## 2024-12-10 DIAGNOSIS — F43.21 ADJUSTMENT DISORDER WITH DEPRESSED MOOD: Primary | ICD-10-CM

## 2024-12-10 PROCEDURE — 90837 PSYTX W PT 60 MINUTES: CPT | Performed by: PSYCHOLOGIST

## 2024-12-10 NOTE — PROGRESS NOTES
[facility]: Cancer Care                                                                                      Replied to MyC.       Signature:  Apryl Bailon RN
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
## 2024-12-11 ENCOUNTER — APPOINTMENT (OUTPATIENT)
Dept: RADIATION ONCOLOGY | Facility: HOSPITAL | Age: 64
End: 2024-12-11
Attending: RADIOLOGY
Payer: MEDICARE

## 2024-12-11 PROCEDURE — 77373 STRTCTC BDY RAD THER TX DLVR: CPT | Performed by: STUDENT IN AN ORGANIZED HEALTH CARE EDUCATION/TRAINING PROGRAM
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
## 2024-12-13 ENCOUNTER — APPOINTMENT (OUTPATIENT)
Dept: RADIATION ONCOLOGY | Facility: HOSPITAL | Age: 64
End: 2024-12-13
Attending: RADIOLOGY
Payer: MEDICARE

## 2024-12-13 PROCEDURE — 77373 STRTCTC BDY RAD THER TX DLVR: CPT | Performed by: RADIOLOGY

## 2024-12-16 ENCOUNTER — OFFICE VISIT (OUTPATIENT)
Dept: RADIATION ONCOLOGY | Facility: HOSPITAL | Age: 64
End: 2024-12-16
Attending: RADIOLOGY
Payer: MEDICARE

## 2024-12-16 DIAGNOSIS — C34.91 NON-SMALL CELL CARCINOMA OF LUNG, RIGHT (H): Primary | ICD-10-CM

## 2024-12-16 DIAGNOSIS — C79.51 MALIGNANT NEOPLASM METASTATIC TO BONE (H): ICD-10-CM

## 2024-12-16 PROCEDURE — 77435 SBRT MANAGEMENT: CPT | Performed by: RADIOLOGY

## 2024-12-16 PROCEDURE — 77336 RADIATION PHYSICS CONSULT: CPT | Performed by: RADIOLOGY

## 2024-12-16 PROCEDURE — 77373 STRTCTC BDY RAD THER TX DLVR: CPT | Performed by: RADIOLOGY

## 2024-12-16 NOTE — LETTER
2024      Walt Estrada  [address]  [place] [address]      Dear Colleague,    Thank you for referring your patient, Walt Estrada, to the [facility] RADIATION ONCOLOGY [place]. Please see a copy of my visit note below.    SBRT/SRS Treatment Summary    Patient: Walt Estrada   MRN: [id number]  : 1960  Care Provider: Melanie Sommer MD    Date of Service: Dec 16, 2024      Elizabeth Lux MD  [address] [place] [id number]            Dear Dr. Lux:     Your patient Mr. Walt Estrada completed his radiation therapy on 2024. As you know Mr. Estrada is a 64 year old male with a diagnosis of adenocarcinoma involving right lung with oligometastasis of the sternum, clinical stage T1 N0 M1.  The patient received stereotactic radiosurgery for right lung and the sternum with a total dose of 4500 (sternum) and 5000 (lung) cGy in 5 treatments given from 2024 - 2024.  The patient tolerated ration therapy well with minimal side effect.  He is scheduled to return to radiation oncology in 4 weeks for a routine post therapy office follow-up.    Again, thank you very much for the referral and allowing me to participate in the care of this patient.  If you have any questions or concerns about this patient, please do not hesitate to call.      Pain Management Plan: NA          Sincerely,      Melanie Sommer MD, PhD  Department of Radiation Oncology   [facility] Radiation Oncology  Cell: [phone]    [facility]  [address] [place] [address]     [address]   [place] [address]    CC:  Patient Care Team:  System, Provider Not In as PCP - General (Clinic)  Brendon Loya MD as MD (Gastroenterology)  Ivy Gurrola PA-C as Physician Assistant (Anesthesiology)  Srinivas Interiano MD as Physician (Surgery)  Elizabeth Lux MD as MD (Medical Oncology)  Apryl Bailon, RN as Specialty Care Coordinator (Hematology & Oncology)  Adi Zuluaga  Kostas Chiu LP as Assigned Behavioral Health Provider  Srinivas Interiano MD as Assigned Surgical Provider  Melanie Sommer MD as MD (Radiation Oncology)      RADIATION ONCOLOGY WEEKLY TREATMENT VISIT NOTE      Assessment / Impression       Visit Dx:  (C34.91) Non-small cell carcinoma of lung, right (H)  (primary encounter diagnosis)    (C79.51) Malignant neoplasm metastatic to bone (H)     Tolerating radiation therapy well.  All questions and concerns addressed.    Plan:     Follow-up with radiation oncology in 4 weeks.    Pain Management Plan: NA    Subjective:      HPI: Walt Estrada is a 64 year old male with  No primary diagnosis found.    The following portions of the patient's history were reviewed and updated as appropriate: allergies, current medications, past family history, past medical history, past social history, past surgical history and problem list.    Assessment                  Body Site:  Thoracic Site: sternum & RUL  Stereotactic Radiosurgery: Yes  Stereotactic Radiosurgery date: 12/16/24  Concurrent Therapy: No  Today's Dose: 4500 sternum, 5000 RUL  Total Dose for Thoracic: 5/5 for both sites  Today's Fraction/Total Fraction Thoracic: 4500/4500 sternum, 5000/5000 RUL                                   Sexuality Alteration                    Emotional Alteration       Comfort Alteration       Nutrition Alteration      Skin Alteration      AUA Assessment                                           Accompanied by       Objective:     Exam: no Erythema.    There were no vitals filed for this visit.    Wt Readings from Last 8 Encounters:   11/29/24 102.8 kg (226 lb 11.2 oz)   11/14/24 99.8 kg (220 lb)   11/01/24 104.3 kg (230 lb)   10/30/24 104.4 kg (230 lb 2.6 oz)   10/23/24 102.1 kg (225 lb)   10/21/24 104.7 kg (230 lb 12.8 oz)   10/03/24 (P) 105.5 kg (232 lb 9.4 oz)   09/12/24 103.6 kg (228 lb 6.3 oz)       General: Alert and oriented, in no acute distress  Walt has no Erythema.  Aria chart and  setup information reviewed    Melanie Sommer MD      Again, thank you for allowing me to participate in the care of your patient.        Sincerely,        Melanie Sommer MD

## 2024-12-16 NOTE — PROGRESS NOTES
SBRT/SRS Treatment Summary    Patient: Walt Estrada   MRN: [id number]  : 1960  Care Provider: Melanie Sommer MD    Date of Service: Dec 16, 2024      Elizabeth Lux MD  [address] [place] [address]            Dear Dr. Lux:     Your patient Mr. Walt Estrada completed his radiation therapy on 2024. As you know Mr. Estrada is a 64 year old male with a diagnosis of adenocarcinoma involving right lung with oligometastasis of the sternum, clinical stage T1 N0 M1.  The patient received stereotactic radiosurgery for right lung and the sternum with a total dose of 4500 (sternum) and 5000 (lung) cGy in 5 treatments given from 2024 - 2024.  The patient tolerated ration therapy well with minimal side effect.  He is scheduled to return to radiation oncology in 4 weeks for a routine post therapy office follow-up.    Again, thank you very much for the referral and allowing me to participate in the care of this patient.  If you have any questions or concerns about this patient, please do not hesitate to call.      Pain Management Plan: NA          Sincerely,      Melanie Sommer MD, PhD  Department of Radiation Oncology   [facility] Radiation Oncology  Cell: [phone]    [facility]  [address] [place] [address]     [address] [place] [address]    CC:  Patient Care Team:  System, Provider Not In as PCP - General (Clinic)  Brendon Loya MD as MD (Gastroenterology)  Ivy Gurrola PA-C as Physician Assistant (Anesthesiology)  Srinivas Interiano MD as Physician (Surgery)  Elizabeth Lux MD as MD (Medical Oncology)  Apryl Bailon, RN as Specialty Care Coordinator (Hematology & Oncology)  Adi Zuluaga LP as Assigned Behavioral Health Provider  Srinivas Interiano MD as Assigned Surgical Provider  Melanie Sommer MD as MD (Radiation Oncology)

## 2024-12-16 NOTE — PROGRESS NOTES
Pt here for their final radiation treatment. DC instructions given verbally and in writing, pt verbalized their understanding. Encouraged pt to make 2-3 month follow up apt with scan prior, on their way out today.

## 2024-12-16 NOTE — PROGRESS NOTES
RADIATION ONCOLOGY WEEKLY TREATMENT VISIT NOTE      Assessment / Impression       Visit Dx:  (C34.91) Non-small cell carcinoma of lung, right (H)  (primary encounter diagnosis)    (C79.51) Malignant neoplasm metastatic to bone (H)     Tolerating radiation therapy well.  All questions and concerns addressed.    Plan:     Follow-up with radiation oncology in 4 weeks.    Pain Management Plan: NA    Subjective:      HPI: Walt Estrada is a 64 year old male with  No primary diagnosis found.    The following portions of the patient's history were reviewed and updated as appropriate: allergies, current medications, past family history, past medical history, past social history, past surgical history and problem list.    Assessment                  Body Site:  Thoracic Site: sternum & RUL  Stereotactic Radiosurgery: Yes  Stereotactic Radiosurgery date: 12/16/24  Concurrent Therapy: No  Today's Dose: 4500 sternum, 5000 RUL  Total Dose for Thoracic: 5/5 for both sites  Today's Fraction/Total Fraction Thoracic: 4500/4500 sternum, 5000/5000 RUL                                   Sexuality Alteration                    Emotional Alteration       Comfort Alteration       Nutrition Alteration      Skin Alteration      AUA Assessment                                           Accompanied by       Objective:     Exam: no Erythema.    There were no vitals filed for this visit.    Wt Readings from Last 8 Encounters:   11/29/24 102.8 kg (226 lb 11.2 oz)   11/14/24 99.8 kg (220 lb)   11/01/24 104.3 kg (230 lb)   10/30/24 104.4 kg (230 lb 2.6 oz)   10/23/24 102.1 kg (225 lb)   10/21/24 104.7 kg (230 lb 12.8 oz)   10/03/24 (P) 105.5 kg (232 lb 9.4 oz)   09/12/24 103.6 kg (228 lb 6.3 oz)       General: Alert and oriented, in no acute distress  Walt has no Erythema.  Aria chart and setup information reviewed    Melanie Sommer MD

## 2024-12-20 ENCOUNTER — MYC MEDICAL ADVICE (OUTPATIENT)
Dept: INTERVENTIONAL RADIOLOGY/VASCULAR | Facility: CLINIC | Age: 64
End: 2024-12-20
Payer: MEDICARE

## 2024-12-23 NOTE — TELEPHONE ENCOUNTER
Detailed voicemail message left with request for patient to contact Interventional Radiology Department and acknowledge understanding of MYCHART instructions that were sent in anticipation of procedure scheduled 12/30/2024.      IR contact number provided in [facility].    H&P noted in patient chart with date of 12/18/2024.  Provider name SERVANDO ROSSI  Interventional Radiology RN

## 2024-12-27 NOTE — PROGRESS NOTES
Interventional Radiology - Pre-Procedure Note:  Outpatient - [facility]  12/30/2024     Procedure Requested: Port Placement, No laterality delineated on order request.  Requested by: Elizabeth Lux MD     History and Physical Reviewed: H&P documented within 30 days (by Elizabeth Lux MD  on 12/18/24). I have personally reviewed the patient's medical history and have updated the medical record as necessary.    Past Medical History:   Diagnosis Date    BCC (basal cell carcinoma), face     Dyslipidemia     Essential hypertension     GERD (gastroesophageal reflux disease)         Brief HPI: Walt Estrada is a 64 year old male diagnosed with right upper lung and sternal mass lesion adenocarcinoma consistent with metastatic lung cancer, with plans for chemotherapy. Port placement requested.       NPO: Midnight.  ANTICOAGULANTS: Aspirin 81 mg PO daily - No hold required per SIR guidelines.  ANTIBIOTICS: Ancef sign and held for procedure...    ALLERGIES  Allergies   Allergen Reactions    Penicillins Unknown     Other reaction(s): Gastrointestinal         LABS:  INR   Date Value Ref Range Status   10/30/2024 0.98 0.85 - 1.15 Final      Hemoglobin   Date Value Ref Range Status   10/03/2024 14.2 13.3 - 17.7 g/dL Final     Platelet Count   Date Value Ref Range Status   10/03/2024 193 150 - 450 10e3/uL Final     Creatinine   Date Value Ref Range Status   10/03/2024 0.80 0.67 - 1.17 mg/dL Final     Potassium   Date Value Ref Range Status   10/03/2024 3.9 3.4 - 5.3 mmol/L Final         EXAM:  BP (!) 140/82 (BP Location: Left arm)   Pulse 64   Temp 98.1  F (36.7  C) (Oral)   Resp 20   SpO2 95%   General: Stable. In no acute distress.    Neuro: Alert and oriented x 3. Speech clear. No focal deficits.  Psych: Appropriate mood and affect. Cooperative. Answering questions appropriately.  Resp: Normal respirations. Unlabored breathing. Lungs clear to auscultation bilaterally.  Cardio: S1S2,  regular rate and rhythm, without murmur, clicks or rubs  Skin: Warm and dry. Without excoriations, ecchymosis, erythema, lesions or open sores on bilateral upper chest and neck.      Pre-Sedation Assessment:  Mallampati Airway Classification:  II - Faucial pillars and soft palate may be seen, but uvula is masked by the base of the tongue  Previous reaction to anesthesia/sedation:  No  Sedation plan based on assessment: Moderate (conscious) sedation  ASA Classification: Class 3 - SEVERE SYSTEMIC DISEASE, DEFINITE FUNCTIONAL LIMITATIONS.   Code Status: Full Code intra procedure, per discussion with patient.       ASSESSMENT/PLAN:   #metastatic adenocarcinoma right upper lobe lung cancer   #radiation completed  #chemotherapy initiation    Port placement with sedation.    Procedural education reviewed with patient/family in detail including, but not limited to risks, benefits and alternatives with understanding verbalized by patient/family.    Through chart review and discussion with patient there is no contraindication for righ chest port placement. Anticipating likely right chest port placement, though ultimately laterality to be determined by interventionalist.     Total time spent on the date of the encounter: 35 minutes.      MATEO Herr CNP  Interventional Radiology

## 2024-12-30 ENCOUNTER — HOSPITAL ENCOUNTER (OUTPATIENT)
Dept: INTERVENTIONAL RADIOLOGY/VASCULAR | Facility: HOSPITAL | Age: 64
Discharge: HOME | End: 2024-12-30
Attending: INTERNAL MEDICINE | Admitting: RADIOLOGY
Payer: MEDICARE

## 2024-12-30 VITALS
SYSTOLIC BLOOD PRESSURE: 124 MMHG | DIASTOLIC BLOOD PRESSURE: 81 MMHG | TEMPERATURE: 98.1 F | HEART RATE: 77 BPM | RESPIRATION RATE: 12 BRPM | OXYGEN SATURATION: 96 %

## 2024-12-30 DIAGNOSIS — C34.11 MALIGNANT NEOPLASM OF UPPER LOBE OF RIGHT LUNG (H): ICD-10-CM

## 2024-12-30 DIAGNOSIS — C79.51 MALIGNANT NEOPLASM METASTATIC TO BONE (H): ICD-10-CM

## 2024-12-30 PROCEDURE — 76937 US GUIDE VASCULAR ACCESS: CPT

## 2024-12-30 PROCEDURE — 250N000009 HC RX 250: Performed by: NURSE PRACTITIONER

## 2024-12-30 PROCEDURE — C1769 GUIDE WIRE: HCPCS

## 2024-12-30 PROCEDURE — 250N000011 HC RX IP 250 OP 636: Performed by: RADIOLOGY

## 2024-12-30 PROCEDURE — C1788 PORT, INDWELLING, IMP: HCPCS

## 2024-12-30 PROCEDURE — 250N000011 HC RX IP 250 OP 636: Performed by: NURSE PRACTITIONER

## 2024-12-30 PROCEDURE — 272N000500 HC NEEDLE CR2

## 2024-12-30 RX ORDER — FENTANYL CITRATE 50 UG/ML
25-50 INJECTION, SOLUTION INTRAMUSCULAR; INTRAVENOUS EVERY 5 MIN PRN
Status: DISCONTINUED | OUTPATIENT
Start: 2024-12-30 | End: 2024-12-31 | Stop reason: HOSPADM

## 2024-12-30 RX ORDER — NALOXONE HYDROCHLORIDE 0.4 MG/ML
0.4 INJECTION, SOLUTION INTRAMUSCULAR; INTRAVENOUS; SUBCUTANEOUS
Status: DISCONTINUED | OUTPATIENT
Start: 2024-12-30 | End: 2024-12-31 | Stop reason: HOSPADM

## 2024-12-30 RX ORDER — ONDANSETRON 2 MG/ML
4 INJECTION INTRAMUSCULAR; INTRAVENOUS
Status: DISCONTINUED | OUTPATIENT
Start: 2024-12-30 | End: 2024-12-31 | Stop reason: HOSPADM

## 2024-12-30 RX ORDER — NALOXONE HYDROCHLORIDE 0.4 MG/ML
0.2 INJECTION, SOLUTION INTRAMUSCULAR; INTRAVENOUS; SUBCUTANEOUS
Status: DISCONTINUED | OUTPATIENT
Start: 2024-12-30 | End: 2024-12-31 | Stop reason: HOSPADM

## 2024-12-30 RX ORDER — HEPARIN SODIUM (PORCINE) LOCK FLUSH IV SOLN 100 UNIT/ML 100 UNIT/ML
500 SOLUTION INTRAVENOUS ONCE
Status: COMPLETED | OUTPATIENT
Start: 2024-12-30 | End: 2024-12-30

## 2024-12-30 RX ORDER — CEFAZOLIN SODIUM/WATER 2 G/20 ML
2 SYRINGE (ML) INTRAVENOUS
Status: COMPLETED | OUTPATIENT
Start: 2024-12-30 | End: 2024-12-30

## 2024-12-30 RX ORDER — LIDOCAINE 40 MG/G
CREAM TOPICAL
Status: DISCONTINUED | OUTPATIENT
Start: 2024-12-30 | End: 2024-12-31 | Stop reason: HOSPADM

## 2024-12-30 RX ORDER — FLUMAZENIL 0.1 MG/ML
0.2 INJECTION, SOLUTION INTRAVENOUS
Status: DISCONTINUED | OUTPATIENT
Start: 2024-12-30 | End: 2024-12-31 | Stop reason: HOSPADM

## 2024-12-30 RX ORDER — HEPARIN SODIUM 200 [USP'U]/100ML
1 INJECTION, SOLUTION INTRAVENOUS EVERY 5 MIN PRN
Status: DISCONTINUED | OUTPATIENT
Start: 2024-12-30 | End: 2024-12-31 | Stop reason: HOSPADM

## 2024-12-30 RX ORDER — LIDOCAINE HYDROCHLORIDE AND EPINEPHRINE 10; 10 MG/ML; UG/ML
INJECTION, SOLUTION INFILTRATION; PERINEURAL PRN
Status: COMPLETED | OUTPATIENT
Start: 2024-12-30 | End: 2024-12-30

## 2024-12-30 RX ADMIN — Medication 2 G: at 09:03

## 2024-12-30 RX ADMIN — FENTANYL CITRATE 50 MCG: 50 INJECTION, SOLUTION INTRAMUSCULAR; INTRAVENOUS at 09:15

## 2024-12-30 RX ADMIN — LIDOCAINE HYDROCHLORIDE AND EPINEPHRINE 10 ML: 10; 10 INJECTION, SOLUTION INFILTRATION; PERINEURAL at 09:17

## 2024-12-30 RX ADMIN — MIDAZOLAM HYDROCHLORIDE 0.5 MG: 1 INJECTION, SOLUTION INTRAMUSCULAR; INTRAVENOUS at 09:23

## 2024-12-30 RX ADMIN — MIDAZOLAM HYDROCHLORIDE 1 MG: 1 INJECTION, SOLUTION INTRAMUSCULAR; INTRAVENOUS at 09:07

## 2024-12-30 RX ADMIN — LIDOCAINE HYDROCHLORIDE 1 ML: 10 INJECTION, SOLUTION INFILTRATION; PERINEURAL at 09:16

## 2024-12-30 RX ADMIN — HEPARIN SODIUM (PORCINE) LOCK FLUSH IV SOLN 100 UNIT/ML 500 UNITS: 100 SOLUTION at 09:23

## 2024-12-30 RX ADMIN — FENTANYL CITRATE 50 MCG: 50 INJECTION, SOLUTION INTRAMUSCULAR; INTRAVENOUS at 09:09

## 2024-12-30 RX ADMIN — MIDAZOLAM HYDROCHLORIDE 1 MG: 1 INJECTION, SOLUTION INTRAMUSCULAR; INTRAVENOUS at 09:13

## 2024-12-30 NOTE — PROCEDURES
[facility]    Procedure: IR Procedure Note    Date/Time: 12/30/2024 9:36 AM    Performed by: Buzz Giles MD  Authorized by: Buzz Giles MD  IR Fellow Physician:    Pre Procedure Diagnosis: Plans for chemotherapy  Post Procedure Diagnosis: Plans for chemotherapy    UNIVERSAL PROTOCOL   Site Marked: Yes  Prior Images Obtained and Reviewed:  NA  Required items: Required blood products, implants, devices and special equipment available    Patient identity confirmed:  Verbally with patient and arm band  Patient was reevaluated immediately before administering moderate or deep sedation or anesthesia  Confirmation Checklist:  Patient's identity using two indicators, relevant allergies, procedure was appropriate and matched the consent or emergent situation and correct equipment/implants were available  Time out: Immediately prior to the procedure a time out was called    Universal Protocol: the Joint Commission Universal Protocol was followed    ESBL (mL):  0     ANESTHESIA    Local Anesthetic:  Lidocaine 1% with epinephrine and lidocaine 1% without epinephrine  Anesthetic Total (mL):  20      SEDATION  Patient Sedated: Yes    Sedation Type:  Moderate (conscious) sedation  Vital signs: Vital signs monitored during sedation    Fluoroscopy Time: 1 minute(s)  Findings: Successful placement of right internal jugular chest port.  OK to discharge in 1 hour.    Specimens: none    Procedural Complications: None    Condition: Stable    Plan: Successful placement of right internal jugular chest port.  OK to discharge in 1 hour.      PROCEDURE  Describe Procedure: Successful placement of right internal jugular chest port.  OK to discharge in 1 hour.  Length of time physician/provider present for 1:1 monitoring during sedation:  0-22 min

## 2024-12-30 NOTE — DISCHARGE INSTRUCTIONS
Port Placement Procedure Discharge Instructions:  You had a port placed. A port is a small medical device that is placed under the skin and is connected to a vein with a catheter (thin, flexible tube). Ports can be used to administer IV medications (including chemotherapy), fluids or blood products or for blood lab draws. Please follow the below instructions after your procedure:    Care Instructions:  - If you received sedation for your procedure, do not drive or operate heavy machinery for the rest of the day.  - You may shower beginning tomorrow (post procedure day #1). Do not scrub site until well healed; pat dry gently with a towel.  - You likely have skin adhesive over your port site. Skin adhesive works like a bandage to keep the site covered and protected. Do not use antibiotic ointment or creams/lotions over adhesive as it can break it down. The skin adhesive will peel off on its own (typically in 5-14 days).  - Avoid submerging the port site under water (ex: tub baths, Jacuzzis, lakes, hot tubs and pools) for 10 days or until your site is well healed.  - You may have some discomfort, minimal swelling, redness and/or bruising at your port site/procedure site. You may take over the counter pain medication for discomfort (follow the package directions) or apply an ice pack wrapped in a towel over the site (rotating 20 minutes with ice pack on and 20 minutes with ice pack off) for comfort as needed. It can take several days for these to resolve.  - Avoid heavy lifting (greater than 10 pounds) and strenuous activities for 2 days following your procedure.   - If you experience significant bleeding at site, apply pressure with hands above the clavicle bone, sit upright and seek immediate medical assistance.  - Ports need to be flushed approximately every 4-6 weeks, if not being used more frequently. Follow up with the provider who ordered your port placement for further instructions for this.    Seek medical  evaluation or contact [facility] RN Line at [phone] if you experience the following:  - Uncontrolled bleeding from port site  - Fever (greater than 101 F (38.3C))  - Purulent (yellow/green/foul smelling) drainage from port insertion site  - Increasing pain at port site  - Increasing redness at port site

## 2024-12-30 NOTE — PRE-PROCEDURE
GENERAL PRE-PROCEDURE:   Procedure:  Port placement  Date/Time:  12/30/2024 9:02 AM    Written consent obtained?: Yes    Risks and benefits: Risks, benefits and alternatives were discussed    Consent given by:  Patient  Patient states understanding of procedure being performed: Yes    Patient's understanding of procedure matches consent: Yes    Procedure consent matches procedure scheduled: Yes    Expected level of sedation:  Moderate  Appropriately NPO:  Yes  ASA Class:  3  Mallampati  :  Grade 2- soft palate, base of uvula, tonsillar pillars, and portion of posterior pharyngeal wall visible  Lungs:  Lungs clear with good breath sounds bilaterally  Heart:  Normal heart sounds and rate  History & Physical reviewed:  History and physical reviewed and no updates needed  Statement of review:  I have reviewed the lab findings, diagnostic data, medications, and the plan for sedation

## 2024-12-30 NOTE — IR NOTE
Patient Name: Walt Estrada  Medical Record Number: [id number]  Today's Date: 12/30/2024    Procedure: Port  Proceduralist: Tisha    Procedure Start: 0913  Procedure end: 0928  Sedation medications administered: 2.5 mg midazolam and 100 mcg fentanyl   Sedation time: 15 minutes    Patient and spouse verbalized understanding of discharge teaching. Ambulatory at discharge.

## 2025-01-02 ENCOUNTER — PATIENT OUTREACH (OUTPATIENT)
Dept: ONCOLOGY | Facility: HOSPITAL | Age: 65
End: 2025-01-02
Payer: MEDICARE

## 2025-01-02 DIAGNOSIS — C79.51 MALIGNANT NEOPLASM METASTATIC TO BONE (H): Primary | ICD-10-CM

## 2025-01-02 DIAGNOSIS — C34.11 MALIGNANT NEOPLASM OF UPPER LOBE OF RIGHT LUNG (H): ICD-10-CM

## 2025-01-02 RX ORDER — ONDANSETRON 8 MG/1
8 TABLET, FILM COATED ORAL EVERY 8 HOURS PRN
Qty: 30 TABLET | Refills: 2 | Status: SHIPPED | OUTPATIENT
Start: 2025-01-02

## 2025-01-02 RX ORDER — DEXAMETHASONE 4 MG/1
4 TABLET ORAL 2 TIMES DAILY WITH MEALS
Qty: 6 TABLET | Refills: 3 | Status: SHIPPED | OUTPATIENT
Start: 2025-01-08

## 2025-01-02 RX ORDER — FOLIC ACID 1 MG/1
1000 TABLET ORAL DAILY
Qty: 30 TABLET | Refills: 4 | Status: SHIPPED | OUTPATIENT
Start: 2025-01-02

## 2025-01-02 RX ORDER — PROCHLORPERAZINE MALEATE 10 MG
10 TABLET ORAL EVERY 6 HOURS PRN
Qty: 30 TABLET | Refills: 2 | Status: SHIPPED | OUTPATIENT
Start: 2025-01-02

## 2025-01-02 NOTE — PROGRESS NOTES
"[facility]: Cancer Care Initial Note                                    Discussion with Patient:                                                      Called patient and had chemo teach with him and his wife while they were in car.     Antiemetics: Discussed how to use in detail.   Aleyda says their son had a severe reaction to compazine in the past: \"lock jaw\", had to go to E.D.   Steroid use/side effect: Discussed schedule       Goals          General     Medical (pt-stated)      Notes - Note created  12/10/2024  1:11 PM by Apryl Bailon RN     Receive treatment for lung cancer with minimal side effects.               Assessment:                                                      Initial  Current living arrangement:: I live in a private home with spouse  Type of residence:: Private home - stairs  Informal Support system:: Family, Spouse, Neighbors  Equipment Currently Used at Home: none  Bed or wheelchair confined:: No  Mobility Status: Independent  Transportation means:: Regular car  Medication adherence problem (GOAL):: No  Knowledgeable about how to use meds:: Yes  Medication side effects suspected:: No    Plan of Care Education Review:   Assessment completed with:: Patient, Spouse or significant other    Plan of Care Education   Diagnosis:: lung cancer, s/p radiation 5x doses, mets  Does patient understand diagnosis?: Yes  Does patient understand treatment plan/regimen?: Yes  Vascular access education provided for:: Port  Side effect education:: Diarrhea/Constipation, Mouth sores, Nausea/Vomiting, Lab value monitoring (anemia, neutropenia, thrombocytopenia), Fatigue, Hair loss, Infection, Neuropathy, Mylosuppression  Supportive services education provided for:: Social work, Oncology behavioral health, Nutrition  Safety/self care at home reviewed with patient:: Yes  Coping - concerns/fears reviewed with patient:: Yes  Plan of Care:: WILLIAM follow-up appointment, MD follow-up appointment, Lab " appointment, Treatment schedule, Imaging  When to call provider:: Bleeding, Increased shortness of breath, New/worsening pain, Shaking chills, Temperature >100.4F, Uncontrolled diarrhea/constipation, Uncontrolled nausea/vomiting  Reasons for deferring treatment reviewed with patient:: Yes  Procedure education provided for: : Port/PICC placement    Evaluation of Learning  Patient Education Provided: Yes  Readiness:: Eager, Acceptance  Method:: Booklet/Handout, Explanation  Response:: Verbalizes understanding           Intervention/Education provided during outreach:                                                       Discussed/taught as noted above.     Offered EMLA cream, and he declined. Let him know he can ask for it later if he wants.  >> Will discuss with Dr. Lux these items:   >> Folate and decadron were not released in time to follow the instructions of taking them prior to chemo D1.   >> Pt is having a very difficult time sleeping due to anxiety. Oncall doc last week was not comfortable with ordering a sleeping medication. He is currently taking 3 Tylenol PM s at night.  He will discuss this with Dr. Lux tomorrow, but writer will notify Dr. Lux today.     Told them I will ask for a mid cycle check appt to be made.     They would like a printed list of appts, and of medications. (After Visit Summary)    Patient to follow up as scheduled at next appt  Patient to call/mobiDEOSt message with updates  Confirmed patient has clinic and triage numbers    Signature:  Apryl Bailon RN

## 2025-01-07 ENCOUNTER — TELEPHONE (OUTPATIENT)
Dept: ONCOLOGY | Facility: HOSPITAL | Age: 65
End: 2025-01-07

## 2025-01-07 ENCOUNTER — VIRTUAL VISIT (OUTPATIENT)
Dept: ONCOLOGY | Facility: HOSPITAL | Age: 65
End: 2025-01-07
Attending: PSYCHOLOGIST
Payer: MEDICARE

## 2025-01-07 VITALS — WEIGHT: 220 LBS | BODY MASS INDEX: 30.8 KG/M2 | HEIGHT: 71 IN

## 2025-01-07 DIAGNOSIS — F43.21 ADJUSTMENT DISORDER WITH DEPRESSED MOOD: Primary | ICD-10-CM

## 2025-01-07 PROCEDURE — 90837 PSYTX W PT 60 MINUTES: CPT | Mod: 95 | Performed by: PSYCHOLOGIST
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
## 2025-01-07 NOTE — NURSING NOTE
Current patient location:  Pt is up [facility]  area     Is the patient currently in the state of [place]? YES    Visit mode:VIDEO    If the visit is dropped, the patient can be reconnected by:TELEPHONE VISIT: Phone number: [phone]    Will anyone else be joining the visit? NO  (If patient encounters technical issues they should call [phone]    Are changes needed to the allergy or medication list? No    Are refills needed on medications prescribed by this physician? NO    Rooming Documentation:  Questionnaire(s) completed    Reason for visit: RECHECK    Kalpana FOX

## 2025-01-07 NOTE — TELEPHONE ENCOUNTER
Walt called this morning noting some symptoms he's been having after his 1st chemo infusion on Friday. He states that today he's mildy lightheaded, worse with position changes. He states he is drinking 3.5L of water per day. Denies issues with illness,fever, SOB, or chest pain. He states he did 1 loose stool but this has resolved. I instructed the pt to monitor symptoms, call back if any worsening symptoms occur and to seek out ER care in case of an emergency.   Felipe Matthew RN 01/07/25  11:16 AM         2020

## 2025-01-07 NOTE — PROGRESS NOTES
Virtual Visit Details    Type of service:  Video Visit   Video Start Time:  1000  Video End Time: 1053    Originating Location (pt. Location): Home    Distant Location (provider location):  On-site  Platform used for Video Visit: [employer]

## 2025-01-09 DIAGNOSIS — C34.11 MALIGNANT NEOPLASM OF UPPER LOBE OF RIGHT LUNG (H): Primary | ICD-10-CM

## 2025-01-09 DIAGNOSIS — C34.91 NON-SMALL CELL CARCINOMA OF LUNG, RIGHT (H): ICD-10-CM

## 2025-01-09 DIAGNOSIS — C79.51 MALIGNANT NEOPLASM METASTATIC TO BONE (H): ICD-10-CM

## 2025-01-09 DIAGNOSIS — C79.89 SECONDARY MALIGNANT NEOPLASM OF OTHER SPECIFIED SITES (H): ICD-10-CM

## 2025-01-14 ENCOUNTER — VIRTUAL VISIT (OUTPATIENT)
Dept: ONCOLOGY | Facility: HOSPITAL | Age: 65
End: 2025-01-14
Attending: PSYCHOLOGIST
Payer: MEDICARE

## 2025-01-14 VITALS — WEIGHT: 220 LBS | BODY MASS INDEX: 30.8 KG/M2 | HEIGHT: 71 IN

## 2025-01-14 DIAGNOSIS — F10.20 ALCOHOL DEPENDENCE, EPISODIC DRINKING BEHAVIOR (H): ICD-10-CM

## 2025-01-14 DIAGNOSIS — F43.21 ADJUSTMENT DISORDER WITH DEPRESSED MOOD: Primary | ICD-10-CM

## 2025-01-14 PROCEDURE — 90837 PSYTX W PT 60 MINUTES: CPT | Mod: 95 | Performed by: PSYCHOLOGIST

## 2025-01-14 ASSESSMENT — PAIN SCALES - GENERAL: PAINLEVEL_OUTOF10: NO PAIN (0)

## 2025-01-14 NOTE — PROGRESS NOTES
Virtual Visit Details    Type of service:  Video Visit   Video Start Time:  1100  Video End Time: 1153    Originating Location (pt. Location): Home    Distant Location (provider location):  On-site  Platform used for Video Visit: [employer]
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
## 2025-01-14 NOTE — NURSING NOTE
Current patient location: [address] [place] [address] [place]  [facility] [id number]    Is the patient currently in the state of [place]? YES    Visit mode: VIDEO    If the visit is dropped, the patient can be reconnected by:VIDEO VISIT: Text to cell phone:   Telephone Information:   Mobile [phone]   Mobile Not on file.       Will anyone else be joining the visit? NO  (If patient encounters technical issues they should call [phone]    Are changes needed to the allergy or medication list? No    Are refills needed on medications prescribed by this physician? NO    Rooming Documentation:  Not applicable    Reason for visit: RECHECK    Bobbilynn Grossaint VVF

## 2025-01-15 ENCOUNTER — PATIENT OUTREACH (OUTPATIENT)
Dept: ONCOLOGY | Facility: HOSPITAL | Age: 65
End: 2025-01-15
Payer: MEDICARE

## 2025-01-15 DIAGNOSIS — G47.09 OTHER INSOMNIA: ICD-10-CM

## 2025-01-15 DIAGNOSIS — C34.91 NON-SMALL CELL CARCINOMA OF LUNG, RIGHT (H): Primary | ICD-10-CM

## 2025-01-15 RX ORDER — TRAZODONE HYDROCHLORIDE 100 MG/1
100 TABLET ORAL AT BEDTIME
Qty: 30 TABLET | Refills: 2 | Status: SHIPPED | OUTPATIENT
Start: 2025-01-15

## 2025-01-16 ENCOUNTER — INFUSION THERAPY VISIT (OUTPATIENT)
Dept: INFUSION THERAPY | Facility: HOSPITAL | Age: 65
End: 2025-01-16
Attending: INTERNAL MEDICINE
Payer: MEDICARE

## 2025-01-16 ENCOUNTER — VIRTUAL VISIT (OUTPATIENT)
Dept: ONCOLOGY | Facility: HOSPITAL | Age: 65
End: 2025-01-16
Attending: INTERNAL MEDICINE
Payer: MEDICARE

## 2025-01-16 VITALS
HEIGHT: 71 IN | TEMPERATURE: 98.6 F | OXYGEN SATURATION: 97 % | RESPIRATION RATE: 18 BRPM | SYSTOLIC BLOOD PRESSURE: 129 MMHG | DIASTOLIC BLOOD PRESSURE: 77 MMHG | HEART RATE: 88 BPM | WEIGHT: 225.3 LBS | BODY MASS INDEX: 31.54 KG/M2

## 2025-01-16 DIAGNOSIS — C34.91 NON-SMALL CELL CARCINOMA OF LUNG, RIGHT (H): Primary | ICD-10-CM

## 2025-01-16 DIAGNOSIS — C79.51 MALIGNANT NEOPLASM METASTATIC TO BONE (H): ICD-10-CM

## 2025-01-16 DIAGNOSIS — C79.89 SECONDARY MALIGNANT NEOPLASM OF OTHER SPECIFIED SITES (H): ICD-10-CM

## 2025-01-16 DIAGNOSIS — C34.91 NON-SMALL CELL CARCINOMA OF LUNG, RIGHT (H): ICD-10-CM

## 2025-01-16 DIAGNOSIS — C34.11 MALIGNANT NEOPLASM OF UPPER LOBE OF RIGHT LUNG (H): ICD-10-CM

## 2025-01-16 LAB
ALBUMIN SERPL BCG-MCNC: 4.3 G/DL (ref 3.5–5.2)
ALP SERPL-CCNC: 96 U/L (ref 40–150)
ALT SERPL W P-5'-P-CCNC: 19 U/L (ref 0–70)
ANION GAP SERPL CALCULATED.3IONS-SCNC: 10 MMOL/L (ref 7–15)
AST SERPL W P-5'-P-CCNC: 19 U/L (ref 0–45)
BASOPHILS # BLD AUTO: 0 10E3/UL (ref 0–0.2)
BASOPHILS NFR BLD AUTO: 1 %
BILIRUB SERPL-MCNC: 0.6 MG/DL
BUN SERPL-MCNC: 15.3 MG/DL (ref 8–23)
CALCIUM SERPL-MCNC: 10 MG/DL (ref 8.8–10.4)
CHLORIDE SERPL-SCNC: 103 MMOL/L (ref 98–107)
CREAT SERPL-MCNC: 0.84 MG/DL (ref 0.67–1.17)
EGFRCR SERPLBLD CKD-EPI 2021: >90 ML/MIN/1.73M2
EOSINOPHIL # BLD AUTO: 0.1 10E3/UL (ref 0–0.7)
EOSINOPHIL NFR BLD AUTO: 3 %
ERYTHROCYTE [DISTWIDTH] IN BLOOD BY AUTOMATED COUNT: 12.4 % (ref 10–15)
GLUCOSE SERPL-MCNC: 107 MG/DL (ref 70–99)
HCO3 SERPL-SCNC: 28 MMOL/L (ref 22–29)
HCT VFR BLD AUTO: 36 % (ref 40–53)
HGB BLD-MCNC: 12.8 G/DL (ref 13.3–17.7)
IMM GRANULOCYTES # BLD: 0 10E3/UL
IMM GRANULOCYTES NFR BLD: 0 %
LYMPHOCYTES # BLD AUTO: 0.9 10E3/UL (ref 0.8–5.3)
LYMPHOCYTES NFR BLD AUTO: 24 %
MCH RBC QN AUTO: 32 PG (ref 26.5–33)
MCHC RBC AUTO-ENTMCNC: 35.6 G/DL (ref 31.5–36.5)
MCV RBC AUTO: 90 FL (ref 78–100)
MONOCYTES # BLD AUTO: 0.4 10E3/UL (ref 0–1.3)
MONOCYTES NFR BLD AUTO: 11 %
NEUTROPHILS # BLD AUTO: 2.4 10E3/UL (ref 1.6–8.3)
NEUTROPHILS NFR BLD AUTO: 61 %
NRBC # BLD AUTO: 0 10E3/UL
NRBC BLD AUTO-RTO: 0 /100
PLATELET # BLD AUTO: 154 10E3/UL (ref 150–450)
POTASSIUM SERPL-SCNC: 3.7 MMOL/L (ref 3.4–5.3)
PROT SERPL-MCNC: 6.9 G/DL (ref 6.4–8.3)
RBC # BLD AUTO: 4 10E6/UL (ref 4.4–5.9)
SODIUM SERPL-SCNC: 141 MMOL/L (ref 135–145)
TSH SERPL DL<=0.005 MIU/L-ACNC: 1.37 UIU/ML (ref 0.3–4.2)
WBC # BLD AUTO: 3.9 10E3/UL (ref 4–11)

## 2025-01-16 PROCEDURE — 82040 ASSAY OF SERUM ALBUMIN: CPT

## 2025-01-16 PROCEDURE — 85025 COMPLETE CBC W/AUTO DIFF WBC: CPT

## 2025-01-16 PROCEDURE — 36591 DRAW BLOOD OFF VENOUS DEVICE: CPT

## 2025-01-16 PROCEDURE — 82947 ASSAY GLUCOSE BLOOD QUANT: CPT

## 2025-01-16 PROCEDURE — 84443 ASSAY THYROID STIM HORMONE: CPT

## 2025-01-16 PROCEDURE — 80053 COMPREHEN METABOLIC PANEL: CPT

## 2025-01-16 ASSESSMENT — PAIN SCALES - GENERAL: PAINLEVEL_OUTOF10: NO PAIN (0)

## 2025-01-16 NOTE — PROGRESS NOTES
[facility] Hematology and Oncology Progress Note    Patient: Walt Estrada  MRN: [id number]  Date of Service: Jan 16, 2025          Reason for Visit    Chief Complaint   Patient presents with    Oncology Clinic Visit     Malignant neoplasm metastatic to bone (H)  Malignant neoplasm of upper lobe of right lung (H)         Assessment and Plan     Cancer Staging   Malignant neoplasm of upper lobe of right lung (H)  Staging form: Lung, AJCC 8th Edition  - Clinical: Stage IV (cTX, cNX, cM1) - Signed by Elizabeth Lux MD on 11/29/2024    Lung cancer, adenocarcinoma: pt has started palliative chemo with Carboplatin, alimta, keytruda. Tolerated the first cycle really well.  He is due to return next week for his second cycle.  We will get a CT scan after that cycle.  He will be seen by Dr. Lux around February 14 for cycle 3.  The overall plan will be to give 4 cycles of the triplet therapy, his fourth cycle will be March 7.  He will then likely go on to do maintenance Keytruda.  Patient is planning on going to [place] since he has at home there after the fourth dose on March 7 and will likely get set up with an oncologist down there to continue his treatment while he is there until roughly May.     Leukopenia: chemo induced. Above limits to treat. No changes at this time. Encourage her to call us with infectious complaints.     Anemia: chemo induced and usually cumulative. Overall asymptomatic except fatigue. Continue to monitor.     Insomnia: has started trazodone and that is working very well. He is very happy that he is sleeping better since he has had issues with that for many years. No change for now.     ECOG Performance    0 - Independent    Distress Screening (within last 30 days)    1. How concerned are you about your ability to eat? : 0  2. How concerned are you about unintended weight loss or your current weight? : 0  3. How concerned are you about feeling depressed or very sad? : 0  4. How  concerned are you about feeling anxious or very scared? : 0  5. Do you struggle with the loss of meaning and travon in your life? : Not at all  6. How concerned are you about work and home life issues that may be affected by your cancer? : 0  7. How concerned are you about knowing what resources are available to help you? : 0  8. Do you currently have what you would describe as Tenriism or spiritual struggles?: Not at all       Pain  Pain Score: No Pain (0)    Problem List    Patient Active Problem List   Diagnosis    Malignant neoplasm metastatic to bone (H)    Malignant neoplasm of upper lobe of right lung (H)        ______________________________________________________________________________    History of Present Illness    Oncologist: Dr. Lux    Diagnosis: Lung cancer, adenocarcinoma. Stage IV with solitary lung met and sternal met.     Treatment:   -12/6/24-12/16/24: Initially had radiation to right lung lesions and sternum  -1/3/25: Carboplatin, Alimta, Keytruda.     Interim History:   Pt started chemo last week. Here today for toxicity check.  Overall he states he is quite happy with how he is doing and is actually feeling pretty great.  He says that he started taking trazodone to sleep and that has worked wonders.  He says he has not slept this good in probably 20 years.  He also is noticing some dizziness so it was felt that his blood pressure was little bit low so he stopped taking his lisinopril and his blood pressure has been really good and the dizziness is gone away so he is happy about that.  He is eating and drinking really well.  He has some questions about dates of treatment because he is planning on going down to [place].  He has a house down there and would like to spend time down there this winter/spring.  Other than that he denies any infectious complaints.  Has him questions about precautions he would need to take when he is flying to [place].  Denies any new bone or back pain issues.   "Denies any breathing issues.      Review of Systems    Pertinent items are noted in HPI.    Past History    Past Medical History:   Diagnosis Date    BCC (basal cell carcinoma), face     Dyslipidemia     Essential hypertension     GERD (gastroesophageal reflux disease)        PHYSICAL EXAM  /77   Pulse 88   Temp 98.6  F (37  C)   Resp 18   Ht 1.803 m (5' 11\")   Wt 102.2 kg (225 lb 4.8 oz)   SpO2 97%   BMI 31.42 kg/m      GENERAL: no acute distress. Cooperative in conversation. Wife on video with pt.   RESP: Regular respiratory rate. No expiratory wheezes   NEURO: non focal. Alert and oriented x3.   PSYCH: within normal limits. No depression or anxiety.  SKIN: exposed skin is dry intact.     Lab Results    Recent Results (from the past week)   Comprehensive metabolic panel (BMP + Alb, Alk Phos, ALT, AST, Total. Bili, TP)   Result Value Ref Range    Sodium 141 135 - 145 mmol/L    Potassium 3.7 3.4 - 5.3 mmol/L    Carbon Dioxide (CO2) 28 22 - 29 mmol/L    Anion Gap 10 7 - 15 mmol/L    Urea Nitrogen 15.3 8.0 - 23.0 mg/dL    Creatinine 0.84 0.67 - 1.17 mg/dL    GFR Estimate >90 >60 mL/min/1.73m2    Calcium 10.0 8.8 - 10.4 mg/dL    Chloride 103 98 - 107 mmol/L    Glucose 107 (H) 70 - 99 mg/dL    Alkaline Phosphatase 96 40 - 150 U/L    AST 19 0 - 45 U/L    ALT 19 0 - 70 U/L    Protein Total 6.9 6.4 - 8.3 g/dL    Albumin 4.3 3.5 - 5.2 g/dL    Bilirubin Total 0.6 <=1.2 mg/dL   TSH with free T4 reflex   Result Value Ref Range    TSH 1.37 0.30 - 4.20 uIU/mL   CBC with platelets and differential   Result Value Ref Range    WBC Count 3.9 (L) 4.0 - 11.0 10e3/uL    RBC Count 4.00 (L) 4.40 - 5.90 10e6/uL    Hemoglobin 12.8 (L) 13.3 - 17.7 g/dL    Hematocrit 36.0 (L) 40.0 - 53.0 %    MCV 90 78 - 100 fL    MCH 32.0 26.5 - 33.0 pg    MCHC 35.6 31.5 - 36.5 g/dL    RDW 12.4 10.0 - 15.0 %    Platelet Count 154 150 - 450 10e3/uL    % Neutrophils 61 %    % Lymphocytes 24 %    % Monocytes 11 %    % Eosinophils 3 %    % " Basophils 1 %    % Immature Granulocytes 0 %    NRBCs per 100 WBC 0 <1 /100    Absolute Neutrophils 2.4 1.6 - 8.3 10e3/uL    Absolute Lymphocytes 0.9 0.8 - 5.3 10e3/uL    Absolute Monocytes 0.4 0.0 - 1.3 10e3/uL    Absolute Eosinophils 0.1 0.0 - 0.7 10e3/uL    Absolute Basophils 0.0 0.0 - 0.2 10e3/uL    Absolute Immature Granulocytes 0.0 <=0.4 10e3/uL    Absolute NRBCs 0.0 10e3/uL       Imaging    IR Chest Port Placement > 5 Yrs of Age    Result Date: 12/30/2024  [facility] LOCATION: [facility] DATE: 12/30/2024 PROCEDURE: IMPLANTABLE VENOUS PORT PLACEMENT: 1. ULTRASOUND GUIDANCE FOR VASCULAR ACCESS. A PERMANENT IMAGE WAS STORED. 2. IMPLANTABLE VENOUS ACCESS PORT PLACEMENT. 3. FLUOROSCOPIC GUIDANCE FOR CENTRAL VENOUS ACCESS DEVICE PLACEMENT. INTERVENTIONAL RADIOLOGIST: Buzz Giles MD. INDICATION: lung cancer with plans for chemotherapy. CONSENT: The risks, benefits and alternatives of port placement were discussed with the patient  in detail. All questions were answered. Informed consent was given to proceed with the procedure. MODERATE SEDATION: Versed 2.5 mg IV; Fentanyl 100 mcg IV.  Under physician supervision, Versed and fentanyl were administered for moderate sedation. Pulse oximetry, heart rate and blood pressure were continuously monitored by an independent trained observer. The physician spent 15 minutes of face-to-face sedation time with the patient. During the timeout, immediately prior to administration of medications, the patient was reassessed for adequacy to receive conscious sedation. CONTRAST: None ANTIBIOTICS: 2 g of Ancef IV ADDITIONAL MEDICATIONS: None. FLUOROSCOPIC TIME: 0.6 minutes. RADIATION DOSE: Air Kerma: 14 mGy. COMPLICATIONS: No immediate complications. STERILE BARRIER TECHNIQUE: Maximum sterile barrier technique was used. Cutaneous antisepsis was performed at the operative site with application of 2% chlorhexidine and large sterile drape. Prior to the  procedure, the  and assistant performed hand hygiene and wore hat, mask, sterile gown, and sterile gloves during the entire procedure. PROCEDURE: Ultrasound demonstrated a patent and compressible right internal  jugular vein, and an ultrasound image was obtained and placed in the patient's permanent medical record. The right neck and chest were prepped and draped in usual sterile fashion. Using real-time ultrasound guidance, the right internal jugular vein was accessed. A subcutaneous pocket was created and irrigated with sterile normal saline. The catheter was tunneled in an antegrade fashion. Over the guidewire, a peel-away sheath was advanced with fluoroscopic monitoring. Through the peel-away sheath, the catheter was advanced until the tip was at the cavoatrial junction. The catheter was cut to length and attached firmly to the port. The port was anchored with 2-0 Prolene in the subcutaneous pocket.  The incisions were closed with layered absorbable suture and surgical glue. FINDINGS: Ultrasound shows an anechoic and compressible jugular vein. At the completion of the study, the port tip lies at the cavoatrial junction. The central venous catheter insertion checklist was reviewed prior to placement and followed throughout the procedure. TYPE OF PORT:  SmartPort     IMPRESSION: Successful power-injectable venous port placement. CPT codes for physician reference only: 80573 [id number]     The longitudinal plan of care for the diagnosis(es)/condition(s) as documented were addressed during this visit. Due to the added complexity in care, I will continue to support Walt in the subsequent management and with ongoing continuity of care.  Total time spent with patient in face to face time, chart review and documentation was 35 minutes.      Virtual Visit Details    Type of service:  Video Visit   Video Start Time:  10:36am  Video End Time: 10:54am    Originating Location (pt. Location): Other Cancer clinic,  [place]    Distant Location (provider location):  Off-site  Platform used for Video Visit: Other: Clinic iPad          Signed by: MATEO Trotter CNP

## 2025-01-16 NOTE — PROGRESS NOTES
"Oncology Rooming Note    January 16, 2025 10:27 AM   Walt Estrada is a 64 year old male who presents for:    Chief Complaint   Patient presents with    Oncology Clinic Visit     Malignant neoplasm metastatic to bone (H)  Malignant neoplasm of upper lobe of right lung (H)       Initial Vitals: /77   Pulse 88   Temp 98.6  F (37  C)   Resp 18   Ht 1.803 m (5' 11\")   Wt 102.2 kg (225 lb 4.8 oz)   SpO2 97%   BMI 31.42 kg/m   Estimated body mass index is 31.42 kg/m  as calculated from the following:    Height as of this encounter: 1.803 m (5' 11\").    Weight as of this encounter: 102.2 kg (225 lb 4.8 oz). Body surface area is 2.26 meters squared.  No Pain (0) Comment: Data Unavailable   No LMP for male patient.  Allergies reviewed: Yes  Medications reviewed: Yes    Medications: Medication refills not needed today.  Pharmacy name entered into EPIC:    [facility] - [facility], [place] - [address] [place] [address] PHARMACY [place], [place] - [address] [id number] [address] AT [facility] OF [address] [place] [id number] [place] [address] PHARMACY [place] - [facility] [address] [facility]    Frailty Screening:   Is the patient here for a new oncology consult visit in cancer care? 2. No      Clinical concerns: None      Jennifer Larsen LPN             "

## 2025-01-24 ENCOUNTER — ONCOLOGY VISIT (OUTPATIENT)
Dept: ONCOLOGY | Facility: HOSPITAL | Age: 65
End: 2025-01-24
Attending: INTERNAL MEDICINE
Payer: MEDICARE

## 2025-01-24 VITALS
TEMPERATURE: 98.2 F | WEIGHT: 228.4 LBS | OXYGEN SATURATION: 96 % | BODY MASS INDEX: 31.98 KG/M2 | HEART RATE: 75 BPM | SYSTOLIC BLOOD PRESSURE: 149 MMHG | DIASTOLIC BLOOD PRESSURE: 94 MMHG | RESPIRATION RATE: 18 BRPM | HEIGHT: 71 IN

## 2025-01-24 RX ORDER — ALBUTEROL SULFATE 0.83 MG/ML
2.5 SOLUTION RESPIRATORY (INHALATION)
Status: CANCELLED | OUTPATIENT
Start: 2025-01-24

## 2025-01-24 RX ORDER — EPINEPHRINE 1 MG/ML
0.3 INJECTION, SOLUTION INTRAMUSCULAR; SUBCUTANEOUS EVERY 5 MIN PRN
Status: CANCELLED | OUTPATIENT
Start: 2025-01-24

## 2025-01-24 RX ORDER — CYANOCOBALAMIN 1000 UG/ML
1000 INJECTION, SOLUTION INTRAMUSCULAR; SUBCUTANEOUS
Status: CANCELLED | OUTPATIENT
Start: 2025-01-24

## 2025-01-24 RX ORDER — METHYLPREDNISOLONE SODIUM SUCCINATE 40 MG/ML
40 INJECTION INTRAMUSCULAR; INTRAVENOUS
Status: CANCELLED
Start: 2025-01-24

## 2025-01-24 RX ORDER — HEPARIN SODIUM,PORCINE 10 UNIT/ML
5-20 VIAL (ML) INTRAVENOUS DAILY PRN
Status: CANCELLED | OUTPATIENT
Start: 2025-01-24

## 2025-01-24 RX ORDER — PALONOSETRON 0.05 MG/ML
0.25 INJECTION, SOLUTION INTRAVENOUS ONCE
Status: CANCELLED
Start: 2025-01-24

## 2025-01-24 RX ORDER — ALBUTEROL SULFATE 90 UG/1
1-2 INHALANT RESPIRATORY (INHALATION)
Status: CANCELLED
Start: 2025-01-24

## 2025-01-24 RX ORDER — HEPARIN SODIUM (PORCINE) LOCK FLUSH IV SOLN 100 UNIT/ML 100 UNIT/ML
5 SOLUTION INTRAVENOUS
Status: CANCELLED | OUTPATIENT
Start: 2025-01-24

## 2025-01-24 RX ORDER — DIPHENHYDRAMINE HYDROCHLORIDE 50 MG/ML
50 INJECTION INTRAMUSCULAR; INTRAVENOUS
Status: CANCELLED
Start: 2025-01-24

## 2025-01-24 RX ORDER — MEPERIDINE HYDROCHLORIDE 25 MG/ML
25 INJECTION INTRAMUSCULAR; INTRAVENOUS; SUBCUTANEOUS
Status: CANCELLED | OUTPATIENT
Start: 2025-01-24

## 2025-01-24 RX ORDER — LORAZEPAM 2 MG/ML
0.5 INJECTION INTRAMUSCULAR EVERY 4 HOURS PRN
Status: CANCELLED | OUTPATIENT
Start: 2025-01-24

## 2025-01-24 RX ORDER — DIPHENHYDRAMINE HYDROCHLORIDE 50 MG/ML
25 INJECTION INTRAMUSCULAR; INTRAVENOUS
Status: CANCELLED
Start: 2025-01-24

## 2025-01-24 ASSESSMENT — PAIN SCALES - GENERAL: PAINLEVEL_OUTOF10: NO PAIN (0)

## 2025-01-24 NOTE — PROGRESS NOTES
[facility] Hematology and Oncology Progress Note    Patient: Walt Estrada  MRN: [id number]  Date of Service: Jan 24, 2025          Reason for Visit    No chief complaint on file.      Assessment and Plan     Cancer Staging   Malignant neoplasm of upper lobe of right lung (H)  Staging form: Lung, AJCC 8th Edition  - Clinical: Stage IV (cTX, cNX, cM1) - Signed by Elizabeth Lux MD on 11/29/2024    Lung cancer, adenocarcinoma: pt has started palliative chemo with Carboplatin, alimta, keytruda. Tolerated the first cycle really well.  He is due to return next week for his second cycle.  We will get a CT scan after that cycle.  He will be seen by Dr. Lux around February 14 for cycle 3.  The overall plan will be to give 4 cycles of the triplet therapy, his fourth cycle will be March 7.  He will then likely go on to do maintenance Keytruda.  Patient is planning on going to [place] since he has at home there after the fourth dose on March 7 and will likely get set up with an oncologist down there to continue his treatment while he is there until roughly May.     Leukopenia: chemo induced. Above limits to treat. No changes at this time. Encourage her to call us with infectious complaints.     Anemia: chemo induced and usually cumulative. Overall asymptomatic except fatigue. Continue to monitor.     Insomnia: has started trazodone and that is working very well. He is very happy that he is sleeping better since he has had issues with that for many years. No change for now.     ECOG Performance    0 - Independent    Distress Screening (within last 30 days)    1. How concerned are you about your ability to eat? : 0  2. How concerned are you about unintended weight loss or your current weight? : 0  3. How concerned are you about feeling depressed or very sad? : 0  4. How concerned are you about feeling anxious or very scared? : 0  5. Do you struggle with the loss of meaning and travon in your life? : Not at all  6.  How concerned are you about work and home life issues that may be affected by your cancer? : 0  7. How concerned are you about knowing what resources are available to help you? : 0  8. Do you currently have what you would describe as Gnosticist or spiritual struggles?: Not at all       Pain       Problem List    Patient Active Problem List   Diagnosis    Malignant neoplasm metastatic to bone (H)    Malignant neoplasm of upper lobe of right lung (H)        ______________________________________________________________________________    History of Present Illness    Oncologist: Dr. Lux    Diagnosis: Lung cancer, adenocarcinoma. Stage IV with solitary lung met and sternal met.     Treatment:   -12/6/24-12/16/24: Initially had radiation to right lung lesions and sternum  -1/3/25: Carboplatin, Alimta, Keytruda.     Interim History:   Pt started chemo last week. Here today for toxicity check.  Overall he states he is quite happy with how he is doing and is actually feeling pretty great.  He says that he started taking trazodone to sleep and that has worked wonders.  He says he has not slept this good in probably 20 years.  He also is noticing some dizziness so it was felt that his blood pressure was little bit low so he stopped taking his lisinopril and his blood pressure has been really good and the dizziness is gone away so he is happy about that.  He is eating and drinking really well.  He has some questions about dates of treatment because he is planning on going down to [place].  He has a house down there and would like to spend time down there this winter/spring.  Other than that he denies any infectious complaints.  Has him questions about precautions he would need to take when he is flying to [place].  Denies any new bone or back pain issues.  Denies any breathing issues.      Review of Systems    Pertinent items are noted in HPI.    Past History    Past Medical History:   Diagnosis Date    BCC (basal cell  carcinoma), face     Dyslipidemia     Essential hypertension     GERD (gastroesophageal reflux disease)        PHYSICAL EXAM  There were no vitals taken for this visit.    GENERAL: no acute distress. Cooperative in conversation. Wife on video with pt.   RESP: Regular respiratory rate. No expiratory wheezes   NEURO: non focal. Alert and oriented x3.   PSYCH: within normal limits. No depression or anxiety.  SKIN: exposed skin is dry intact.     Lab Results    No results found for this or any previous visit (from the past week).      Imaging    IR Chest Port Placement > 5 Yrs of Age    Result Date: 12/30/2024  [facility] LOCATION: [facility] DATE: 12/30/2024 PROCEDURE: IMPLANTABLE VENOUS PORT PLACEMENT: 1. ULTRASOUND GUIDANCE FOR VASCULAR ACCESS. A PERMANENT IMAGE WAS STORED. 2. IMPLANTABLE VENOUS ACCESS PORT PLACEMENT. 3. FLUOROSCOPIC GUIDANCE FOR CENTRAL VENOUS ACCESS DEVICE PLACEMENT. INTERVENTIONAL RADIOLOGIST: Buzz Giles MD. INDICATION: lung cancer with plans for chemotherapy. CONSENT: The risks, benefits and alternatives of port placement were discussed with the patient  in detail. All questions were answered. Informed consent was given to proceed with the procedure. MODERATE SEDATION: Versed 2.5 mg IV; Fentanyl 100 mcg IV.  Under physician supervision, Versed and fentanyl were administered for moderate sedation. Pulse oximetry, heart rate and blood pressure were continuously monitored by an independent trained observer. The physician spent 15 minutes of face-to-face sedation time with the patient. During the timeout, immediately prior to administration of medications, the patient was reassessed for adequacy to receive conscious sedation. CONTRAST: None ANTIBIOTICS: 2 g of Ancef IV ADDITIONAL MEDICATIONS: None. FLUOROSCOPIC TIME: 0.6 minutes. RADIATION DOSE: Air Kerma: 14 mGy. COMPLICATIONS: No immediate complications. STERILE BARRIER TECHNIQUE: Maximum sterile barrier technique  was used. Cutaneous antisepsis was performed at the operative site with application of 2% chlorhexidine and large sterile drape. Prior to the procedure, the  and assistant performed hand hygiene and wore hat, mask, sterile gown, and sterile gloves during the entire procedure. PROCEDURE: Ultrasound demonstrated a patent and compressible right internal  jugular vein, and an ultrasound image was obtained and placed in the patient's permanent medical record. The right neck and chest were prepped and draped in usual sterile fashion. Using real-time ultrasound guidance, the right internal jugular vein was accessed. A subcutaneous pocket was created and irrigated with sterile normal saline. The catheter was tunneled in an antegrade fashion. Over the guidewire, a peel-away sheath was advanced with fluoroscopic monitoring. Through the peel-away sheath, the catheter was advanced until the tip was at the cavoatrial junction. The catheter was cut to length and attached firmly to the port. The port was anchored with 2-0 Prolene in the subcutaneous pocket.  The incisions were closed with layered absorbable suture and surgical glue. FINDINGS: Ultrasound shows an anechoic and compressible jugular vein. At the completion of the study, the port tip lies at the cavoatrial junction. The central venous catheter insertion checklist was reviewed prior to placement and followed throughout the procedure. TYPE OF PORT:  SmartPort     IMPRESSION: Successful power-injectable venous port placement. CPT codes for physician reference only: [id number]     The longitudinal plan of care for the diagnosis(es)/condition(s) as documented were addressed during this visit. Due to the added complexity in care, I will continue to support Walt in the subsequent management and with ongoing continuity of care.  Total time spent with patient in face to face time, chart review and documentation was 35 minutes.        Signed by: Elizabeth  MD Jose J

## 2025-01-24 NOTE — Clinical Note
1/24/2025      Walt Estrada  [address] [place] [address]      Dear Colleague,    Thank you for referring your patient, Walt Estrada, to the [facility] [place]. Please see a copy of my visit note below.    [facility] Hematology and Oncology Progress Note    Patient: Walt Estrada  MRN: [id number]  Date of Service: Jan 24, 2025          Reason for Visit    No chief complaint on file.      Assessment and Plan     Cancer Staging   Malignant neoplasm of upper lobe of right lung (H)  Staging form: Lung, AJCC 8th Edition  - Clinical: Stage IV (cTX, cNX, cM1) - Signed by Elizabeth Lux MD on 11/29/2024    Lung cancer, adenocarcinoma: pt has started palliative chemo with Carboplatin, alimta, keytruda. Tolerated the first cycle really well.  He is due to return next week for his second cycle.  We will get a CT scan after that cycle.  He will be seen by Dr. Lux around February 14 for cycle 3.  The overall plan will be to give 4 cycles of the triplet therapy, his fourth cycle will be March 7.  He will then likely go on to do maintenance Keytruda.  Patient is planning on going to [place] since he has at home there after the fourth dose on March 7 and will likely get set up with an oncologist down there to continue his treatment while he is there until roughly May.     Leukopenia: chemo induced. Above limits to treat. No changes at this time. Encourage her to call us with infectious complaints.     Anemia: chemo induced and usually cumulative. Overall asymptomatic except fatigue. Continue to monitor.     Insomnia: has started trazodone and that is working very well. He is very happy that he is sleeping better since he has had issues with that for many years. No change for now.     ECOG Performance    0 - Independent    Distress Screening (within last 30 days)    1. How concerned are you about your ability to eat? : 0  2. How concerned are you about unintended weight loss or your  current weight? : 0  3. How concerned are you about feeling depressed or very sad? : 0  4. How concerned are you about feeling anxious or very scared? : 0  5. Do you struggle with the loss of meaning and travon in your life? : Not at all  6. How concerned are you about work and home life issues that may be affected by your cancer? : 0  7. How concerned are you about knowing what resources are available to help you? : 0  8. Do you currently have what you would describe as Zoroastrian or spiritual struggles?: Not at all       Pain       Problem List    Patient Active Problem List   Diagnosis    Malignant neoplasm metastatic to bone (H)    Malignant neoplasm of upper lobe of right lung (H)        ______________________________________________________________________________    History of Present Illness    Oncologist: Dr. Lux    Diagnosis: Lung cancer, adenocarcinoma. Stage IV with solitary lung met and sternal met.     Treatment:   -12/6/24-12/16/24: Initially had radiation to right lung lesions and sternum  -1/3/25: Carboplatin, Alimta, Keytruda.     Interim History:   Pt started chemo last week. Here today for toxicity check.  Overall he states he is quite happy with how he is doing and is actually feeling pretty great.  He says that he started taking trazodone to sleep and that has worked wonders.  He says he has not slept this good in probably 20 years.  He also is noticing some dizziness so it was felt that his blood pressure was little bit low so he stopped taking his lisinopril and his blood pressure has been really good and the dizziness is gone away so he is happy about that.  He is eating and drinking really well.  He has some questions about dates of treatment because he is planning on going down to [place].  He has a house down there and would like to spend time down there this winter/spring.  Other than that he denies any infectious complaints.  Has him questions about precautions he would need to take  when he is flying to [place].  Denies any new bone or back pain issues.  Denies any breathing issues.      Review of Systems    Pertinent items are noted in HPI.    Past History    Past Medical History:   Diagnosis Date    BCC (basal cell carcinoma), face     Dyslipidemia     Essential hypertension     GERD (gastroesophageal reflux disease)        PHYSICAL EXAM  There were no vitals taken for this visit.    GENERAL: no acute distress. Cooperative in conversation. Wife on video with pt.   RESP: Regular respiratory rate. No expiratory wheezes   NEURO: non focal. Alert and oriented x3.   PSYCH: within normal limits. No depression or anxiety.  SKIN: exposed skin is dry intact.     Lab Results    No results found for this or any previous visit (from the past week).      Imaging    IR Chest Port Placement > 5 Yrs of Age    Result Date: 12/30/2024  [facility] LOCATION: [facility] DATE: 12/30/2024 PROCEDURE: IMPLANTABLE VENOUS PORT PLACEMENT: 1. ULTRASOUND GUIDANCE FOR VASCULAR ACCESS. A PERMANENT IMAGE WAS STORED. 2. IMPLANTABLE VENOUS ACCESS PORT PLACEMENT. 3. FLUOROSCOPIC GUIDANCE FOR CENTRAL VENOUS ACCESS DEVICE PLACEMENT. INTERVENTIONAL RADIOLOGIST: Buzz Giles MD. INDICATION: lung cancer with plans for chemotherapy. CONSENT: The risks, benefits and alternatives of port placement were discussed with the patient  in detail. All questions were answered. Informed consent was given to proceed with the procedure. MODERATE SEDATION: Versed 2.5 mg IV; Fentanyl 100 mcg IV.  Under physician supervision, Versed and fentanyl were administered for moderate sedation. Pulse oximetry, heart rate and blood pressure were continuously monitored by an independent trained observer. The physician spent 15 minutes of face-to-face sedation time with the patient. During the timeout, immediately prior to administration of medications, the patient was reassessed for adequacy to receive conscious sedation.  CONTRAST: None ANTIBIOTICS: 2 g of Ancef IV ADDITIONAL MEDICATIONS: None. FLUOROSCOPIC TIME: 0.6 minutes. RADIATION DOSE: Air Kerma: 14 mGy. COMPLICATIONS: No immediate complications. STERILE BARRIER TECHNIQUE: Maximum sterile barrier technique was used. Cutaneous antisepsis was performed at the operative site with application of 2% chlorhexidine and large sterile drape. Prior to the procedure, the  and assistant performed hand hygiene and wore hat, mask, sterile gown, and sterile gloves during the entire procedure. PROCEDURE: Ultrasound demonstrated a patent and compressible right internal  jugular vein, and an ultrasound image was obtained and placed in the patient's permanent medical record. The right neck and chest were prepped and draped in usual sterile fashion. Using real-time ultrasound guidance, the right internal jugular vein was accessed. A subcutaneous pocket was created and irrigated with sterile normal saline. The catheter was tunneled in an antegrade fashion. Over the guidewire, a peel-away sheath was advanced with fluoroscopic monitoring. Through the peel-away sheath, the catheter was advanced until the tip was at the cavoatrial junction. The catheter was cut to length and attached firmly to the port. The port was anchored with 2-0 Prolene in the subcutaneous pocket.  The incisions were closed with layered absorbable suture and surgical glue. FINDINGS: Ultrasound shows an anechoic and compressible jugular vein. At the completion of the study, the port tip lies at the cavoatrial junction. The central venous catheter insertion checklist was reviewed prior to placement and followed throughout the procedure. TYPE OF PORT:  SmartPort     IMPRESSION: Successful power-injectable venous port placement. CPT codes for physician reference only: 64456 [id number]     The longitudinal plan of care for the diagnosis(es)/condition(s) as documented were addressed during this visit. Due to the added  "complexity in care, I will continue to support Walt in the subsequent management and with ongoing continuity of care.  Total time spent with patient in face to face time, chart review and documentation was 35 minutes.        Signed by: Eilzabeth Lux MD    Oncology Rooming Note    January 24, 2025 11:49 AM   Walt Estrada is a 64 year old male who presents for:    Chief Complaint   Patient presents with    Oncology Clinic Visit     Return visit with labs and infusion      Initial Vitals: BP (!) 149/94 (BP Location: Left arm, Patient Position: Sitting, Cuff Size: Adult Large)   Pulse 75   Temp 98.2  F (36.8  C) (Oral)   Resp 18   Ht 1.803 m (5' 11\")   Wt 103.6 kg (228 lb 6.3 oz)   SpO2 96%   BMI 31.85 kg/m   Estimated body mass index is 31.85 kg/m  as calculated from the following:    Height as of this encounter: 1.803 m (5' 11\").    Weight as of this encounter: 103.6 kg (228 lb 6.3 oz). Body surface area is 2.28 meters squared.  No Pain (0) Comment: Data Unavailable   No LMP for male patient.  Allergies reviewed: Yes  Medications reviewed: Yes    Medications: Medication refills not needed today.  Pharmacy name entered into EPIC:    [facility] - [address] [place]ARMACY [place], [place] - [address] [id number] [address] [facility] AT [address] [address] [place] [address] PHARMACY [place], [place] - [address] [place] [address]    Frailty Screening:   Is the patient here for a new oncology consult visit in cancer care? 2. No      Clinical concerns: discuss BP rising after stopping meds. Does he needs to start again? Symptoms are minimal. Feeling light headed when going from sitting to standing, having some scalp issues.   Dr. Lux  was notified.      QUEENIE VELEZ CMA                  Again, thank you for allowing me to participate in the care of your " patient.        Sincerely,        Elizabeth Lux MD    Electronically signed

## 2025-01-24 NOTE — PROGRESS NOTES
"Oncology Rooming Note    January 24, 2025 11:49 AM   Walt Estrada is a 64 year old male who presents for:    Chief Complaint   Patient presents with    Oncology Clinic Visit     Return visit with labs and infusion      Initial Vitals: BP (!) 149/94 (BP Location: Left arm, Patient Position: Sitting, Cuff Size: Adult Large)   Pulse 75   Temp 98.2  F (36.8  C) (Oral)   Resp 18   Ht 1.803 m (5' 11\")   Wt 103.6 kg (228 lb 6.3 oz)   SpO2 96%   BMI 31.85 kg/m   Estimated body mass index is 31.85 kg/m  as calculated from the following:    Height as of this encounter: 1.803 m (5' 11\").    Weight as of this encounter: 103.6 kg (228 lb 6.3 oz). Body surface area is 2.28 meters squared.  No Pain (0) Comment: Data Unavailable   No LMP for male patient.  Allergies reviewed: Yes  Medications reviewed: Yes    Medications: Medication refills not needed today.  Pharmacy name entered into EPIC:    [facility] - [address] PHARMACY [place], [place] - [address] [facility] #[id number] [facility] [address] [facility] AT [address] [address] [place] [address] PHARMACY [place], [place] - [address] [facility]    Frailty Screening:   Is the patient here for a new oncology consult visit in cancer care? 2. No      Clinical concerns: discuss BP rising after stopping meds. Does he needs to start again? Symptoms are minimal. Feeling light headed when going from sitting to standing, having some scalp issues.   Dr. Lux  was notified.      QUEENIE VELEZ CMA              "

## 2025-01-28 ENCOUNTER — VIRTUAL VISIT (OUTPATIENT)
Dept: ONCOLOGY | Facility: HOSPITAL | Age: 65
End: 2025-01-28
Attending: PSYCHOLOGIST
Payer: MEDICARE

## 2025-01-28 VITALS — WEIGHT: 225 LBS | BODY MASS INDEX: 31.5 KG/M2 | HEIGHT: 71 IN

## 2025-01-28 DIAGNOSIS — F43.21 ADJUSTMENT DISORDER WITH DEPRESSED MOOD: Primary | ICD-10-CM

## 2025-01-28 PROCEDURE — 90837 PSYTX W PT 60 MINUTES: CPT | Mod: 93 | Performed by: PSYCHOLOGIST

## 2025-01-28 ASSESSMENT — PAIN SCALES - GENERAL: PAINLEVEL_OUTOF10: NO PAIN (0)

## 2025-01-28 NOTE — PROGRESS NOTES
Virtual Visit Details    Type of service:  telephone  Video Start Time:  0900  Video End Time: 0953    Originating Location (pt. Location): Home    Distant Location (provider location):  On-site  Platform used for Video Visit: Telephone
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
## 2025-01-28 NOTE — NURSING NOTE
Current patient location: [address] [place], [place] [address]  [facility] [address]    Is the patient currently in the state of [place]? YES    Visit mode: VIDEO    If the visit is dropped, the patient can be reconnected by:VIDEO VISIT: Send to e-mail at: [email or web address]    Will anyone else be joining the visit? NO  (If patient encounters technical issues they should call [phone]    Are changes needed to the allergy or medication list? No    Are refills needed on medications prescribed by this physician? NO    Rooming Documentation:  Questionnaire(s) not done per department protocol    Reason for visit: JESS FOX

## 2025-01-30 ENCOUNTER — PATIENT OUTREACH (OUTPATIENT)
Dept: ONCOLOGY | Facility: HOSPITAL | Age: 65
End: 2025-01-30
Payer: MEDICARE

## 2025-01-30 NOTE — PROGRESS NOTES
[facility]: Cancer Care                                                                                      RN Cancer Care Coordinator received information about the clinic pt will be using while in [place] for his chemo/Keytruda infusions.   Dr. Cruz Abel  [facility]  [address] [address] [phone]  [email or web address]    Writer called this clinic, and spoke with Vijaya. Inquired what will be needed to arrange for patient to receive infusions while he is in [place]. Vijaya could see a medical records for Walt, although he hasn't been seen there yet. She eventually found documentation that our clinic is the referring location and was able to give me more information. He is scheduled to see Dr. Cruz Abel for the first time on February 26, 2025 at 8:15 AM.     Vijaya provided direct fax number for Dr. Abel's clinic to send recent progress notes and treatment plan: [phone]    Writer notes that in Dr. Lux's note 1/24/25 he thought patient would have cycle 3 (2/14/25) and cycle 4 (3/7/25) with us, and then go to [place] where he would continue to receive only 3 weekly Keytruda. With pt planning on being there by 2/26, writer will let Dr. Lux know that he will need one cycle of chemo + Keytruda while there.     Writer will clarify plan with the patient and Dr. Lux and send the necessary documentation.    Signature:  Apryl Bailon RN

## 2025-02-04 ENCOUNTER — VIRTUAL VISIT (OUTPATIENT)
Dept: ONCOLOGY | Facility: HOSPITAL | Age: 65
End: 2025-02-04
Attending: PSYCHOLOGIST
Payer: MEDICARE

## 2025-02-04 VITALS — HEIGHT: 71 IN | BODY MASS INDEX: 31.5 KG/M2 | WEIGHT: 225 LBS

## 2025-02-04 DIAGNOSIS — F43.21 ADJUSTMENT DISORDER WITH DEPRESSED MOOD: Primary | ICD-10-CM

## 2025-02-04 PROCEDURE — 90832 PSYTX W PT 30 MINUTES: CPT | Mod: 95 | Performed by: PSYCHOLOGIST

## 2025-02-04 ASSESSMENT — PAIN SCALES - GENERAL: PAINLEVEL_OUTOF10: NO PAIN (0)

## 2025-02-04 NOTE — PROGRESS NOTES
Virtual Visit Details    Type of service:  Video Visit   Video Start Time:  0900  Video End Time: 0931    Originating Location (pt. Location): Home    Distant Location (provider location):  On-site  Platform used for Video Visit: [employer]

## 2025-02-04 NOTE — NURSING NOTE
Patient confirms medications and allergies are accurate via patients echeck in completion, and or denies any changes since last reviewed/verified.     Current patient location: [address]  [facility] [address]    Is the patient currently in the state of [place]? YES    Visit mode: VIDEO    If the visit is dropped, the patient can be reconnected by:VIDEO VISIT: Text to cell phone:   Telephone Information:   Mobile [phone]   Mobile Not on file.       Will anyone else be joining the visit? NO  (If patient encounters technical issues they should call [phone])    Are changes needed to the allergy or medication list? No    Are refills needed on medications prescribed by this physician? NO    Rooming Documentation:  Questionnaire(s) not done per department protocol    Reason for visit: RECHECK    Jessica FOX
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
## 2025-02-12 ENCOUNTER — HOSPITAL ENCOUNTER (OUTPATIENT)
Dept: CT IMAGING | Facility: HOSPITAL | Age: 65
Discharge: HOME | End: 2025-02-12
Attending: NURSE PRACTITIONER
Payer: MEDICARE

## 2025-02-12 DIAGNOSIS — C34.91 NON-SMALL CELL CARCINOMA OF LUNG, RIGHT (H): ICD-10-CM

## 2025-02-12 PROCEDURE — 250N000011 HC RX IP 250 OP 636: Performed by: NURSE PRACTITIONER

## 2025-02-12 PROCEDURE — 74177 CT ABD & PELVIS W/CONTRAST: CPT

## 2025-02-12 PROCEDURE — 250N000009 HC RX 250: Performed by: NURSE PRACTITIONER

## 2025-02-12 PROCEDURE — 71260 CT THORAX DX C+: CPT

## 2025-02-12 RX ORDER — IOPAMIDOL 755 MG/ML
90 INJECTION, SOLUTION INTRAVASCULAR ONCE
Status: COMPLETED | OUTPATIENT
Start: 2025-02-12 | End: 2025-02-12

## 2025-02-12 RX ADMIN — SODIUM CHLORIDE 72 ML: 9 INJECTION, SOLUTION INTRAVENOUS at 10:28

## 2025-02-12 RX ADMIN — IOPAMIDOL 90 ML: 755 INJECTION, SOLUTION INTRAVENOUS at 10:27

## 2025-02-26 ENCOUNTER — TRANSFERRED RECORDS (OUTPATIENT)
Dept: HEALTH INFORMATION MANAGEMENT | Facility: CLINIC | Age: 65
End: 2025-02-26
Payer: MEDICARE

## 2025-03-04 ENCOUNTER — PATIENT OUTREACH (OUTPATIENT)
Dept: ONCOLOGY | Facility: HOSPITAL | Age: 65
End: 2025-03-04
Payer: MEDICARE

## 2025-03-04 NOTE — PROGRESS NOTES
[facility]: Cancer Care                                                                                      RN Cancer Care Coordinator sent MyC note for coordination of patient's care while he is [place] next month.    Signature:  Apryl Bailon RN

## 2025-03-06 ENCOUNTER — ONCOLOGY VISIT (OUTPATIENT)
Dept: ONCOLOGY | Facility: HOSPITAL | Age: 65
End: 2025-03-06
Attending: PSYCHOLOGIST
Payer: MEDICARE

## 2025-03-06 DIAGNOSIS — F43.21 ADJUSTMENT DISORDER WITH DEPRESSED MOOD: Primary | ICD-10-CM
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
## 2025-03-07 PROBLEM — D3A.8 NEUROENDOCRINE TUMOR OF PANCREAS (H): Status: ACTIVE | Noted: 2025-03-07

## 2025-03-11 ENCOUNTER — PATIENT OUTREACH (OUTPATIENT)
Dept: ONCOLOGY | Facility: HOSPITAL | Age: 65
End: 2025-03-11
Payer: MEDICARE

## 2025-05-21 ENCOUNTER — ONCOLOGY VISIT (OUTPATIENT)
Dept: ONCOLOGY | Facility: HOSPITAL | Age: 65
End: 2025-05-21
Attending: RADIOLOGY
Payer: MEDICARE

## 2025-05-21 DIAGNOSIS — F43.21 ADJUSTMENT DISORDER WITH DEPRESSED MOOD: Primary | ICD-10-CM

## 2025-05-21 PROCEDURE — 90837 PSYTX W PT 60 MINUTES: CPT | Performed by: PSYCHOLOGIST
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
## 2025-06-04 ENCOUNTER — TELEPHONE (OUTPATIENT)
Dept: ONCOLOGY | Facility: HOSPITAL | Age: 65
End: 2025-06-04

## 2025-06-04 ENCOUNTER — VIRTUAL VISIT (OUTPATIENT)
Dept: ONCOLOGY | Facility: HOSPITAL | Age: 65
End: 2025-06-04
Attending: RADIOLOGY
Payer: MEDICARE

## 2025-06-04 VITALS — HEIGHT: 71 IN | WEIGHT: 225 LBS | BODY MASS INDEX: 31.5 KG/M2

## 2025-06-04 DIAGNOSIS — F43.21 ADJUSTMENT DISORDER WITH DEPRESSED MOOD: Primary | ICD-10-CM

## 2025-06-04 DIAGNOSIS — C34.11 MALIGNANT NEOPLASM OF UPPER LOBE OF RIGHT LUNG (H): Primary | ICD-10-CM

## 2025-06-04 PROCEDURE — 90837 PSYTX W PT 60 MINUTES: CPT | Mod: 95 | Performed by: PSYCHOLOGIST

## 2025-06-04 PROCEDURE — 1125F AMNT PAIN NOTED PAIN PRSNT: CPT | Mod: 95 | Performed by: PSYCHOLOGIST

## 2025-06-04 ASSESSMENT — PAIN SCALES - GENERAL: PAINLEVEL_OUTOF10: MILD PAIN (2)

## 2025-06-04 NOTE — PROGRESS NOTES
Is the patient currently in the state of [place]? YES    Visit mode: VIDEO    If the visit is dropped, the patient can be reconnected by:VIDEO VISIT: Send to e-mail at: [email or web address]    Will anyone else be joining the visit? NO  (If patient encounters technical issues they should call [phone]    Are changes needed to the allergy or medication list? No    Are refills needed on medications prescribed by this physician? NO    Rooming Documentation:  Questionnaire(s) completed    Reason for visit: Video Visit (Follow Up)    Roslyn FOX

## 2025-06-04 NOTE — PROGRESS NOTES
Virtual Visit Details    Type of service:  Video Visit   Video Start Time: 0854  Video End Time:0950    Originating Location (pt. Location): Home    Distant Location (provider location):  On-site  Platform used for Video Visit: [employer]

## 2025-06-04 NOTE — TELEPHONE ENCOUNTER
Patient calls with concerns regarding symptoms he is having. He reports that he has shortness of breath with exertion. This has been going on since the start of treatment and with current immunotherapy maintenance. He denies any cough or chest pain. Denies any difficulty breathing at rest. He is also having joint/muscle pain and more fatigue. This has started more recently with keytruda maintenance. He reports that the pain is the worst in the morning, does improve once he gets up and is moving around. Patient says that overall he is feeling more fatigue. He is eating and drinking fine.     Symptoms reviewed with Dr. Lux. He recommends that patient have a CT and see about moving up appointment with Miranda Hernandez CNP. Fatigue can be a problem with immunotherapy maintenance, or could be residual effect of the chemotherapy. Similarly joint aches and pains can be immunotherapy related. Shortness of breath could be postradiation effect including postradiation pneumonitis or potentially could be immunotherapy related pneumonitis. So it would be best to get a CT to evaluate.     Return call placed to patient with recommendations. He is agreeable with plan. Scheduling staff will reach out to get CT scheduled. Patient would like to have it done in [place] if possible.    Becki Reed RN
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
## 2025-06-05 ENCOUNTER — TELEPHONE (OUTPATIENT)
Dept: ONCOLOGY | Facility: HOSPITAL | Age: 65
End: 2025-06-05
Payer: MEDICARE

## 2025-06-05 ENCOUNTER — INFUSION THERAPY VISIT (OUTPATIENT)
Dept: INFUSION THERAPY | Facility: CLINIC | Age: 65
End: 2025-06-05
Attending: NURSE PRACTITIONER
Payer: MEDICARE

## 2025-06-05 DIAGNOSIS — C79.51 MALIGNANT NEOPLASM METASTATIC TO BONE (H): Primary | ICD-10-CM

## 2025-06-05 DIAGNOSIS — C34.11 MALIGNANT NEOPLASM OF UPPER LOBE OF RIGHT LUNG (H): ICD-10-CM

## 2025-06-05 PROCEDURE — 250N000011 HC RX IP 250 OP 636: Performed by: INTERNAL MEDICINE

## 2025-06-05 RX ORDER — HEPARIN SODIUM (PORCINE) LOCK FLUSH IV SOLN 100 UNIT/ML 100 UNIT/ML
5 SOLUTION INTRAVENOUS
Status: DISCONTINUED | OUTPATIENT
Start: 2025-06-05 | End: 2025-06-05 | Stop reason: HOSPADM

## 2025-06-05 RX ORDER — HEPARIN SODIUM (PORCINE) LOCK FLUSH IV SOLN 100 UNIT/ML 100 UNIT/ML
5 SOLUTION INTRAVENOUS
OUTPATIENT
Start: 2025-06-05

## 2025-06-05 RX ADMIN — HEPARIN 5 ML: 100 SYRINGE at 10:07

## 2025-06-05 NOTE — TELEPHONE ENCOUNTER
[facility] called this morning as they received STAT CT orders for Walt however they did not get signed. MAURO Bravo stated that she could sign them in Dr. Lux's absence today. Let Sheridan at CT know this and she states that she will send it to her as soon as she gets back to her desk.     [facility] phone number [phone]    Shelly Underwood RN on 6/5/2025 at 8:51 AM

## 2025-06-05 NOTE — PROGRESS NOTES
Infusion Nursing Note:  Walt Salcedodonna presents today for Port access for Stat CT.    Patient seen by provider today: No   present during visit today: Not Applicable.    Note: N/A.      Intravenous Access:  Implanted Port.    Post CT Assessment:  Blood return noted pre and post CT scan.  Site patent and intact, free from redness, edema or discomfort.  No evidence of extravasations.  Access discontinued per protocol.       Discharge Plan:   AVS to patient via MYCHART.    Patient discharged in stable condition accompanied by: self.  Departure Mode: Ambulatory.      Carolyn Loco RN

## 2025-06-09 DIAGNOSIS — C34.11 MALIGNANT NEOPLASM OF UPPER LOBE OF RIGHT LUNG (H): ICD-10-CM

## 2025-06-09 DIAGNOSIS — C79.51 MALIGNANT NEOPLASM METASTATIC TO BONE (H): Primary | ICD-10-CM

## 2025-06-10 ENCOUNTER — TELEPHONE (OUTPATIENT)
Dept: ONCOLOGY | Facility: HOSPITAL | Age: 65
End: 2025-06-10
Payer: MEDICARE

## 2025-06-10 ENCOUNTER — PATIENT OUTREACH (OUTPATIENT)
Dept: ONCOLOGY | Facility: HOSPITAL | Age: 65
End: 2025-06-10
Payer: MEDICARE

## 2025-06-10 NOTE — TELEPHONE ENCOUNTER
Received a call from Walt this morning regarding symptoms that he has. He states that as of yesterday, his cough is now productive. He states that he is getting up some yellowish gunk now when he coughs. He states that the cough he has had for about a week, which is why he got his CT done, but states now it's productive, but it was not before. Walt wanted to let the doctor know this and also is wondering if that means he needs to cancel his treatment that he has scheduled for tomorrow. Denies any fevers.       Let him know that I would review this with the provider and let him know.         Shelly Underwood RN on 6/10/2025 at 9:16 AM

## 2025-06-10 NOTE — TELEPHONE ENCOUNTER
RNApryl MARTIN stated she would call the patient to speak with him about this.       Shelly Underwood RN on 6/10/2025 at 11:06 AM

## 2025-06-10 NOTE — PROGRESS NOTES
[facility]: Cancer Care                                                                                      RN Cancer Care Coordinator was notified of symptom change for patient by Shelly Underwood, Triage RN. The question patient had was if he should cancel his appt tomorrow for treatment or come in for it. Writer called patient and clarified that he was not feeling too sick to come in. Let him know he should come in for the appts as scheduled. He will have lab and RTC with Miranda Hernandez CNP to evaluate him. Dr. Lux is in clinic as well. They will determine if he is stable enough for treatment, or what next steps would be.     He voiced understanding and agreement with plan.     Signature:  Apryl Bailon RN

## 2025-06-11 ENCOUNTER — INFUSION THERAPY VISIT (OUTPATIENT)
Dept: INFUSION THERAPY | Facility: HOSPITAL | Age: 65
End: 2025-06-11
Attending: INTERNAL MEDICINE
Payer: MEDICARE

## 2025-06-11 ENCOUNTER — PATIENT OUTREACH (OUTPATIENT)
Dept: ONCOLOGY | Facility: HOSPITAL | Age: 65
End: 2025-06-11

## 2025-06-11 ENCOUNTER — ONCOLOGY VISIT (OUTPATIENT)
Dept: ONCOLOGY | Facility: HOSPITAL | Age: 65
End: 2025-06-11
Attending: INTERNAL MEDICINE
Payer: MEDICARE

## 2025-06-11 VITALS
DIASTOLIC BLOOD PRESSURE: 87 MMHG | RESPIRATION RATE: 18 BRPM | HEIGHT: 71 IN | WEIGHT: 235 LBS | OXYGEN SATURATION: 97 % | HEART RATE: 76 BPM | BODY MASS INDEX: 32.9 KG/M2 | TEMPERATURE: 98.3 F | SYSTOLIC BLOOD PRESSURE: 139 MMHG

## 2025-06-11 DIAGNOSIS — C34.11 MALIGNANT NEOPLASM OF UPPER LOBE OF RIGHT LUNG (H): ICD-10-CM

## 2025-06-11 DIAGNOSIS — J06.9 UPPER RESPIRATORY TRACT INFECTION, UNSPECIFIED TYPE: ICD-10-CM

## 2025-06-11 DIAGNOSIS — C79.51 MALIGNANT NEOPLASM METASTATIC TO BONE (H): Primary | ICD-10-CM

## 2025-06-11 DIAGNOSIS — R06.02 SOB (SHORTNESS OF BREATH): ICD-10-CM

## 2025-06-11 DIAGNOSIS — C34.11 MALIGNANT NEOPLASM OF UPPER LOBE OF RIGHT LUNG (H): Primary | ICD-10-CM

## 2025-06-11 DIAGNOSIS — C79.51 MALIGNANT NEOPLASM METASTATIC TO BONE (H): ICD-10-CM

## 2025-06-11 LAB
ALBUMIN SERPL BCG-MCNC: 4.1 G/DL (ref 3.5–5.2)
ALP SERPL-CCNC: 88 U/L (ref 40–150)
ALT SERPL W P-5'-P-CCNC: 27 U/L (ref 0–70)
ANION GAP SERPL CALCULATED.3IONS-SCNC: 10 MMOL/L (ref 7–15)
AST SERPL W P-5'-P-CCNC: 22 U/L (ref 0–45)
BILIRUB SERPL-MCNC: 0.2 MG/DL
BUN SERPL-MCNC: 12.3 MG/DL (ref 8–23)
CALCIUM SERPL-MCNC: 9.2 MG/DL (ref 8.8–10.4)
CHLORIDE SERPL-SCNC: 105 MMOL/L (ref 98–107)
CREAT SERPL-MCNC: 0.79 MG/DL (ref 0.67–1.17)
EGFRCR SERPLBLD CKD-EPI 2021: >90 ML/MIN/1.73M2
GLUCOSE SERPL-MCNC: 117 MG/DL (ref 70–99)
HCO3 SERPL-SCNC: 27 MMOL/L (ref 22–29)
POTASSIUM SERPL-SCNC: 3.8 MMOL/L (ref 3.4–5.3)
PROT SERPL-MCNC: 6.4 G/DL (ref 6.4–8.3)
SODIUM SERPL-SCNC: 142 MMOL/L (ref 135–145)
TSH SERPL DL<=0.005 MIU/L-ACNC: 3.23 UIU/ML (ref 0.3–4.2)

## 2025-06-11 PROCEDURE — 258N000003 HC RX IP 258 OP 636: Performed by: NURSE PRACTITIONER

## 2025-06-11 PROCEDURE — G0463 HOSPITAL OUTPT CLINIC VISIT: HCPCS | Performed by: NURSE PRACTITIONER

## 2025-06-11 PROCEDURE — 250N000011 HC RX IP 250 OP 636: Mod: JZ | Performed by: NURSE PRACTITIONER

## 2025-06-11 PROCEDURE — 250N000011 HC RX IP 250 OP 636: Performed by: INTERNAL MEDICINE

## 2025-06-11 PROCEDURE — 36591 DRAW BLOOD OFF VENOUS DEVICE: CPT | Performed by: NURSE PRACTITIONER

## 2025-06-11 PROCEDURE — 84132 ASSAY OF SERUM POTASSIUM: CPT | Performed by: NURSE PRACTITIONER

## 2025-06-11 PROCEDURE — 84443 ASSAY THYROID STIM HORMONE: CPT | Performed by: NURSE PRACTITIONER

## 2025-06-11 PROCEDURE — G2211 COMPLEX E/M VISIT ADD ON: HCPCS | Performed by: NURSE PRACTITIONER

## 2025-06-11 PROCEDURE — 99214 OFFICE O/P EST MOD 30 MIN: CPT | Performed by: NURSE PRACTITIONER

## 2025-06-11 PROCEDURE — 96413 CHEMO IV INFUSION 1 HR: CPT

## 2025-06-11 RX ORDER — METHYLPREDNISOLONE SODIUM SUCCINATE 40 MG/ML
40 INJECTION INTRAMUSCULAR; INTRAVENOUS
Start: 2025-07-02

## 2025-06-11 RX ORDER — DIPHENHYDRAMINE HYDROCHLORIDE 50 MG/ML
25 INJECTION, SOLUTION INTRAMUSCULAR; INTRAVENOUS
Status: CANCELLED
Start: 2025-06-11

## 2025-06-11 RX ORDER — ALBUTEROL SULFATE 90 UG/1
1-2 INHALANT RESPIRATORY (INHALATION)
Status: CANCELLED
Start: 2025-06-11

## 2025-06-11 RX ORDER — LORAZEPAM 2 MG/ML
0.5 INJECTION INTRAMUSCULAR EVERY 4 HOURS PRN
Status: CANCELLED | OUTPATIENT
Start: 2025-06-11

## 2025-06-11 RX ORDER — LEVOFLOXACIN 500 MG/1
500 TABLET, FILM COATED ORAL DAILY
Qty: 10 TABLET | Refills: 0 | Status: SHIPPED | OUTPATIENT
Start: 2025-06-11 | End: 2025-06-21

## 2025-06-11 RX ORDER — ALBUTEROL SULFATE 0.83 MG/ML
2.5 SOLUTION RESPIRATORY (INHALATION)
Status: CANCELLED | OUTPATIENT
Start: 2025-06-11

## 2025-06-11 RX ORDER — EPINEPHRINE 1 MG/ML
0.3 INJECTION, SOLUTION INTRAMUSCULAR; SUBCUTANEOUS EVERY 5 MIN PRN
OUTPATIENT
Start: 2025-07-23

## 2025-06-11 RX ORDER — MEPERIDINE HYDROCHLORIDE 25 MG/ML
25 INJECTION INTRAMUSCULAR; INTRAVENOUS; SUBCUTANEOUS
OUTPATIENT
Start: 2025-07-02

## 2025-06-11 RX ORDER — LORAZEPAM 2 MG/ML
0.5 INJECTION INTRAMUSCULAR EVERY 4 HOURS PRN
OUTPATIENT
Start: 2025-07-02

## 2025-06-11 RX ORDER — DIPHENHYDRAMINE HYDROCHLORIDE 50 MG/ML
50 INJECTION, SOLUTION INTRAMUSCULAR; INTRAVENOUS
Status: CANCELLED
Start: 2025-06-11

## 2025-06-11 RX ORDER — HEPARIN SODIUM (PORCINE) LOCK FLUSH IV SOLN 100 UNIT/ML 100 UNIT/ML
5 SOLUTION INTRAVENOUS
OUTPATIENT
Start: 2025-06-11

## 2025-06-11 RX ORDER — EPINEPHRINE 1 MG/ML
0.3 INJECTION, SOLUTION INTRAMUSCULAR; SUBCUTANEOUS EVERY 5 MIN PRN
Status: CANCELLED | OUTPATIENT
Start: 2025-06-11

## 2025-06-11 RX ORDER — DIPHENHYDRAMINE HYDROCHLORIDE 50 MG/ML
50 INJECTION, SOLUTION INTRAMUSCULAR; INTRAVENOUS
Start: 2025-07-02

## 2025-06-11 RX ORDER — DIPHENHYDRAMINE HYDROCHLORIDE 50 MG/ML
50 INJECTION, SOLUTION INTRAMUSCULAR; INTRAVENOUS
Start: 2025-07-23

## 2025-06-11 RX ORDER — HEPARIN SODIUM (PORCINE) LOCK FLUSH IV SOLN 100 UNIT/ML 100 UNIT/ML
5 SOLUTION INTRAVENOUS
Status: DISCONTINUED | OUTPATIENT
Start: 2025-06-11 | End: 2025-06-11 | Stop reason: HOSPADM

## 2025-06-11 RX ORDER — METHYLPREDNISOLONE SODIUM SUCCINATE 40 MG/ML
40 INJECTION INTRAMUSCULAR; INTRAVENOUS
Status: CANCELLED
Start: 2025-06-11

## 2025-06-11 RX ORDER — ALBUTEROL SULFATE 0.83 MG/ML
2.5 SOLUTION RESPIRATORY (INHALATION)
OUTPATIENT
Start: 2025-07-23

## 2025-06-11 RX ORDER — ALBUTEROL SULFATE 90 UG/1
1-2 INHALANT RESPIRATORY (INHALATION)
Start: 2025-07-02

## 2025-06-11 RX ORDER — ALBUTEROL SULFATE 90 UG/1
1-2 INHALANT RESPIRATORY (INHALATION)
Start: 2025-07-23

## 2025-06-11 RX ORDER — DIPHENHYDRAMINE HYDROCHLORIDE 50 MG/ML
50 INJECTION, SOLUTION INTRAMUSCULAR; INTRAVENOUS
Status: DISCONTINUED | OUTPATIENT
Start: 2025-06-11 | End: 2025-06-11 | Stop reason: HOSPADM

## 2025-06-11 RX ORDER — DIPHENHYDRAMINE HYDROCHLORIDE 50 MG/ML
25 INJECTION, SOLUTION INTRAMUSCULAR; INTRAVENOUS
Status: DISCONTINUED | OUTPATIENT
Start: 2025-06-11 | End: 2025-06-11 | Stop reason: HOSPADM

## 2025-06-11 RX ORDER — DIPHENHYDRAMINE HYDROCHLORIDE 50 MG/ML
25 INJECTION, SOLUTION INTRAMUSCULAR; INTRAVENOUS
Start: 2025-07-23

## 2025-06-11 RX ORDER — LORAZEPAM 2 MG/ML
0.5 INJECTION INTRAMUSCULAR EVERY 4 HOURS PRN
OUTPATIENT
Start: 2025-07-23

## 2025-06-11 RX ORDER — DIPHENHYDRAMINE HYDROCHLORIDE 50 MG/ML
25 INJECTION, SOLUTION INTRAMUSCULAR; INTRAVENOUS
Start: 2025-07-02

## 2025-06-11 RX ORDER — METHYLPREDNISOLONE SODIUM SUCCINATE 40 MG/ML
40 INJECTION INTRAMUSCULAR; INTRAVENOUS
Start: 2025-07-23

## 2025-06-11 RX ORDER — MEPERIDINE HYDROCHLORIDE 25 MG/ML
25 INJECTION INTRAMUSCULAR; INTRAVENOUS; SUBCUTANEOUS
Status: CANCELLED | OUTPATIENT
Start: 2025-06-11

## 2025-06-11 RX ORDER — MEPERIDINE HYDROCHLORIDE 25 MG/ML
25 INJECTION INTRAMUSCULAR; INTRAVENOUS; SUBCUTANEOUS
OUTPATIENT
Start: 2025-07-23

## 2025-06-11 RX ORDER — EPINEPHRINE 1 MG/ML
0.3 INJECTION, SOLUTION INTRAMUSCULAR; SUBCUTANEOUS EVERY 5 MIN PRN
Status: DISCONTINUED | OUTPATIENT
Start: 2025-06-11 | End: 2025-06-11 | Stop reason: HOSPADM

## 2025-06-11 RX ORDER — EPINEPHRINE 1 MG/ML
0.3 INJECTION, SOLUTION INTRAMUSCULAR; SUBCUTANEOUS EVERY 5 MIN PRN
OUTPATIENT
Start: 2025-07-02

## 2025-06-11 RX ORDER — ALBUTEROL SULFATE 0.83 MG/ML
2.5 SOLUTION RESPIRATORY (INHALATION)
OUTPATIENT
Start: 2025-07-02

## 2025-06-11 RX ADMIN — Medication 5 ML: at 15:43

## 2025-06-11 RX ADMIN — SODIUM CHLORIDE 200 MG: 9 INJECTION, SOLUTION INTRAVENOUS at 15:08

## 2025-06-11 ASSESSMENT — PAIN SCALES - GENERAL: PAINLEVEL_OUTOF10: NO PAIN (0)

## 2025-06-11 NOTE — PROGRESS NOTES
"Oncology Rooming Note    June 11, 2025 1:32 PM   Walt Estrada is a 65 year old male who presents for:    Chief Complaint   Patient presents with    Oncology Clinic Visit     Malignant neoplasm metastatic to bone      Initial Vitals: /87   Pulse 76   Temp 98.3  F (36.8  C)   Resp 18   Ht 1.803 m (5' 11\")   Wt 106.6 kg (235 lb)   SpO2 97%   BMI 32.78 kg/m   Estimated body mass index is 32.78 kg/m  as calculated from the following:    Height as of this encounter: 1.803 m (5' 11\").    Weight as of this encounter: 106.6 kg (235 lb). Body surface area is 2.31 meters squared.  No Pain (0) Comment: Data Unavailable   No LMP for male patient.  Allergies reviewed: Yes  Medications reviewed: Yes    Medications: Medication refills not needed today.  Pharmacy name entered into EPIC:    [facility] - [address] [place]ARMACY [place], [place] - [address] [address] [id number] [address] [facility] AT [facility] OF [address] [place] [id number] [place] [facility] - [address] PHARMACY [place] - [place], [place] - [address] [facility]    Frailty Screening:   Is the patient here for a new oncology consult visit in cancer care? 2. No    PHQ9:  Did this patient require a PHQ9?: No      Clinical concerns: SOB, cough and phlegm, achy and tired, slow start when getting up/down (painful in his joints)       Jennifer Larsen LPN              "

## 2025-06-11 NOTE — PROGRESS NOTES
Infusion Nursing Note:  Walt Estrada presents today for Keytruda.    Patient seen by provider today: Yes: Miranda Hernandez CNP   present during visit today: Not Applicable.    Note: Patient arrives to infusion area via ambulatory accompanied by his wife.  Provider changed patient Keytruda treatment today to every three weeks rather than every six weeks.  Patient is aware and agrees with this plan.      Intravenous Access:  Implanted Port.    Treatment Conditions:  Lab Results   Component Value Date     06/11/2025    POTASSIUM 3.8 06/11/2025    CR 0.79 06/11/2025    LIZA 9.2 06/11/2025    BILITOTAL 0.2 06/11/2025    ALBUMIN 4.1 06/11/2025    ALT 27 06/11/2025    AST 22 06/11/2025       Results reviewed, labs MET treatment parameters, ok to proceed with treatment.      Post Infusion Assessment:  Patient tolerated infusion without incident.  Blood return noted pre and post infusion.  Site patent and intact, free from redness, edema or discomfort.  No evidence of extravasations.  Access discontinued per protocol.       Discharge Plan:   Discharge instructions reviewed with: Patient.  Patient and/or family verbalized understanding of discharge instructions and all questions answered.  AVS to patient via [employer].  Patient will return in three weeks for next appointment.   Patient discharged in stable condition accompanied by: self and wife.  Departure Mode: Ambulatory.      ROB GIORDANO RN

## 2025-06-11 NOTE — PROGRESS NOTES
[facility] Hematology and Oncology Progress Note    Patient: Walt Estrada  MRN: [id number]  Date of Service: Jun 11, 2025          Reason for Visit    Chief Complaint   Patient presents with    Oncology Clinic Visit     Malignant neoplasm metastatic to bone        Assessment and Plan     Cancer Staging   Malignant neoplasm of upper lobe of right lung (H)  Staging form: Lung, AJCC 8th Edition  - Clinical: Stage IV (cTX, cNX, cM1) - Signed by Elizabeth Lux MD on 11/29/2024    Lung cancer, stage IV with an oligometastatic site in the sternum: Patient initially had radiation to his lung lesion as well as the sternum.  His pain is improved.  He initially had 4 cycles of triplet therapy and now is on maintenance Keytruda due to having minimal disease as well as high tumor mutation burden.  He will continue Keytruda every 3 to 6 weeks based on his schedule.  He does go to [place] in the [place] and was receiving it down there.  We will give him a 3-week dose today and he will return to [place] in 3 weeks for his next dose.  He will then see Dr. Lux in 6 weeks to decide if he will continue on the Keytruda    SOB, cough, changes on CT: Unclear what the changes on the CT scan mean.  The tumor does seem bigger but that could be fibrosis and post radiation changes.  He also has been exposed to some upper respiratory illnesses so it could be infectious.  I did speak with Dr. Lux and he would like to give antibiotics so I will start Levaquin 500 mg once a day for 10 days.  We will then get a CT scan 4 weeks later to see how things look.  If the CT scan does not show improvement after the antibiotics we will then get a PET scan.  Pt is Set up for a PET dotatate scan (to evaluate pancreatic neuroendocrine tumor) at the beginning of July but if his CT scan does not show improvement Dr. Lux would prefer to do a regular PET instead of the dotatate PET.  There is also a chance that this could be  immunotherapy related with some pneumonitis.  The CT scan does not really look like that but some of his symptoms do fit.  I did tell him if his symptoms do not improve and his CT scan shows any signs of pneumonitis, we would start prednisone.  I did give him a 3-week dose of the Keytruda today instead of a 6-week dose to try to prevent further issues.    ECOG Performance    0 - Independent    Distress Screening (within last 30 days)    1. How concerned are you about your ability to eat? : 2  2. How concerned are you about unintended weight loss or your current weight? : 0  3. How concerned are you about feeling depressed or very sad? : 0  4. How concerned are you about feeling anxious or very scared? : 0  5. Do you struggle with the loss of meaning and travon in your life? : Not at all  6. How concerned are you about work and home life issues that may be affected by your cancer? : 0  7. How concerned are you about knowing what resources are available to help you? : 0  8. Do you currently have what you would describe as Jain or spiritual struggles?: Not at all       Pain  Pain Score: No Pain (0)    Problem List    Patient Active Problem List   Diagnosis    Malignant neoplasm metastatic to bone (H)    Malignant neoplasm of upper lobe of right lung (H)    Neuroendocrine tumor of pancreas (H)        ______________________________________________________________________________    History of Present Illness    Oncologist: Dr. Lux    Diagnosis: Lung cancer, adenocarcinoma. Stage IV with solitary lung nodule and sternal met.   -11/12/24: Done that shows findings suspicious for right upper lobe primary lung neoplasm with isolated sternal mets.  Also was a subtle FDG avid soft tissue nodule, 8 mm in the pancreatic head.  Suggestive of a pancreatic neuroendocrine neoplasm.  - 10/30/2024: Patient had a right lung biopsy that did confirm non-small cell lung cancer consistent with adenocarcinoma, predominantly acinar  "growth pattern.  Less than 1%.  - 11/21/2024: Patient had biopsy of sternal lesion that did confirm metastatic moderately to poorly differentiated pulmonary adenocarcinoma.  PD-L1 showed a CPS score of 5.  NGS: No actionable mutations, MET [id number] alteration of unknown significance noted, pathogenic p53 mutation noted  TMB: 11.62 mutations per megabase.  (On the sternal mass biopsy specimen)    Treatment:   -12/6/24-12/16/24: Initially had radiation to right lung lesions and sternum  -1/3/25-3/7/25: Carboplatin, Alimta, Keytruda. Had 4 cycles.   -started maintenance Keytruda in [place]. Will continue that.     Interim History:   Is here today to continue treatment.  He recently came back from going south to [place] for the winter.  He had been getting Keytruda down there.  He said sometimes he got a 3-week dose and sometimes he got a 6-week dose.  He last received a 6-week dose and did feel like he had more side effects after that.  Patient called last week with having worsening cough and shortness of breath.  Per patient and wife it has been a chronic issue for a while but does seem slightly worse.  They were exposed to some upper respiratory infections through the grandchildren and the wife had a really bad cold for a while.  A lot of coughing involved with that.  Patient does not feel feverish or chilled.  Weight is stable.  Overall he feels like he is doing well with the Keytruda maintenance treatment.      Review of Systems    Pertinent items are noted in HPI.    Past History    Past Medical History:   Diagnosis Date    BCC (basal cell carcinoma), face     Dyslipidemia     Essential hypertension     GERD (gastroesophageal reflux disease)        PHYSICAL EXAM  /87   Pulse 76   Temp 98.3  F (36.8  C)   Resp 18   Ht 1.803 m (5' 11\")   Wt 106.6 kg (235 lb)   SpO2 97%   BMI 32.78 kg/m      GENERAL: no acute distress. Cooperative in conversation. Here with wife and son  RESP: Regular respiratory " rate. No expiratory wheezes. Lungs are clear.   NEURO: non focal. Alert and oriented x3.   PSYCH: within normal limits. No depression or anxiety.  SKIN: exposed skin is dry intact.     Lab Results    Recent Results (from the past week)   Comprehensive metabolic panel   Result Value Ref Range    Sodium 142 135 - 145 mmol/L    Potassium 3.8 3.4 - 5.3 mmol/L    Carbon Dioxide (CO2) 27 22 - 29 mmol/L    Anion Gap 10 7 - 15 mmol/L    Urea Nitrogen 12.3 8.0 - 23.0 mg/dL    Creatinine 0.79 0.67 - 1.17 mg/dL    GFR Estimate >90 >60 mL/min/1.73m2    Calcium 9.2 8.8 - 10.4 mg/dL    Chloride 105 98 - 107 mmol/L    Glucose 117 (H) 70 - 99 mg/dL    Alkaline Phosphatase 88 40 - 150 U/L    AST 22 0 - 45 U/L    ALT 27 0 - 70 U/L    Protein Total 6.4 6.4 - 8.3 g/dL    Albumin 4.1 3.5 - 5.2 g/dL    Bilirubin Total 0.2 <=1.2 mg/dL   TSH with free T4 reflex   Result Value Ref Range    TSH 3.23 0.30 - 4.20 uIU/mL       Imaging    CT Chest w Contrast  Result Date: 6/5/2025  EXAM: CT CHEST W CONTRAST LOCATION: [facility] DATE: 6/5/2025 INDICATION:  Malignant neoplasm of upper lobe of right lung (H) COMPARISON: CT chest, abdomen and pelvis performed on 2/12/2025 TECHNIQUE: CT chest with IV contrast. Multiplanar reformats were obtained. Dose reduction techniques were used. CONTRAST: ISOVUE 370, 80 mL FINDINGS: LUNGS AND PLEURA: No pleural effusion or pneumothorax is seen. 6.4 x 3.4 cm consolidative opacity in the right upper lobe is present and has mild surrounding groundglass opacification (series 4, image 104). This opacity is located in the same region of the previously noted 2.4 cm long tumor. MEDIASTINUM/AXILLAE: Stable 1.9 cm hypodense nodule in the right lobe of thyroid gland. Scattered vascular calcifications are seen within the thoracic aorta. Right chest port catheter is seen with tip terminating along the right atrium. CORONARY ARTERY CALCIFICATION: Moderate. UPPER ABDOMEN: Stable appearance of  multiple hypoattenuating lesions, a few of which have peripheral nodular enhancement. Multiple calcified granulomas are seen in the spleen. Several exophytic cysts are seen along the kidneys. Partially seen 8 mm enhancing lesion along the pancreatic head which appears similar to the prior exam (series 2, image 69). MUSCULOSKELETAL: Multilevel degenerative changes are seen in the spine. Decreased expansile appearance along the lower sternal metastasis measuring 2.7 x 2.2 cm, previously 3.1 x 2.8 cm. Stable small lucent lesion at the T12 vertebral level. Unchanged 6 mm sclerotic lesion along the T7 vertebral level.     IMPRESSION: 1.  6.4 cm consolidative opacity is present within the right upper lobe, located in the same region as the previously noted 2.4 cm right upper lobe lung tumor. Differential diagnosis includes enlarging lung tumor versus posttreatment fibrotic changes. Superimposed pneumonia cannot be excluded. Recommend short-term CT chest follow-up exam. 2.  Stable appearance of 8 mm hyperenhancing nodule in the head of pancreas, likely compatible with known neuroendocrine tumor. 3.  Decreased expansile appearance along the lower sternal metastatic lesion. 4.  Stable 1.9 cm hypodense nodule in the right lobe of thyroid gland. Recommend thyroid ultrasound follow-up per guidelines below. 5.  Stable appearance of multiple hypoattenuating lesions, a few of which have peripheral nodular enhancement suggestive of hemangiomas. These lesions were better characterized on the recent MRI study from 10/22/2024. REFERENCE: Anupam TRAN et al. Managing Incidental Thyroid Nodules Detected on Imaging: White Paper of the ACR Incidental Thyroid Findings Committee. JACR 2015; 12:143-150. Incidental thyroid nodule detected on CT or MRI without suspicious findings. Applies to general population without limited life expectancy or significant comorbidities. Age greater than or equal to 35 years Less than 1.5 cm: No further  evaluation. Greater than or equal to 1.5 cm: Evaluate with thyroid ultrasound.    Discussed with Dr. Lux.     The longitudinal plan of care for the diagnosis(es)/condition(s) as documented were addressed during this visit. Due to the added complexity in care, I will continue to support Walt in the subsequent management and with ongoing continuity of care.      Signed by: MATEO Trotter CNP

## 2025-06-11 NOTE — LETTER
"6/11/2025      Walt Estrada  [address] [place] [address]      Dear Colleague,    Thank you for referring your patient, Walt Estrada, to the [facility]. Please see a copy of my visit note below.    Oncology Rooming Note    June 11, 2025 1:32 PM   Walt Estrada is a 65 year old male who presents for:    Chief Complaint   Patient presents with     Oncology Clinic Visit     Malignant neoplasm metastatic to bone      Initial Vitals: /87   Pulse 76   Temp 98.3  F (36.8  C)   Resp 18   Ht 1.803 m (5' 11\")   Wt 106.6 kg (235 lb)   SpO2 97%   BMI 32.78 kg/m   Estimated body mass index is 32.78 kg/m  as calculated from the following:    Height as of this encounter: 1.803 m (5' 11\").    Weight as of this encounter: 106.6 kg (235 lb). Body surface area is 2.31 meters squared.  No Pain (0) Comment: Data Unavailable   No LMP for male patient.  Allergies reviewed: Yes  Medications reviewed: Yes    Medications: Medication refills not needed today.  Pharmacy name entered into EPIC:    "[facility] - [facility], [place] - [address] [place] [address] [facility] [place] [address] [address] [id number] - [place], [place] [address] [facility] OF [address] [place] [id number] [place] [facility] - [place], [place] - [address] [place] [address] PHARMACY [place], [place] - [address] [place] [address]    Frailty Screening:   Is the patient here for a new oncology consult visit in cancer care? 2. No    PHQ9:  Did this patient require a PHQ9?: No      Clinical concerns: SOB, cough and phlegm, achy and tired, slow start when getting up/down (painful in his joints)       Jennifer Larsen LPN                [facility] Hematology and Oncology Progress Note    Patient: Walt Estrada  MRN: [id number]  Date of Service: Jun 11, 2025          Reason for Visit    Chief Complaint   Patient presents with     Oncology Clinic Visit     Malignant neoplasm " metastatic to bone        Assessment and Plan     Cancer Staging   Malignant neoplasm of upper lobe of right lung (H)  Staging form: Lung, AJCC 8th Edition  - Clinical: Stage IV (cTX, cNX, cM1) - Signed by Elizabeth Lux MD on 11/29/2024    Lung cancer, stage IV with an oligometastatic site in the sternum: Patient initially had radiation to his lung lesion as well as the sternum.  His pain is improved.  He initially had 4 cycles of triplet therapy and now is on maintenance Keytruda due to having minimal disease as well as high tumor mutation burden.  He will continue Keytruda every 3 to 6 weeks based on his schedule.  He does go to [place] in the [place] and was receiving it down there.  We will give him a 3-week dose today and he will return to [place] in 3 weeks for his next dose.  He will then see Dr. Lux in 6 weeks to decide if he will continue on the Keytruda    SOB, cough, changes on CT: Unclear what the changes on the CT scan mean.  The tumor does seem bigger but that could be fibrosis and post radiation changes.  He also has been exposed to some upper respiratory illnesses so it could be infectious.  I did speak with Dr. Lux and he would like to give antibiotics so I will start Levaquin 500 mg once a day for 10 days.  We will then get a CT scan 4 weeks later to see how things look.  If the CT scan does not show improvement after the antibiotics we will then get a PET scan.  Pt is Set up for a PET dotatate scan (to evaluate pancreatic neuroendocrine tumor) at the beginning of July but if his CT scan does not show improvement Dr. Lux would prefer to do a regular PET instead of the dotatate PET.  There is also a chance that this could be immunotherapy related with some pneumonitis.  The CT scan does not really look like that but some of his symptoms do fit.  I did tell him if his symptoms do not improve and his CT scan shows any signs of pneumonitis, we would start prednisone.  I did give  him a 3-week dose of the Keytruda today instead of a 6-week dose to try to prevent further issues.    ECOG Performance    0 - Independent    Distress Screening (within last 30 days)    1. How concerned are you about your ability to eat? : 2  2. How concerned are you about unintended weight loss or your current weight? : 0  3. How concerned are you about feeling depressed or very sad? : 0  4. How concerned are you about feeling anxious or very scared? : 0  5. Do you struggle with the loss of meaning and travon in your life? : Not at all  6. How concerned are you about work and home life issues that may be affected by your cancer? : 0  7. How concerned are you about knowing what resources are available to help you? : 0  8. Do you currently have what you would describe as Episcopal or spiritual struggles?: Not at all       Pain  Pain Score: No Pain (0)    Problem List    Patient Active Problem List   Diagnosis     Malignant neoplasm metastatic to bone (H)     Malignant neoplasm of upper lobe of right lung (H)     Neuroendocrine tumor of pancreas (H)        ______________________________________________________________________________    History of Present Illness    Oncologist: Dr. Lux    Diagnosis: Lung cancer, adenocarcinoma. Stage IV with solitary lung nodule and sternal met.   -11/12/24: Done that shows findings suspicious for right upper lobe primary lung neoplasm with isolated sternal mets.  Also was a subtle FDG avid soft tissue nodule, 8 mm in the pancreatic head.  Suggestive of a pancreatic neuroendocrine neoplasm.  - 10/30/2024: Patient had a right lung biopsy that did confirm non-small cell lung cancer consistent with adenocarcinoma, predominantly acinar growth pattern.  Less than 1%.  - 11/21/2024: Patient had biopsy of sternal lesion that did confirm metastatic moderately to poorly differentiated pulmonary adenocarcinoma.  PD-L1 showed a CPS score of 5.  NGS: No actionable mutations, MET [id number]  "alteration of unknown significance noted, pathogenic p53 mutation noted  TMB: 11.62 mutations per megabase.  (On the sternal mass biopsy specimen)    Treatment:   -12/6/24-12/16/24: Initially had radiation to right lung lesions and sternum  -1/3/25-3/7/25: Carboplatin, Alimta, Keytruda. Had 4 cycles.   -started maintenance Keytruda in [place]. Will continue that.     Interim History:   Is here today to continue treatment.  He recently came back from going south to [place] for the winter.  He had been getting Keytruda down there.  He said sometimes he got a 3-week dose and sometimes he got a 6-week dose.  He last received a 6-week dose and did feel like he had more side effects after that.  Patient called last week with having worsening cough and shortness of breath.  Per patient and wife it has been a chronic issue for a while but does seem slightly worse.  They were exposed to some upper respiratory infections through the grandchildren and the wife had a really bad cold for a while.  A lot of coughing involved with that.  Patient does not feel feverish or chilled.  Weight is stable.  Overall he feels like he is doing well with the Keytruda maintenance treatment.      Review of Systems    Pertinent items are noted in HPI.    Past History    Past Medical History:   Diagnosis Date     BCC (basal cell carcinoma), face      Dyslipidemia      Essential hypertension      GERD (gastroesophageal reflux disease)        PHYSICAL EXAM  /87   Pulse 76   Temp 98.3  F (36.8  C)   Resp 18   Ht 1.803 m (5' 11\")   Wt 106.6 kg (235 lb)   SpO2 97%   BMI 32.78 kg/m      GENERAL: no acute distress. Cooperative in conversation. Here with wife and son  RESP: Regular respiratory rate. No expiratory wheezes. Lungs are clear.   NEURO: non focal. Alert and oriented x3.   PSYCH: within normal limits. No depression or anxiety.  SKIN: exposed skin is dry intact.     Lab Results    Recent Results (from the past week)   Comprehensive " metabolic panel   Result Value Ref Range    Sodium 142 135 - 145 mmol/L    Potassium 3.8 3.4 - 5.3 mmol/L    Carbon Dioxide (CO2) 27 22 - 29 mmol/L    Anion Gap 10 7 - 15 mmol/L    Urea Nitrogen 12.3 8.0 - 23.0 mg/dL    Creatinine 0.79 0.67 - 1.17 mg/dL    GFR Estimate >90 >60 mL/min/1.73m2    Calcium 9.2 8.8 - 10.4 mg/dL    Chloride 105 98 - 107 mmol/L    Glucose 117 (H) 70 - 99 mg/dL    Alkaline Phosphatase 88 40 - 150 U/L    AST 22 0 - 45 U/L    ALT 27 0 - 70 U/L    Protein Total 6.4 6.4 - 8.3 g/dL    Albumin 4.1 3.5 - 5.2 g/dL    Bilirubin Total 0.2 <=1.2 mg/dL   TSH with free T4 reflex   Result Value Ref Range    TSH 3.23 0.30 - 4.20 uIU/mL       Imaging    CT Chest w Contrast  Result Date: 6/5/2025  EXAM: CT CHEST W CONTRAST LOCATION: [facility] DATE: 6/5/2025 INDICATION:  Malignant neoplasm of upper lobe of right lung (H) COMPARISON: CT chest, abdomen and pelvis performed on 2/12/2025 TECHNIQUE: CT chest with IV contrast. Multiplanar reformats were obtained. Dose reduction techniques were used. CONTRAST: ISOVUE 370, 80 mL FINDINGS: LUNGS AND PLEURA: No pleural effusion or pneumothorax is seen. 6.4 x 3.4 cm consolidative opacity in the right upper lobe is present and has mild surrounding groundglass opacification (series 4, image 104). This opacity is located in the same region of the previously noted 2.4 cm long tumor. MEDIASTINUM/AXILLAE: Stable 1.9 cm hypodense nodule in the right lobe of thyroid gland. Scattered vascular calcifications are seen within the thoracic aorta. Right chest port catheter is seen with tip terminating along the right atrium. CORONARY ARTERY CALCIFICATION: Moderate. UPPER ABDOMEN: Stable appearance of multiple hypoattenuating lesions, a few of which have peripheral nodular enhancement. Multiple calcified granulomas are seen in the spleen. Several exophytic cysts are seen along the kidneys. Partially seen 8 mm enhancing lesion along the pancreatic head  which appears similar to the prior exam (series 2, image 69). MUSCULOSKELETAL: Multilevel degenerative changes are seen in the spine. Decreased expansile appearance along the lower sternal metastasis measuring 2.7 x 2.2 cm, previously 3.1 x 2.8 cm. Stable small lucent lesion at the T12 vertebral level. Unchanged 6 mm sclerotic lesion along the T7 vertebral level.     IMPRESSION: 1.  6.4 cm consolidative opacity is present within the right upper lobe, located in the same region as the previously noted 2.4 cm right upper lobe lung tumor. Differential diagnosis includes enlarging lung tumor versus posttreatment fibrotic changes. Superimposed pneumonia cannot be excluded. Recommend short-term CT chest follow-up exam. 2.  Stable appearance of 8 mm hyperenhancing nodule in the head of pancreas, likely compatible with known neuroendocrine tumor. 3.  Decreased expansile appearance along the lower sternal metastatic lesion. 4.  Stable 1.9 cm hypodense nodule in the right lobe of thyroid gland. Recommend thyroid ultrasound follow-up per guidelines below. 5.  Stable appearance of multiple hypoattenuating lesions, a few of which have peripheral nodular enhancement suggestive of hemangiomas. These lesions were better characterized on the recent MRI study from 10/22/2024. REFERENCE: Anupam TRAN et al. Managing Incidental Thyroid Nodules Detected on Imaging: White Paper of the ACR Incidental Thyroid Findings Committee. JACR 2015; 12:143-150. Incidental thyroid nodule detected on CT or MRI without suspicious findings. Applies to general population without limited life expectancy or significant comorbidities. Age greater than or equal to 35 years Less than 1.5 cm: No further evaluation. Greater than or equal to 1.5 cm: Evaluate with thyroid ultrasound.    Discussed with Dr. Lux.     The longitudinal plan of care for the diagnosis(es)/condition(s) as documented were addressed during this visit. Due to the added complexity in care, I  will continue to support Walt in the subsequent management and with ongoing continuity of care.      Signed by: MATEO Trotter CNP    Again, thank you for allowing me to participate in the care of your patient.        Sincerely,        MATEO Trotter CNP    Electronically signed

## 2025-06-11 NOTE — PROGRESS NOTES
[facility]: Cancer Care                                                                                      RN Cancer Care Coordinator called patient (with wife, Aleyda) on speaker at the request of Miranda Hernandez CNP. Described new plan she discussed with Dr. Lux.     She has ordered an antibiotic, which he has already started today. She has ordered a CT scan to be done on July 7. The result of this scan will determine next step. Writer explained this to pt and his wife. He said, so depending on the CT I will get PET A or PET B. Writer confirmed this - that there will be a PET regardless.     Per Miranda: I did speak with Dr. Lux and he would like to give antibiotics so I will start Levaquin 500 mg once a day for 10 days.  We will then get a CT scan 4 weeks later to see how things look.  If the CT scan does not show improvement after the antibiotics we will then get a PET scan.  Set up for a PET dotatate scan at the beginning of July but if his CT scan does not show improvement Dr. Lux would prefer to do a regular PET instead of the dotatate PET.  There is also a chance that this could be immunotherapy related with some pneumonitis.  The CT scan does not really look like that but some of his symptoms do fit.  I did tell him if his symptoms do not improve and his CT scan shows any signs of pneumonitis we would start prednisone.  I did give him a 3-week dose of the Keytruda today instead of a 6-week dose to try to prevent further issues.     Pt and wife verbalized understanding and agreement with plan.     Writer will discuss with schedulers to get CT set up on July 7 - the proper amount of time prior to the scheduled PET.      Signature:  Apryl Bailon RN

## 2025-06-18 ENCOUNTER — VIRTUAL VISIT (OUTPATIENT)
Dept: ONCOLOGY | Facility: HOSPITAL | Age: 65
End: 2025-06-18
Attending: RADIOLOGY
Payer: MEDICARE

## 2025-06-18 VITALS — HEIGHT: 71 IN | WEIGHT: 225 LBS | BODY MASS INDEX: 31.5 KG/M2

## 2025-06-18 DIAGNOSIS — F43.21 ADJUSTMENT DISORDER WITH DEPRESSED MOOD: Primary | ICD-10-CM

## 2025-06-18 PROCEDURE — 90832 PSYTX W PT 30 MINUTES: CPT | Mod: 95 | Performed by: PSYCHOLOGIST

## 2025-06-18 PROCEDURE — 1126F AMNT PAIN NOTED NONE PRSNT: CPT | Mod: 95 | Performed by: PSYCHOLOGIST

## 2025-06-18 ASSESSMENT — PAIN SCALES - GENERAL: PAINLEVEL_OUTOF10: NO PAIN (0)

## 2025-06-18 NOTE — NURSING NOTE
Current patient location: [address] [place], [place] [address]  [facility] [address]    Is the patient currently in the state of [place]? YES    Visit mode: VIDEO    If the visit is dropped, the patient can be reconnected by:VIDEO VISIT: Send to e-mail at: [email or web address]    Will anyone else be joining the visit? NO  (If patient encounters technical issues they should call [phone]    Are changes needed to the allergy or medication list? No    Are refills needed on medications prescribed by this physician? NO    Rooming Documentation:  Questionnaire(s) not done per department protocol    Reason for visit: JESS FOX

## 2025-06-18 NOTE — PROGRESS NOTES
Virtual Visit Details    Type of service:  Video Visit   Video Start Time: 0900  Video End Time:0920    Originating Location (pt. Location): Home    Distant Location (provider location):  On-site  Platform used for Video Visit: [employer]
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
## 2025-07-02 ENCOUNTER — INFUSION THERAPY VISIT (OUTPATIENT)
Dept: INFUSION THERAPY | Facility: CLINIC | Age: 65
End: 2025-07-02
Attending: NURSE PRACTITIONER
Payer: MEDICARE

## 2025-07-02 ENCOUNTER — LAB (OUTPATIENT)
Dept: LAB | Facility: CLINIC | Age: 65
End: 2025-07-02
Payer: MEDICARE

## 2025-07-02 VITALS
OXYGEN SATURATION: 98 % | HEART RATE: 71 BPM | DIASTOLIC BLOOD PRESSURE: 87 MMHG | RESPIRATION RATE: 16 BRPM | WEIGHT: 230.9 LBS | BODY MASS INDEX: 32.2 KG/M2 | SYSTOLIC BLOOD PRESSURE: 143 MMHG | TEMPERATURE: 97.1 F

## 2025-07-02 DIAGNOSIS — C34.11 MALIGNANT NEOPLASM OF UPPER LOBE OF RIGHT LUNG (H): ICD-10-CM

## 2025-07-02 DIAGNOSIS — C79.51 MALIGNANT NEOPLASM METASTATIC TO BONE (H): Primary | ICD-10-CM

## 2025-07-02 DIAGNOSIS — C79.51 MALIGNANT NEOPLASM METASTATIC TO BONE (H): ICD-10-CM

## 2025-07-02 LAB
ALBUMIN SERPL BCG-MCNC: 4.3 G/DL (ref 3.5–5.2)
ALP SERPL-CCNC: 80 U/L (ref 40–150)
ALT SERPL W P-5'-P-CCNC: 22 U/L (ref 0–70)
ANION GAP SERPL CALCULATED.3IONS-SCNC: 12 MMOL/L (ref 7–15)
AST SERPL W P-5'-P-CCNC: 22 U/L (ref 0–45)
BASOPHILS # BLD AUTO: 0 10E3/UL (ref 0–0.2)
BASOPHILS NFR BLD AUTO: 1 %
BILIRUB SERPL-MCNC: 0.3 MG/DL
BUN SERPL-MCNC: 16.1 MG/DL (ref 8–23)
CALCIUM SERPL-MCNC: 9.5 MG/DL (ref 8.8–10.4)
CHLORIDE SERPL-SCNC: 104 MMOL/L (ref 98–107)
CREAT SERPL-MCNC: 0.82 MG/DL (ref 0.67–1.17)
EGFRCR SERPLBLD CKD-EPI 2021: >90 ML/MIN/1.73M2
EOSINOPHIL # BLD AUTO: 0.2 10E3/UL (ref 0–0.7)
EOSINOPHIL NFR BLD AUTO: 4 %
ERYTHROCYTE [DISTWIDTH] IN BLOOD BY AUTOMATED COUNT: 13.1 % (ref 10–15)
GLUCOSE SERPL-MCNC: 74 MG/DL (ref 70–99)
HCO3 SERPL-SCNC: 26 MMOL/L (ref 22–29)
HCT VFR BLD AUTO: 37.8 % (ref 40–53)
HGB BLD-MCNC: 13 G/DL (ref 13.3–17.7)
IMM GRANULOCYTES # BLD: 0 10E3/UL
IMM GRANULOCYTES NFR BLD: 0 %
LYMPHOCYTES # BLD AUTO: 1.1 10E3/UL (ref 0.8–5.3)
LYMPHOCYTES NFR BLD AUTO: 26 %
MCH RBC QN AUTO: 30.7 PG (ref 26.5–33)
MCHC RBC AUTO-ENTMCNC: 34.4 G/DL (ref 31.5–36.5)
MCV RBC AUTO: 89 FL (ref 78–100)
MONOCYTES # BLD AUTO: 0.4 10E3/UL (ref 0–1.3)
MONOCYTES NFR BLD AUTO: 10 %
NEUTROPHILS # BLD AUTO: 2.6 10E3/UL (ref 1.6–8.3)
NEUTROPHILS NFR BLD AUTO: 59 %
NRBC # BLD AUTO: 0 10E3/UL
NRBC BLD AUTO-RTO: 0 /100
PLATELET # BLD AUTO: 182 10E3/UL (ref 150–450)
POTASSIUM SERPL-SCNC: 4 MMOL/L (ref 3.4–5.3)
PROT SERPL-MCNC: 7 G/DL (ref 6.4–8.3)
RBC # BLD AUTO: 4.24 10E6/UL (ref 4.4–5.9)
SODIUM SERPL-SCNC: 142 MMOL/L (ref 135–145)
TSH SERPL DL<=0.005 MIU/L-ACNC: 1.58 UIU/ML (ref 0.3–4.2)
WBC # BLD AUTO: 4.4 10E3/UL (ref 4–11)

## 2025-07-02 PROCEDURE — 250N000011 HC RX IP 250 OP 636: Performed by: INTERNAL MEDICINE

## 2025-07-02 PROCEDURE — 36415 COLL VENOUS BLD VENIPUNCTURE: CPT | Performed by: NURSE PRACTITIONER

## 2025-07-02 PROCEDURE — 85025 COMPLETE CBC W/AUTO DIFF WBC: CPT

## 2025-07-02 PROCEDURE — 82310 ASSAY OF CALCIUM: CPT | Performed by: NURSE PRACTITIONER

## 2025-07-02 PROCEDURE — 250N000011 HC RX IP 250 OP 636: Mod: JZ | Performed by: NURSE PRACTITIONER

## 2025-07-02 PROCEDURE — 258N000003 HC RX IP 258 OP 636: Performed by: NURSE PRACTITIONER

## 2025-07-02 PROCEDURE — 96413 CHEMO IV INFUSION 1 HR: CPT

## 2025-07-02 PROCEDURE — 84443 ASSAY THYROID STIM HORMONE: CPT | Performed by: NURSE PRACTITIONER

## 2025-07-02 RX ORDER — HEPARIN SODIUM (PORCINE) LOCK FLUSH IV SOLN 100 UNIT/ML 100 UNIT/ML
5 SOLUTION INTRAVENOUS
Status: DISCONTINUED | OUTPATIENT
Start: 2025-07-02 | End: 2025-07-02 | Stop reason: HOSPADM

## 2025-07-02 RX ORDER — HEPARIN SODIUM (PORCINE) LOCK FLUSH IV SOLN 100 UNIT/ML 100 UNIT/ML
5 SOLUTION INTRAVENOUS
OUTPATIENT
Start: 2025-07-02

## 2025-07-02 RX ADMIN — HEPARIN 5 ML: 100 SYRINGE at 16:09

## 2025-07-02 RX ADMIN — SODIUM CHLORIDE 250 ML: 0.9 INJECTION, SOLUTION INTRAVENOUS at 15:08

## 2025-07-02 RX ADMIN — SODIUM CHLORIDE 200 MG: 9 INJECTION, SOLUTION INTRAVENOUS at 15:30

## 2025-07-02 NOTE — PROGRESS NOTES
Infusion Nursing Note:  Walt Estrada presents today for C2D1 Pembrolizumab.    Patient seen by provider today: No   present during visit today: Not Applicable.    Note: Patient arrived ambulatory. Tolerating infusion every 3 weeks better, not as achy.  VSS. No issues. .      Intravenous Access:  Implanted Port.    Treatment Conditions:  Lab Results   Component Value Date    HGB 13.0 (L) 07/02/2025    WBC 4.4 07/02/2025    ANEU 2.6 07/02/2025     07/02/2025        Lab Results   Component Value Date     07/02/2025    POTASSIUM 4.0 07/02/2025    CR 0.82 07/02/2025    LIZA 9.5 07/02/2025    BILITOTAL 0.3 07/02/2025    ALBUMIN 4.3 07/02/2025    ALT 22 07/02/2025    AST 22 07/02/2025       Results reviewed, labs MET treatment parameters, ok to proceed with treatment.      Post Infusion Assessment:  Patient tolerated infusion without incident.  Blood return noted pre and post infusion.  Site patent and intact, free from redness, edema or discomfort.  No evidence of extravasations.  Access discontinued per protocol.       Discharge Plan:   Patient discharged in stable condition accompanied by: self.  Departure Mode: Ambulatory.      Miley Cabello RN

## 2025-07-03 DIAGNOSIS — E03.2 DRUG-INDUCED HYPOTHYROIDISM: Primary | ICD-10-CM

## 2025-07-07 ENCOUNTER — INFUSION THERAPY VISIT (OUTPATIENT)
Dept: INFUSION THERAPY | Facility: CLINIC | Age: 65
End: 2025-07-07
Attending: NURSE PRACTITIONER
Payer: MEDICARE

## 2025-07-07 ENCOUNTER — HOSPITAL ENCOUNTER (OUTPATIENT)
Dept: CT IMAGING | Facility: CLINIC | Age: 65
Discharge: HOME | End: 2025-07-07
Attending: NURSE PRACTITIONER
Payer: MEDICARE

## 2025-07-07 DIAGNOSIS — C34.11 MALIGNANT NEOPLASM OF UPPER LOBE OF RIGHT LUNG (H): ICD-10-CM

## 2025-07-07 DIAGNOSIS — J06.9 UPPER RESPIRATORY TRACT INFECTION, UNSPECIFIED TYPE: ICD-10-CM

## 2025-07-07 DIAGNOSIS — C79.51 MALIGNANT NEOPLASM METASTATIC TO BONE (H): Primary | ICD-10-CM

## 2025-07-07 DIAGNOSIS — R06.02 SOB (SHORTNESS OF BREATH): ICD-10-CM

## 2025-07-07 DIAGNOSIS — C79.51 MALIGNANT NEOPLASM METASTATIC TO BONE (H): ICD-10-CM

## 2025-07-07 PROCEDURE — 250N000011 HC RX IP 250 OP 636: Performed by: INTERNAL MEDICINE

## 2025-07-07 PROCEDURE — 71260 CT THORAX DX C+: CPT

## 2025-07-07 PROCEDURE — 250N000009 HC RX 250: Performed by: NURSE PRACTITIONER

## 2025-07-07 PROCEDURE — 250N000011 HC RX IP 250 OP 636: Performed by: NURSE PRACTITIONER

## 2025-07-07 RX ORDER — IOPAMIDOL 755 MG/ML
500 INJECTION, SOLUTION INTRAVASCULAR ONCE
Status: COMPLETED | OUTPATIENT
Start: 2025-07-07 | End: 2025-07-07

## 2025-07-07 RX ORDER — HEPARIN SODIUM (PORCINE) LOCK FLUSH IV SOLN 100 UNIT/ML 100 UNIT/ML
5 SOLUTION INTRAVENOUS
OUTPATIENT
Start: 2025-07-07

## 2025-07-07 RX ORDER — HEPARIN SODIUM (PORCINE) LOCK FLUSH IV SOLN 100 UNIT/ML 100 UNIT/ML
5 SOLUTION INTRAVENOUS
Status: DISCONTINUED | OUTPATIENT
Start: 2025-07-07 | End: 2025-07-07 | Stop reason: HOSPADM

## 2025-07-07 RX ADMIN — SODIUM CHLORIDE 60 ML: 9 INJECTION, SOLUTION INTRAVENOUS at 15:29

## 2025-07-07 RX ADMIN — HEPARIN 5 ML: 100 SYRINGE at 15:37

## 2025-07-07 RX ADMIN — IOPAMIDOL 81 ML: 755 INJECTION, SOLUTION INTRAVENOUS at 15:29

## 2025-07-07 NOTE — PROGRESS NOTES
Infusion Nursing Note:  Walt Salcedodonna presents today for Port access for CT scan.    Patient seen by provider today: No   present during visit today: Not Applicable.    Note: N/A.      Intravenous Access:  Implanted Port.      Post CT Assessment:  Blood return noted pre and post CT scan.  Site patent and intact, free from redness, edema or discomfort.  No evidence of extravasations.  Access discontinued per protocol.       Discharge Plan:   AVS to patient via MYCHART.    Patient discharged in stable condition accompanied by: self.  Departure Mode: Ambulatory.      Carolyn Loco RN

## 2025-07-08 ENCOUNTER — TELEPHONE (OUTPATIENT)
Dept: ONCOLOGY | Facility: HOSPITAL | Age: 65
End: 2025-07-08
Payer: MEDICARE

## 2025-07-08 DIAGNOSIS — C79.51 MALIGNANT NEOPLASM METASTATIC TO BONE (H): ICD-10-CM

## 2025-07-08 DIAGNOSIS — Z85.828 HISTORY OF SKIN CANCER: Primary | ICD-10-CM

## 2025-07-08 DIAGNOSIS — C34.11 MALIGNANT NEOPLASM OF UPPER LOBE OF RIGHT LUNG (H): ICD-10-CM

## 2025-07-08 NOTE — TELEPHONE ENCOUNTER
[facility]: Cancer Care                                                                                          Walt had a CT scan recently done.  Results were reviewed with Miranda LUDWIG.  She indicates that things look better.  She asks that I check in with Walt to see if he is having any coughing or shortness of breath.  He denies any discomfort, coughing, shortness of breath.  He indicates he is feeling fine.  He plans on keeping all appointments as is.  He asks that we place a dermatology referral due to some history of skin cancer.  That was placed and confirmed with the patient.      Signature:  Pat Jerome RN Care Coordinator  [facility]

## 2025-07-11 ENCOUNTER — TELEPHONE (OUTPATIENT)
Dept: DERMATOLOGY | Facility: CLINIC | Age: 65
End: 2025-07-11
Payer: MEDICARE

## 2025-07-11 NOTE — TELEPHONE ENCOUNTER
"Patient in workque for referral to schedule a full skin exam for hx of skin cancer      Last office visit note from provider:  \"He asks that we place a dermatology referral due to some history of skin cancer.  That was placed and confirmed with the patient.\"     "

## 2025-07-14 ENCOUNTER — OFFICE VISIT (OUTPATIENT)
Dept: RADIATION ONCOLOGY | Facility: HOSPITAL | Age: 65
End: 2025-07-14
Attending: RADIOLOGY
Payer: MEDICARE

## 2025-07-14 VITALS
SYSTOLIC BLOOD PRESSURE: 131 MMHG | WEIGHT: 228.4 LBS | RESPIRATION RATE: 16 BRPM | DIASTOLIC BLOOD PRESSURE: 84 MMHG | HEART RATE: 80 BPM | OXYGEN SATURATION: 95 % | BODY MASS INDEX: 31.86 KG/M2

## 2025-07-14 DIAGNOSIS — E07.9 THYROID MASS: Primary | ICD-10-CM

## 2025-07-14 PROCEDURE — 99214 OFFICE O/P EST MOD 30 MIN: CPT | Performed by: RADIOLOGY

## 2025-07-14 PROCEDURE — 1125F AMNT PAIN NOTED PAIN PRSNT: CPT | Performed by: RADIOLOGY

## 2025-07-14 PROCEDURE — G0463 HOSPITAL OUTPT CLINIC VISIT: HCPCS | Performed by: RADIOLOGY

## 2025-07-14 PROCEDURE — 3075F SYST BP GE 130 - 139MM HG: CPT | Performed by: RADIOLOGY

## 2025-07-14 PROCEDURE — 3079F DIAST BP 80-89 MM HG: CPT | Performed by: RADIOLOGY

## 2025-07-14 ASSESSMENT — PAIN SCALES - GENERAL: PAINLEVEL_OUTOF10: MILD PAIN (3)

## 2025-07-14 NOTE — PROGRESS NOTES
"Oncology Rooming Note    July 14, 2025 11:10 AM   Walt Estrada is a 65 year old male who presents for:    Chief Complaint   Patient presents with    Oncology Clinic Visit     Rad Onc follow up     Initial Vitals: /84   Pulse 80   Resp 16   Wt 103.6 kg (228 lb 6.4 oz)   SpO2 95%   BMI 31.86 kg/m   Estimated body mass index is 31.86 kg/m  as calculated from the following:    Height as of 6/18/25: 1.803 m (5' 11\").    Weight as of this encounter: 103.6 kg (228 lb 6.4 oz). Body surface area is 2.28 meters squared.  No Pain (0) Comment: Data Unavailable   No LMP for male patient.  Allergies reviewed: Yes  Medications reviewed: Yes    Medications: Medication refills not needed today.  Pharmacy name entered into EPIC:    [facility] - [facility], [address] [place]ARM[place]Y [place], [place] - [address] Pending sale to [facility][id number] [facility] [address] [facility] AT [address] [address] [place] [address] [facility] [place] - [place], [place] - [address] [facility]    PHQ9:  Did this patient require a PHQ9?: No      Clinical concerns: Neuropathy in hands, fatigue, mild SOB, occasional/rare cough.  Dr. Sommer was notified.      Adela Shin RN              "

## 2025-07-14 NOTE — PROGRESS NOTES
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       TYPE OF RADIATION THERAPY ADMINISTERED:  Stereotactic radiosurgery for right lung and the sternum with a total dose of 4500 (sternum) and 5000 (lung) cGy in 5 treatments given from 12/6/2024 - 12/16/2024.      INTERVAL SINCE COMPLETION OF RADIATION THERAPY: 7 months.      SUBJECTIVE:  Mr. Estrada is a 65 year old male who has been in his usual state of health until recently.  The patient presented to [facility] in August with abdominal pain symptoms which was consistent with acute appendicitis. Had CT of the abdomen and pelvis at the time which showed signs of early mild acute appendicitis. There is also an incidentally noted 0.9 cm enhancing lesion adjacent to the pancreatic neck which is suspicious for a neuroendocrine tumor. He underwent a laparoscopic appendectomy and surgical path showed acute appendicitis without evidence of any malignancy, gangrene perforation or abscess. Further evaluation of the pancreatic lesion he underwent endoscopic ultrasound-guided biopsy twice with [facility] with nondiagnostic path. Finally it was concluded that he probably has a low-grade neuroendocrine tumor which by size criteria did not need any particular intervention. He had a CT of the chest in mid October for evaluation of right lung opacities seen on chest x-ray which showed a spiculated density in the right upper lobe measuring 3.1 cm x 1.4 cm x 1.4 cm suspicious for neoplasm. He underwent CT guided biopsy of this lung lesion on 10/30/2024 with pathology showed non-small cell carcinoma most consistent with adenocarcinoma with acinar growth pattern.  MRI brain on 11/8/2024 was negative for brain metastasis.  Staging PET CT scan on 11/12/2024 showed FDG avid right  upper lobe lesion along with FDG avid lower sternal destructive bone and soft tissue lesion consistent with oligometastatic disease.  Patient underwent CT-guided bone biopsy on 11/21/2024 this confirmed metastatic adenocarcinoma consistent with lung primary. PD-L1 TPS: Less than 1%. Patient did have a significant pain in the sternum (9/10 with cough).  Patient saw Dr. Lux, medical oncology and was recommended to proceed with definitive radiation therapy using SBRT for both lung and oligo metastasis of sternum followed by chemo-immunotherapy and immunotherapy maintenance. The patient received stereotactic radiosurgery for right lung and the sternum with a total dose of 4500 (sternum) and 5000 (lung) cGy in 5 treatments given from 12/6/2024 - 12/16/2024.  The patient tolerated ration therapy well with minimal side effect.      The patient has been recovering well since radiation therapy with no ongoing issues.  Patient had a restaging CT scan on 7/7/2025 and he is here for routine post therapy office follow-up.     Medications were reviewed and are up to date on EPIC.    The following portions of the patient's history were reviewed and updated as appropriate: allergies, current medications, past family history, past medical history, past social history, past surgical history and problem list.    Review of Systems:      General  Constitutional  Constitutional (WDL): Exceptions to WDL  Fatigue: Fatigue relieved by rest  EENT  Eye Disorders  Eye Disorder (WDL): All eye disorder elements are within defined limits  Ear Disorders  Ear Disorder (WDL): All ear disorder elements are within defined limits  Respiratory  Respiratory  Respiratory (WDL): Exceptions to WDL  Dyspnea: Shortness of breath with moderate exertion  Cardiovascular  Cardiovascular  Cardiovascular (WDL): All cardiovascular elements are within defined limits  Gastrointestinal  Gastrointestinal  Gastrointestinal (WDL): All gastrointestinal elements are  within defined limits  Musculoskeletal  Musculoskeletal and Connective Tissue Disorders  Musculoskeletal & Connective (WDL): Exceptions to WDL  Arthralgia: Mild pain  Integumentary  Integumentary  Integumentary (WDL): All integumentary elements are within defined limits  Neurological  Neurosensory  Neurosensory (WDL): Exceptions to WDL  Peripheral Sensory Neuropathy: Asymptomatic  Genitourinary/Reproductive  Genitourinary  Genitourinary (WDL): All genitourinary elements are within defined limits  Lymphatic  Lymph System Disorders  Lymph (WDL): All lymph elements are within defined limits  Pain  Pain Score: Mild Pain (3)  AUA Assessment                                                              Accompanied by  Accompanied By: spouse    Objective:      PHYSICAL EXAMINATION:    /84   Pulse 80   Resp 16   Wt 103.6 kg (228 lb 6.4 oz)   SpO2 95%   BMI 31.86 kg/m      Gen: Alert, in NAD  Eyes: PERRL, EOMI, sclera anicteric  Neck: Supple, full ROM, no LAD  Pulm: No wheezing, stridor or respiratory distress  CV: Well-perfused, no cyanosis, no pedal edema  Back: No step-offs or pain to palpation along the thoracolumbar spine  Rectal: Deferred  : Deferred  Musculoskeletal: Normal muscle bulk and tone  Skin: Normal color and turgor  Neurologic: A/Ox3, CN II-XII intact, normal gait and station  Psychiatric: Appropriate mood and affect     Imaging: Imaging results 30 days: PET Dotatate Eyes to Thighs  Result Date: 7/10/2025  EXAM: PET DOTATATE EYES TO THIGHS, CT CHEST/ABDOMEN/PELVIS W CONTRAST LOCATION: [facility] DATE: 7/10/2025 INDICATION: Abnormal findings on diagnostic imaging of other abdominal regions, including retroperitoneum. COMPARISON: 7/7/2025, 2/12/2025, 11/12/2024 CONTRAST: 114 mL Isovue-370 IV TECHNIQUE: 4.23mCi Cu64 Detectnet mCi Cu 64 dotatate was administered intravenously to the patient. One hour post intravenous administration, PET imaging was performed from the skull  vertex to mid thigh, utilizing attenuation correction with concurrent axial CT and PET/CT image fusion. Separate diagnostic CT of the chest, abdomen, and pelvis was performed. Dose reduction techniques were used. PET/CT FINDINGS: Intensely enhancing mass within the pancreatic head measuring 1.1 x 1.1 x 1.0 cm with associated radiotracer uptake, consistent with primary pancreatic neuroendocrine tumor. Minimally avid posttreatment irregular consolidative opacity within the suprahilar right upper lobe. Physiologic radiotracer uptake associated with some presumed BPH nodules in the prostate transition zone. CT FINDINGS: Mild intracranial senescent changes. Moderate mucosal thickening in the paranasal sinuses. Mild carotid calcifications. Multinodular thyroid, including a 1.9 cm nodule in the right. Severe coronary artery calcium. Right chest port catheter tip in the right atrium. Photopenic right hepatic lobe lesion measuring 1.7 cm with discontinuous peripheral nodular enhancement, consistent with a benign cavernous hemangioma (4/245). Additional 1.2 cm and 1.5 cm probable hemangiomas posterior to the aforementioned hemangioma (4/252) and in the more inferior right hepatic lobe (4/288), respectively. Calcified granulomas of the spleen. Simple renal cysts, which are benign and do not require follow-up. A few colonic diverticula. Infrarenal abdominal aortic aneurysm measuring up to 3 cm AP diameter. Circumferential urinary bladder wall thickening, possibly related to underdistention. Multilevel degenerative disease in the spine.     IMPRESSION: 1.  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 2.  Hepatic hemangiomas. 3.  Multinodular thyroid, including a 1.9 cm nodule in the right. Recommend follow-up thyroid ultrasound. 4.  Circumferential urinary bladder wall thickening, possibly due to underdistention.  Consider correlation with urinalysis to exclude cystitis.     CT Chest/Abdomen/Pelvis w Contrast  Result Date: 7/10/2025  EXAM: PET DOTATATE EYES TO THIGHS, CT CHEST/ABDOMEN/PELVIS W CONTRAST LOCATION: [facility] DATE: 7/10/2025 INDICATION: Abnormal findings on diagnostic imaging of other abdominal regions, including retroperitoneum. COMPARISON: 7/7/2025, 2/12/2025, 11/12/2024 CONTRAST: 114 mL Isovue-370 IV TECHNIQUE: 4.23mCi Cu64 Detectnet mCi Cu 64 dotatate was administered intravenously to the patient. One hour post intravenous administration, PET imaging was performed from the skull vertex to mid thigh, utilizing attenuation correction with concurrent axial CT and PET/CT image fusion. Separate diagnostic CT of the chest, abdomen, and pelvis was performed. Dose reduction techniques were used. PET/CT FINDINGS: Intensely enhancing mass within the pancreatic head measuring 1.1 x 1.1 x 1.0 cm with associated radiotracer uptake, consistent with primary pancreatic neuroendocrine tumor. Minimally avid posttreatment irregular consolidative opacity within the suprahilar right upper lobe. Physiologic radiotracer uptake associated with some presumed BPH nodules in the prostate transition zone. CT FINDINGS: Mild intracranial senescent changes. Moderate mucosal thickening in the paranasal sinuses. Mild carotid calcifications. Multinodular thyroid, including a 1.9 cm nodule in the right. Severe coronary artery calcium. Right chest port catheter tip in the right atrium. Photopenic right hepatic lobe lesion measuring 1.7 cm with discontinuous peripheral nodular enhancement, consistent with a benign cavernous hemangioma (4/245). Additional 1.2 cm and 1.5 cm probable hemangiomas posterior to the aforementioned hemangioma (4/252) and in the more inferior right hepatic lobe (4/288), respectively. Calcified granulomas of the spleen. Simple renal cysts, which are benign and do not require follow-up. A few  colonic diverticula. Infrarenal abdominal aortic aneurysm measuring up to 3 cm AP diameter. Circumferential urinary bladder wall thickening, possibly related to underdistention. Multilevel degenerative disease in the spine.     IMPRESSION: 1.  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 2.  Hepatic hemangiomas. 3.  Multinodular thyroid, including a 1.9 cm nodule in the right. Recommend follow-up thyroid ultrasound. 4.  Circumferential urinary bladder wall thickening, possibly due to underdistention. Consider correlation with urinalysis to exclude cystitis.     CT Chest w Contrast  Result Date: 7/8/2025  EXAM: CT CHEST W CONTRAST LOCATION: [facility] DATE: 7/7/2025 INDICATION: Malignant neoplasm metastatic to bone (H). Upper respiratory tract infection, unspecified type, malignant neoplasm of upper lobe of right lung (H). SOB (shortness of breath). COMPARISON: CT 6/5/2025, MRI 10/22/2024. TECHNIQUE: CT chest with IV contrast. Multiplanar reformats were obtained. Dose reduction techniques were used. CONTRAST: 81 mL Isovue-370 IV. FINDINGS: LUNGS AND PLEURA: No effusion. Right upper lobe triangular irregular consolidation is slightly smaller measuring 6.1 x 3.5 cm, previously 6.4 x 3.5 cm, series 4 image 107. This leads towards the right hilum. No new focal airspace disease identified. MEDIASTINUM/AXILLAE: No acute abnormality. No adenopathy. Stable right thyroid nodule that may be further evaluated with ultrasound as needed. Right chest Port-A-Cath. CORONARY ARTERY CALCIFICATION: Moderate. UPPER ABDOMEN: Small enhancing observation at the right hepatic dome is stable suggestive of a hemangioma series 3 image 118. There are other enhancing observations within the liver that may be additional hemangiomas. Correlate with MRI from 10/22/2024 for additional details. Enhancing  nodule at the pancreas head is stable and indeterminate measuring 0.8 cm series 3 image 171. No acute upper abdominal abnormality. A few renal cysts noted without specific imaging follow-up recommended. MUSCULOSKELETAL: Degenerative changes of the spine. Expansile bone lesion at the lower sternum with sclerosis appears stable. In transverse plane this is 2.7 x 2.3 cm series 3 image 93. Stable lucency at T12 that is 0.9 cm image 150. Stable sclerosis at T7 is 0.6 cm series 8 image 79. No new bone lesion identified.     IMPRESSION: 1.  Stable versus slightly smaller irregular consolidation at the right upper lobe. No new airspace disease identified. 2.  Stable bone lesions identified at the sternum and lower thoracic spine. No new bone lesion identified. 3.  Stable enhancing nodule at the pancreas head. This may represent a neuroendocrine tumor. Stable enhancing observations within the liver that may be hemangiomas. Correlate with MRI from 10/22/2024 for additional details.                     Impression     65 year old male with a diagnosis of adenocarcinoma involving right lung with oligometastasis of the sternum, clinical stage T1 N0 M1. The patient received stereotactic radiosurgery for right lung and the sternum with a total dose of 4500 (sternum) and 5000 (lung) cGy in 5 treatments given from 12/6/2024 - 12/16/2024.  Restaging CT scan showed good response.  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    Assessment & Plan:     1.  The patient has been recovering well since radiation therapy with no ongoing issues.  He will continue his long-term follow-up and ongoing care for his lung cancer with Dr. Lux, medical oncology as planned.  Patient will be followed by radiation oncology as needed.     2.  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  Patient will continue to be followed by Dr. Srinivas Interiano, GI surgeon for management recommendation.    3.  Multinodular thyroid, including a 1.9 cm nodule in the right.  I will schedule patient to have thyroid ultrasound for evaluation.    Pain Management Plan: NA     Face to face time 30 minutes with > 80% spent on consultation, education and coordination of care.         Melanie Sommer MD  Department of Radiation Oncology   [facility]  Tel: [phone]  Page: [phone]    [facility]  [address] [place] [address]     [facility]   [address] [place] [address]    CC:  Patient Care Team:  System, Provider Not In as PCP - General (Clinic)  Brendon Loya MD as MD (Gastroenterology)  Ivy Gurrola PA-C as Physician Assistant (Anesthesiology)  Srinivas Interiano MD as Physician (Surgery)  Elizabeth Lux MD as MD (Medical Oncology)  Apryl Bailon RN as Specialty Care Coordinator (Hematology & Oncology)  Adi Zuluaga LP as Assigned Behavioral Health Provider  Srinivas Interiano MD as Assigned Surgical Provider  Melanie Sommer MD as MD (Radiation Oncology)  Elizabeth Lux MD as Assigned Cancer Care Provider

## 2025-07-14 NOTE — LETTER
7/14/2025      Walt Estrada  [address] [place] [address]      Dear Colleague,    Thank you for referring your patient, Walt Estrada, to the [facility]. Please see a copy of my visit note below.    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       TYPE OF RADIATION THERAPY ADMINISTERED:  Stereotactic radiosurgery for right lung and the sternum with a total dose of 4500 (sternum) and 5000 (lung) cGy in 5 treatments given from 12/6/2024 - 12/16/2024.      INTERVAL SINCE COMPLETION OF RADIATION THERAPY: 7 months.      SUBJECTIVE:  Mr. Estrada is a 65 year old male who has been in his usual state of health until recently.  The patient presented to [facility] in August with abdominal pain symptoms which was consistent with acute appendicitis. Had CT of the abdomen and pelvis at the time which showed signs of early mild acute appendicitis. There is also an incidentally noted 0.9 cm enhancing lesion adjacent to the pancreatic neck which is suspicious for a neuroendocrine tumor. He underwent a laparoscopic appendectomy and surgical path showed acute appendicitis without evidence of any malignancy, gangrene perforation or abscess. Further evaluation of the pancreatic lesion he underwent endoscopic ultrasound-guided biopsy twice with [facility] with nondiagnostic path. Finally it was concluded that he probably has a low-grade neuroendocrine tumor which by size criteria did not need any particular intervention. He had a CT of the chest in mid October for evaluation of right lung opacities seen on chest x-ray which showed a spiculated density in the right upper lobe measuring 3.1 cm x 1.4 cm x 1.4 cm suspicious for neoplasm. He underwent CT guided biopsy of this lung lesion  on 10/30/2024 with pathology showed non-small cell carcinoma most consistent with adenocarcinoma with acinar growth pattern.  MRI brain on 11/8/2024 was negative for brain metastasis.  Staging PET CT scan on 11/12/2024 showed FDG avid right upper lobe lesion along with FDG avid lower sternal destructive bone and soft tissue lesion consistent with oligometastatic disease.  Patient underwent CT-guided bone biopsy on 11/21/2024 this confirmed metastatic adenocarcinoma consistent with lung primary. PD-L1 TPS: Less than 1%. Patient did have a significant pain in the sternum (9/10 with cough).  Patient saw Dr. Lux, medical oncology and was recommended to proceed with definitive radiation therapy using SBRT for both lung and oligo metastasis of sternum followed by chemo-immunotherapy and immunotherapy maintenance. The patient received stereotactic radiosurgery for right lung and the sternum with a total dose of 4500 (sternum) and 5000 (lung) cGy in 5 treatments given from 12/6/2024 - 12/16/2024.  The patient tolerated ration therapy well with minimal side effect.      The patient has been recovering well since radiation therapy with no ongoing issues.  Patient had a restaging CT scan on 7/7/2025 and he is here for routine post therapy office follow-up.     Medications were reviewed and are up to date on EPIC.    The following portions of the patient's history were reviewed and updated as appropriate: allergies, current medications, past family history, past medical history, past social history, past surgical history and problem list.    Review of Systems:      General  Constitutional  Constitutional (WDL): Exceptions to WDL  Fatigue: Fatigue relieved by rest  EENT  Eye Disorders  Eye Disorder (WDL): All eye disorder elements are within defined limits  Ear Disorders  Ear Disorder (WDL): All ear disorder elements are within defined limits  Respiratory  Respiratory  Respiratory (WDL): Exceptions to WDL  Dyspnea: Shortness  of breath with moderate exertion  Cardiovascular  Cardiovascular  Cardiovascular (WDL): All cardiovascular elements are within defined limits  Gastrointestinal  Gastrointestinal  Gastrointestinal (WDL): All gastrointestinal elements are within defined limits  Musculoskeletal  Musculoskeletal and Connective Tissue Disorders  Musculoskeletal & Connective (WDL): Exceptions to WDL  Arthralgia: Mild pain  Integumentary  Integumentary  Integumentary (WDL): All integumentary elements are within defined limits  Neurological  Neurosensory  Neurosensory (WDL): Exceptions to WDL  Peripheral Sensory Neuropathy: Asymptomatic  Genitourinary/Reproductive  Genitourinary  Genitourinary (WDL): All genitourinary elements are within defined limits  Lymphatic  Lymph System Disorders  Lymph (WDL): All lymph elements are within defined limits  Pain  Pain Score: Mild Pain (3)  AUA Assessment                                                              Accompanied by  Accompanied By: spouse    Objective:      PHYSICAL EXAMINATION:    /84   Pulse 80   Resp 16   Wt 103.6 kg (228 lb 6.4 oz)   SpO2 95%   BMI 31.86 kg/m      Gen: Alert, in NAD  Eyes: PERRL, EOMI, sclera anicteric  Neck: Supple, full ROM, no LAD  Pulm: No wheezing, stridor or respiratory distress  CV: Well-perfused, no cyanosis, no pedal edema  Back: No step-offs or pain to palpation along the thoracolumbar spine  Rectal: Deferred  : Deferred  Musculoskeletal: Normal muscle bulk and tone  Skin: Normal color and turgor  Neurologic: A/Ox3, CN II-XII intact, normal gait and station  Psychiatric: Appropriate mood and affect     Imaging: Imaging results 30 days: PET Dotatate Eyes to Thighs  Result Date: 7/10/2025  EXAM: PET DOTATATE EYES TO THIGHS, CT CHEST/ABDOMEN/PELVIS W CONTRAST LOCATION: [facility] DATE: 7/10/2025 INDICATION: Abnormal findings on diagnostic imaging of other abdominal regions, including retroperitoneum. COMPARISON: 7/7/2025,  2/12/2025, 11/12/2024 CONTRAST: 114 mL Isovue-370 IV TECHNIQUE: 4.23mCi Cu64 Detectnet mCi Cu 64 dotatate was administered intravenously to the patient. One hour post intravenous administration, PET imaging was performed from the skull vertex to mid thigh, utilizing attenuation correction with concurrent axial CT and PET/CT image fusion. Separate diagnostic CT of the chest, abdomen, and pelvis was performed. Dose reduction techniques were used. PET/CT FINDINGS: Intensely enhancing mass within the pancreatic head measuring 1.1 x 1.1 x 1.0 cm with associated radiotracer uptake, consistent with primary pancreatic neuroendocrine tumor. Minimally avid posttreatment irregular consolidative opacity within the suprahilar right upper lobe. Physiologic radiotracer uptake associated with some presumed BPH nodules in the prostate transition zone. CT FINDINGS: Mild intracranial senescent changes. Moderate mucosal thickening in the paranasal sinuses. Mild carotid calcifications. Multinodular thyroid, including a 1.9 cm nodule in the right. Severe coronary artery calcium. Right chest port catheter tip in the right atrium. Photopenic right hepatic lobe lesion measuring 1.7 cm with discontinuous peripheral nodular enhancement, consistent with a benign cavernous hemangioma (4/245). Additional 1.2 cm and 1.5 cm probable hemangiomas posterior to the aforementioned hemangioma (4/252) and in the more inferior right hepatic lobe (4/288), respectively. Calcified granulomas of the spleen. Simple renal cysts, which are benign and do not require follow-up. A few colonic diverticula. Infrarenal abdominal aortic aneurysm measuring up to 3 cm AP diameter. Circumferential urinary bladder wall thickening, possibly related to underdistention. Multilevel degenerative disease in the spine.     IMPRESSION: 1.  A 1.1 cm enhancing mass in the pancreatic head with associated radiotracer uptake is consistent with primary pancreatic neuroendocrine tumor  without metastasis. 2.  Hepatic hemangiomas. 3.  Multinodular thyroid, including a 1.9 cm nodule in the right. Recommend follow-up thyroid ultrasound. 4.  Circumferential urinary bladder wall thickening, possibly due to underdistention. Consider correlation with urinalysis to exclude cystitis.     CT Chest/Abdomen/Pelvis w Contrast  Result Date: 7/10/2025  EXAM: PET DOTATATE EYES TO THIGHS, CT CHEST/ABDOMEN/PELVIS W CONTRAST LOCATION: [facility] DATE: 7/10/2025 INDICATION: Abnormal findings on diagnostic imaging of other abdominal regions, including retroperitoneum. COMPARISON: 7/7/2025, 2/12/2025, 11/12/2024 CONTRAST: 114 mL Isovue-370 IV TECHNIQUE: 4.23mCi Cu64 Detectnet mCi Cu 64 dotatate was administered intravenously to the patient. One hour post intravenous administration, PET imaging was performed from the skull vertex to mid thigh, utilizing attenuation correction with concurrent axial CT and PET/CT image fusion. Separate diagnostic CT of the chest, abdomen, and pelvis was performed. Dose reduction techniques were used. PET/CT FINDINGS: Intensely enhancing mass within the pancreatic head measuring 1.1 x 1.1 x 1.0 cm with associated radiotracer uptake, consistent with primary pancreatic neuroendocrine tumor. Minimally avid posttreatment irregular consolidative opacity within the suprahilar right upper lobe. Physiologic radiotracer uptake associated with some presumed BPH nodules in the prostate transition zone. CT FINDINGS: Mild intracranial senescent changes. Moderate mucosal thickening in the paranasal sinuses. Mild carotid calcifications. Multinodular thyroid, including a 1.9 cm nodule in the right. Severe coronary artery calcium. Right chest port catheter tip in the right atrium. Photopenic right hepatic lobe lesion measuring 1.7 cm with discontinuous peripheral nodular enhancement, consistent with a benign cavernous hemangioma (4/245). Additional 1.2 cm and 1.5 cm probable  hemangiomas posterior to the aforementioned hemangioma (4/252) and in the more inferior right hepatic lobe (4/288), respectively. Calcified granulomas of the spleen. Simple renal cysts, which are benign and do not require follow-up. A few colonic diverticula. Infrarenal abdominal aortic aneurysm measuring up to 3 cm AP diameter. Circumferential urinary bladder wall thickening, possibly related to underdistention. Multilevel degenerative disease in the spine.     IMPRESSION: 1.  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 2.  Hepatic hemangiomas. 3.  Multinodular thyroid, including a 1.9 cm nodule in the right. Recommend follow-up thyroid ultrasound. 4.  Circumferential urinary bladder wall thickening, possibly due to underdistention. Consider correlation with urinalysis to exclude cystitis.     CT Chest w Contrast  Result Date: 7/8/2025  EXAM: CT CHEST W CONTRAST LOCATION: [facility] DATE: 7/7/2025 INDICATION: Malignant neoplasm metastatic to bone (H). Upper respiratory tract infection, unspecified type, malignant neoplasm of upper lobe of right lung (H). SOB (shortness of breath). COMPARISON: CT 6/5/2025, MRI 10/22/2024. TECHNIQUE: CT chest with IV contrast. Multiplanar reformats were obtained. Dose reduction techniques were used. CONTRAST: 81 mL Isovue-370 IV. FINDINGS: LUNGS AND PLEURA: No effusion. Right upper lobe triangular irregular consolidation is slightly smaller measuring 6.1 x 3.5 cm, previously 6.4 x 3.5 cm, series 4 image 107. This leads towards the right hilum. No new focal airspace disease identified. MEDIASTINUM/AXILLAE: No acute abnormality. No adenopathy. Stable right thyroid nodule that may be further evaluated with ultrasound as needed. Right chest Port-A-Cath. CORONARY ARTERY CALCIFICATION: Moderate. UPPER ABDOMEN: Small enhancing observation at  the right hepatic dome is stable suggestive of a hemangioma series 3 image 118. There are other enhancing observations within the liver that may be additional hemangiomas. Correlate with MRI from 10/22/2024 for additional details. Enhancing nodule at the pancreas head is stable and indeterminate measuring 0.8 cm series 3 image 171. No acute upper abdominal abnormality. A few renal cysts noted without specific imaging follow-up recommended. MUSCULOSKELETAL: Degenerative changes of the spine. Expansile bone lesion at the lower sternum with sclerosis appears stable. In transverse plane this is 2.7 x 2.3 cm series 3 image 93. Stable lucency at T12 that is 0.9 cm image 150. Stable sclerosis at T7 is 0.6 cm series 8 image 79. No new bone lesion identified.     IMPRESSION: 1.  Stable versus slightly smaller irregular consolidation at the right upper lobe. No new airspace disease identified. 2.  Stable bone lesions identified at the sternum and lower thoracic spine. No new bone lesion identified. 3.  Stable enhancing nodule at the pancreas head. This may represent a neuroendocrine tumor. Stable enhancing observations within the liver that may be hemangiomas. Correlate with MRI from 10/22/2024 for additional details.                     Impression     65 year old male with a diagnosis of adenocarcinoma involving right lung with oligometastasis of the sternum, clinical stage T1 N0 M1. The patient received stereotactic radiosurgery for right lung and the sternum with a total dose of 4500 (sternum) and 5000 (lung) cGy in 5 treatments given from 12/6/2024 - 12/16/2024.  Restaging CT scan showed good response.  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    Assessment & Plan:     1.  The patient has been recovering well since radiation therapy with no ongoing issues.  He will continue his long-term follow-up and ongoing  care for his lung cancer with Dr. Lux, medical oncology as planned.  Patient will be followed by radiation oncology as needed.     2.  The PET DOTATATE on 7/10/2025 showed 1.1 cm enhancing mass in the pancreatic head with associated radiotracer uptake is consistent with primary pancreatic neuroendocrine tumor without metastasis.  Patient will continue to be followed by Dr. Srinivas Interiano, GI surgeon for management recommendation.    3.  Multinodular thyroid, including a 1.9 cm nodule in the right.  I will schedule patient to have thyroid ultrasound for evaluation.    Pain Management Plan: NA     Face to face time 30 minutes with > 80% spent on consultation, education and coordination of care.         Melanie Sommer MD  Department of Radiation Oncology   [facility]iation [facility]  Tel: [phone]  Page: [phone]    [facility]  [address] [place] [address]     [address]   [place] [address]    CC:  Patient Care Team:  System, Provider Not In as PCP - General (Clinic)  Brendon Loya MD as MD (Gastroenterology)  Ivy Gurrola PA-C as Physician Assistant (Anesthesiology)  Srinivas Interiano MD as Physician (Surgery)  Elizabeth Lux MD as MD (Medical Oncology)  Apryl Bailon, RN as Specialty Care Coordinator (Hematology & Oncology)  Adi Zuluaga LP as Assigned Behavioral Health Provider  Srinivas Interiano MD as Assigned Surgical Provider  Melanie Sommer MD as MD (Radiation Oncology)  Elizabeth Lux MD as Assigned Cancer Care Provider      Oncology Rooming Note    July 14, 2025 11:10 AM   Walt Estrada is a 65 year old male who presents for:    Chief Complaint   Patient presents with     Oncology Clinic Visit     Rad Onc follow up     Initial Vitals: /84   Pulse 80   Resp 16   Wt 103.6 kg (228 lb 6.4 oz)   SpO2 95%   BMI 31.86 kg/m   Estimated body mass index is 31.86 kg/m  as calculated from the following:    Height as  "of 6/18/25: 1.803 m (5' 11\").    Weight as of this encounter: 103.6 kg (228 lb 6.4 oz). Body surface area is 2.28 meters squared.  No Pain (0) Comment: Data Unavailable   No LMP for male patient.  Allergies reviewed: Yes  Medications reviewed: Yes    Medications: Medication refills not needed today.  Pharmacy name entered into EPIC:    [facility] [address] [facility] [place], [place] - [address] [id number] - [place], [place] - [address] [facility] [address] [place] [id number] [facility] - [place], [place] - [address] [place] [address] [facility]ACY [place] - [place], [place] - [address] [facility]    PHQ9:  Did this patient require a PHQ9?: No      Clinical concerns: Neuropathy in hands, fatigue, mild SOB, occasional/rare cough.  Dr. Sommer was notified.      Adela Shin RN                Again, thank you for allowing me to participate in the care of your patient.        Sincerely,        Melanie Sommer MD    Electronically signed"

## 2025-07-18 ENCOUNTER — HOSPITAL ENCOUNTER (OUTPATIENT)
Dept: ULTRASOUND IMAGING | Facility: HOSPITAL | Age: 65
Discharge: HOME | End: 2025-07-18
Attending: RADIOLOGY | Admitting: RADIOLOGY
Payer: MEDICARE

## 2025-07-18 DIAGNOSIS — E07.9 THYROID MASS: ICD-10-CM

## 2025-07-18 PROCEDURE — 76536 US EXAM OF HEAD AND NECK: CPT

## 2025-07-23 ENCOUNTER — ONCOLOGY VISIT (OUTPATIENT)
Dept: ONCOLOGY | Facility: HOSPITAL | Age: 65
End: 2025-07-23
Payer: MEDICARE

## 2025-07-23 ENCOUNTER — PATIENT OUTREACH (OUTPATIENT)
Dept: ONCOLOGY | Facility: HOSPITAL | Age: 65
End: 2025-07-23

## 2025-07-23 ENCOUNTER — INFUSION THERAPY VISIT (OUTPATIENT)
Dept: INFUSION THERAPY | Facility: HOSPITAL | Age: 65
End: 2025-07-23
Payer: MEDICARE

## 2025-07-23 VITALS
RESPIRATION RATE: 18 BRPM | HEIGHT: 71 IN | DIASTOLIC BLOOD PRESSURE: 81 MMHG | WEIGHT: 231.5 LBS | BODY MASS INDEX: 32.41 KG/M2 | OXYGEN SATURATION: 95 % | SYSTOLIC BLOOD PRESSURE: 142 MMHG | TEMPERATURE: 98.7 F | HEART RATE: 74 BPM

## 2025-07-23 DIAGNOSIS — C34.11 MALIGNANT NEOPLASM OF UPPER LOBE OF RIGHT LUNG (H): Primary | ICD-10-CM

## 2025-07-23 DIAGNOSIS — G62.9 PERIPHERAL POLYNEUROPATHY: ICD-10-CM

## 2025-07-23 DIAGNOSIS — C79.51 MALIGNANT NEOPLASM METASTATIC TO BONE (H): ICD-10-CM

## 2025-07-23 DIAGNOSIS — R73.09 OTHER ABNORMAL GLUCOSE: ICD-10-CM

## 2025-07-23 DIAGNOSIS — C34.11 MALIGNANT NEOPLASM OF UPPER LOBE OF RIGHT LUNG (H): ICD-10-CM

## 2025-07-23 DIAGNOSIS — M25.50 MULTIPLE JOINT PAIN: ICD-10-CM

## 2025-07-23 DIAGNOSIS — C79.51 MALIGNANT NEOPLASM METASTATIC TO BONE (H): Primary | ICD-10-CM

## 2025-07-23 DIAGNOSIS — D3A.8 NEUROENDOCRINE TUMOR OF PANCREAS (H): ICD-10-CM

## 2025-07-23 LAB
ALBUMIN SERPL BCG-MCNC: 4.1 G/DL (ref 3.5–5.2)
ALP SERPL-CCNC: 76 U/L (ref 40–150)
ALT SERPL W P-5'-P-CCNC: 23 U/L (ref 0–70)
ANION GAP SERPL CALCULATED.3IONS-SCNC: 10 MMOL/L (ref 7–15)
AST SERPL W P-5'-P-CCNC: 27 U/L (ref 0–45)
BILIRUB SERPL-MCNC: 0.3 MG/DL
BUN SERPL-MCNC: 15.6 MG/DL (ref 8–23)
CALCIUM SERPL-MCNC: 9.4 MG/DL (ref 8.8–10.4)
CHLORIDE SERPL-SCNC: 106 MMOL/L (ref 98–107)
CREAT SERPL-MCNC: 0.77 MG/DL (ref 0.67–1.17)
CRP SERPL-MCNC: 3.8 MG/L
EGFRCR SERPLBLD CKD-EPI 2021: >90 ML/MIN/1.73M2
ERYTHROCYTE [SEDIMENTATION RATE] IN BLOOD BY WESTERGREN METHOD: 13 MM/HR (ref 0–20)
EST. AVERAGE GLUCOSE BLD GHB EST-MCNC: 114 MG/DL
GLUCOSE SERPL-MCNC: 119 MG/DL (ref 70–99)
HBA1C MFR BLD: 5.6 %
HCO3 SERPL-SCNC: 24 MMOL/L (ref 22–29)
POTASSIUM SERPL-SCNC: 4 MMOL/L (ref 3.4–5.3)
PROT SERPL-MCNC: 6.6 G/DL (ref 6.4–8.3)
RHEUMATOID FACT SERPL-ACNC: <10 IU/ML
SODIUM SERPL-SCNC: 140 MMOL/L (ref 135–145)
TSH SERPL DL<=0.005 MIU/L-ACNC: 1.53 UIU/ML (ref 0.3–4.2)

## 2025-07-23 PROCEDURE — 250N000011 HC RX IP 250 OP 636

## 2025-07-23 PROCEDURE — 36591 DRAW BLOOD OFF VENOUS DEVICE: CPT | Performed by: NURSE PRACTITIONER

## 2025-07-23 PROCEDURE — G0463 HOSPITAL OUTPT CLINIC VISIT: HCPCS | Performed by: INTERNAL MEDICINE

## 2025-07-23 PROCEDURE — 86140 C-REACTIVE PROTEIN: CPT

## 2025-07-23 PROCEDURE — 99215 OFFICE O/P EST HI 40 MIN: CPT | Performed by: INTERNAL MEDICINE

## 2025-07-23 PROCEDURE — 86200 CCP ANTIBODY: CPT

## 2025-07-23 PROCEDURE — 86255 FLUORESCENT ANTIBODY SCREEN: CPT

## 2025-07-23 PROCEDURE — 82310 ASSAY OF CALCIUM: CPT | Performed by: NURSE PRACTITIONER

## 2025-07-23 PROCEDURE — 36591 DRAW BLOOD OFF VENOUS DEVICE: CPT

## 2025-07-23 PROCEDURE — 85652 RBC SED RATE AUTOMATED: CPT

## 2025-07-23 PROCEDURE — 86431 RHEUMATOID FACTOR QUANT: CPT

## 2025-07-23 PROCEDURE — 83036 HEMOGLOBIN GLYCOSYLATED A1C: CPT

## 2025-07-23 PROCEDURE — G2211 COMPLEX E/M VISIT ADD ON: HCPCS | Performed by: INTERNAL MEDICINE

## 2025-07-23 PROCEDURE — 84443 ASSAY THYROID STIM HORMONE: CPT | Performed by: NURSE PRACTITIONER

## 2025-07-23 RX ORDER — HEPARIN SODIUM (PORCINE) LOCK FLUSH IV SOLN 100 UNIT/ML 100 UNIT/ML
SOLUTION INTRAVENOUS
Status: COMPLETED
Start: 2025-07-23 | End: 2025-07-23

## 2025-07-23 RX ORDER — HEPARIN SODIUM (PORCINE) LOCK FLUSH IV SOLN 100 UNIT/ML 100 UNIT/ML
5 SOLUTION INTRAVENOUS
Status: DISCONTINUED | OUTPATIENT
Start: 2025-07-23 | End: 2025-07-23 | Stop reason: HOSPADM

## 2025-07-23 RX ORDER — HEPARIN SODIUM (PORCINE) LOCK FLUSH IV SOLN 100 UNIT/ML 100 UNIT/ML
5 SOLUTION INTRAVENOUS
OUTPATIENT
Start: 2025-07-23

## 2025-07-23 RX ADMIN — HEPARIN SODIUM (PORCINE) LOCK FLUSH IV SOLN 100 UNIT/ML 5 ML: 100 SOLUTION at 09:32

## 2025-07-23 RX ADMIN — Medication 5 ML: at 09:32

## 2025-07-23 ASSESSMENT — PAIN SCALES - GENERAL: PAINLEVEL_OUTOF10: NO PAIN (0)

## 2025-07-23 NOTE — PROGRESS NOTES
Additional labs drawn per Dr. Lux. Patient is choosing to leave and reschedule, as he has other appointments today and can not wait for the lab results.

## 2025-07-23 NOTE — PROGRESS NOTES
Situation: Patient chart reviewed by care coordinator.    Background:   lung cancer in clinic to see Dr. Lux for follow up and treatment.   Lab has technical problem today and all CMPs are being sent over to [facility]. This delays treatment.    Assessment:   Dr. Lux ordered additional blood tests. Will reschedule [facility]tr[facility] when labs are available.     Plan/Recommendations:   Reschedule.     Apryl Bailon RN

## 2025-07-23 NOTE — LETTER
"7/23/2025      Walt Estrada  [address] [place] [address]      Dear Colleague,    Thank you for referring your patient, Walt Estrada, to the [facility]. Please see a copy of my visit note below.    Oncology Rooming Note    July 23, 2025 8:22 AM   Walt Estrada is a 65 year old male who presents for:    Chief Complaint   Patient presents with     Oncology Clinic Visit     Malignant neoplasm of upper lobe of right lung (H)     Initial Vitals: BP (!) 142/81   Pulse 74   Temp 98.7  F (37.1  C)   Resp 18   Ht 1.803 m (5' 11\")   Wt 105 kg (231 lb 8 oz)   SpO2 95%   BMI 32.29 kg/m   Estimated body mass index is 32.29 kg/m  as calculated from the following:    Height as of this encounter: 1.803 m (5' 11\").    Weight as of this encounter: 105 kg (231 lb 8 oz). Body surface area is 2.29 meters squared.  No Pain (0) Comment: Data Unavailable   No LMP for male patient.  Allergies reviewed: Yes  Medications reviewed: Yes    Medications: Medication refills not needed today.  Pharmacy name entered into EPIC:    [facility] - [facility], [place] - [address] [facility] [place] [place] - [address] [address] [id number] [address] [place] [id number] [place], [place] - [address] [place] [address] PHARMACY [place], [place] - [address] [place] [address]    PHQ9:  Did this patient require a PHQ9?: No      Clinical concerns: None      Jennifer Larsen LPN               [facility] Hematology and Oncology Progress Note    Patient: Walt Estrada  MRN: [id number]  Date of Service: Jul 23, 2025          Reason for Visit    Chief Complaint   Patient presents with     Oncology Clinic Visit     Malignant neoplasm of upper lobe of right lung (H)       Assessment and Plan     Cancer Staging   Malignant neoplasm of upper lobe of right lung (H)  Staging form: Lung, AJCC 8th Edition  - " Clinical: Stage IV (cTX, cNX, cM1) - Signed by Elizabeth Lux MD on 11/29/2024    Lung cancer, stage IV with an oligometastatic site in the sternum  PD-L1 less than 1%  NGS: No actionable mutations, MET [id number] alteration of unknown significance noted, pathogenic p53 mutation noted  TMB: 11.62 mutations per megabase.  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  His pain is improved.  He initially had 4 cycles of triplet therapy and now is on maintenance Keytruda due to having minimal disease as well as high tumor mutation burden.  Started maintenance Keytruda on 4/7/2025 at [place].  Has been receiving it since then either on a 3-week schedule or 6-week schedule.  Recently switched to 3-week schedule.      He continues to have some shortness of breath. And also complains of neuropathy in his fingertips.  This has been there for the past couple of months.  Also complains of significant muscle aches and joint pains and stiffness in the morning.  He also had ultrasound of his thyroid gland recently for thyroid nodules.      At the time of his initial diagnosis of lung cancer he was also diagnosed with pancreatic neuroendocrine tumor, which he had been following with periodic scans.  He recently had a PET dotatate.  Here to review the results.    Results  - Neuroendocrine tumor in the pancreas confirmed, size between 0.7 to 1 cm, considered slow-growing.  - Thyroid ultrasound shows bilateral nodules classified as TI-RADS 1, indicating benign nature, not meeting criteria for biopsy or follow-up.  - PET scan did not show activity in the thoracic spine scarring, suggesting it is not active.  - CT scan shows scarring in the lungs post-radiation, no new airspace disease detected.  - Calcification of arteries around the heart noted on the first PET scan, degree of calcification not specified.  -  Previous TSH normal.  Previous chemistry within normal limits.  - I reviewed lab results personally and independently interpreted the results.  - I reviewed CT/PET scan images and report personally and independently interpreted the results.     Plan  Malignant neoplasm of upper lobe of right lung:  Post-radiation scarring observed, with no new airspace disease. The scarring is expected and not indicative of active cancer.  Continue Keytruda on a 3 weekly basis.  Looks like he has had significant side effects when he was on 6 weekly regimen.  Mainly musculoskeletal pain and stiffness.  But feels better after switching to 3 weekly regimen.  Continue monitoring with CT scans every 3 months to assess for any changes.    Malignant neoplasm metastatic to bone:  Sclerosis observed in the sternum, consistent with healing post-treatment. No new lesions detected.  He does have a T7 thoracic spine sclerotic lesion measuring 6 mm.  This was also present in the past.  But not active on the previous PET scan.  Will continue to monitor this.  Continue monitoring for any changes in bone lesions.    Neuroendocrine tumor of pancreas:  Slow-growing neuroendocrine tumor confirmed, less than 2 cm, not requiring surgical intervention at this time.  Continue surveillance and monitoring of tumor size.  Clinical data indicate that small, asymptomatic neuroendocrine tumors can be safely monitored without immediate surgery, as prognosis is generally favorable for tumors under 2 cm.    Peripheral polyneuropathy:  Neuropathy in fingertips.  Etiology is not clear.  Typically carboplatin does not cause neuropathy.  Potentially could be due to Keytruda induced immune mediated neuropathy.  Other potential causes also include diabetic polyneuropathy, carpal tunnel syndrome, paraneoplastic syndrome etc.  Will obtain hemoglobin A1c and paraneoplastic panel to investigate neuropathy further. Consider referral to a specialist if needed.    Other  abnormal glucose:  Weight gain  Referral to a dietitian for dietary management and weight loss strategies.    Multiple joint pain:  Joint and muscle pain noted, possibly related inflammatory arthritis which can be again drug-induced by Keytruda.  I will check CRP, ESR and ordered factor levels. Consider further evaluation if symptoms persist.     ECOG Performance    0 - Independent    Distress Screening (within last 30 days)    1. How concerned are you about your ability to eat? : 2  2. How concerned are you about unintended weight loss or your current weight? : 0  3. How concerned are you about feeling depressed or very sad? : 0  4. How concerned are you about feeling anxious or very scared? : 0  5. Do you struggle with the loss of meaning and travon in your life? : Not at all  6. How concerned are you about work and home life issues that may be affected by your cancer? : 0  7. How concerned are you about knowing what resources are available to help you? : 0  8. Do you currently have what you would describe as Uatsdin or spiritual struggles?: Not at all       Pain  Pain Score: No Pain (0)    Problem List    Patient Active Problem List   Diagnosis     Malignant neoplasm metastatic to bone (H)     Malignant neoplasm of upper lobe of right lung (H)     Neuroendocrine tumor of pancreas (H)     Drug-induced hypothyroidism        ______________________________________________________________________________    History of Present Illness    Oncologist: Dr. Lux    Diagnosis: Lung cancer, adenocarcinoma. Stage IV with solitary lung nodule and sternal met.   -11/12/24: Done that shows findings suspicious for right upper lobe primary lung neoplasm with isolated sternal mets.  Also was a subtle FDG avid soft tissue nodule, 8 mm in the pancreatic head.  Suggestive of a pancreatic neuroendocrine neoplasm.  - 10/30/2024: Patient had a right lung biopsy that did confirm non-small cell lung cancer consistent with adenocarcinoma,  predominantly acinar growth pattern.  Less than 1%.  - 11/21/2024: Patient had biopsy of sternal lesion that did confirm metastatic moderately to poorly differentiated pulmonary adenocarcinoma.  PD-L1 showed a CPS score of 5.  NGS: No actionable mutations, MET [id number]>T alteration of unknown significance noted, pathogenic p53 mutation noted  TMB: 11.62 mutations per megabase.  (On the sternal mass biopsy specimen)    Treatment:   -12/6/24-12/16/24: Initially had radiation to right lung lesions and sternum  -1/3/25-3/7/25: Carboplatin, Alimta, Keytruda. Had 4 cycles.   -started maintenance Keytruda in [place] on 4/7/2025. .     Interim History:   Currently on Keytruda maintenance.    He reported ongoing generalized muscle and joint soreness, described as making it hard to get up and move, though symptoms improve somewhat with activity. He noted that these symptoms were worse when on a six-week dosing schedule of his medication, but have lessened since reducing to a three-week schedule, though they persist at a lower intensity. He described the pain as not severe enough to stop activities, but noticeable.    He also reported persistent neuropathy in the fingertips of both hands, with symptoms ranging from numbness to pain, sometimes varying in intensity. He stated that the neuropathy began during his time in [place], dating back to the period when he was receiving chemotherapy. He clarified that the neuropathy is limited to the fingertips and does not affect his feet. He denied symptoms of carpal tunnel syndrome, though he experiences some discomfort in his hands when using his phone for extended periods. He mentioned that a physician in [place] recommended [place] fish oil, B complex, and another supplement for the neuropathy.    He discussed a history of thyroid nodules, for which he underwent an ultrasound.     Walt recalled that his initial PET scan revealed calcification of the arteries around the heart,  "which was not addressed at the time due to ongoing chemotherapy and radiation. He denied having a cardiologist or any prior cardiac follow-up. He expressed concern about the implications of arterial calcification, asking about potential interventions such as bypass or stenting, and inquired about the significance of calcification and its relationship to arterial stiffness and plaque formation.    Walt described ongoing shortness of breath, particularly with exertion such as walking or climbing stairs. He stated that he does not experience acute episodes of struggling to breathe, but rather needs to stop and catch his breath before continuing. He questioned whether this symptom is related to cardiac issues or residual effects from lung scarring, and noted that he has a primary care physician in [place].    He reported efforts to lose weight, including consuming protein shakes and bars for breakfast and lunch, but stated that he has not experienced any weight loss despite these changes. He expressed concern that his lack of weight loss might be related to thyroid function.        Review of Systems    Pertinent items are noted in HPI.    Past History    Past Medical History:   Diagnosis Date     BCC (basal cell carcinoma), face      Dyslipidemia      Essential hypertension      GERD (gastroesophageal reflux disease)        PHYSICAL EXAM  BP (!) 142/81   Pulse 74   Temp 98.7  F (37.1  C)   Resp 18   Ht 1.803 m (5' 11\")   Wt 105 kg (231 lb 8 oz)   SpO2 95%   BMI 32.29 kg/m      GENERAL: no acute distress. Cooperative in conversation. Here with wife and son  RESP: Regular respiratory rate   NEURO: non focal. Alert and oriented x3.   PSYCH: within normal limits. No depression or anxiety.  SKIN: exposed skin is dry intact.     Lab Results    Recent Results (from the past week)   Comprehensive metabolic panel   Result Value Ref Range    Sodium 140 135 - 145 mmol/L    Potassium 4.0 3.4 - 5.3 mmol/L    Carbon Dioxide " (CO2) 24 22 - 29 mmol/L    Anion Gap 10 7 - 15 mmol/L    Urea Nitrogen 15.6 8.0 - 23.0 mg/dL    Creatinine 0.77 0.67 - 1.17 mg/dL    GFR Estimate >90 >60 mL/min/1.73m2    Calcium 9.4 8.8 - 10.4 mg/dL    Chloride 106 98 - 107 mmol/L    Glucose 119 (H) 70 - 99 mg/dL    Alkaline Phosphatase 76 40 - 150 U/L    AST 27 0 - 45 U/L    ALT 23 0 - 70 U/L    Protein Total 6.6 6.4 - 8.3 g/dL    Albumin 4.1 3.5 - 5.2 g/dL    Bilirubin Total 0.3 <=1.2 mg/dL   TSH with free T4 reflex   Result Value Ref Range    TSH 1.53 0.30 - 4.20 uIU/mL   CRP, inflammation   Result Value Ref Range    CRP Inflammation 3.80 <5.00 mg/L   ESR: Erythrocyte sedimentation rate   Result Value Ref Range    Erythrocyte Sedimentation Rate 13 0 - 20 mm/hr   Hemoglobin A1c   Result Value Ref Range    Estimated Average Glucose 114 <117 mg/dL    Hemoglobin A1C 5.6 <5.7 %         Imaging    US Thyroid  Result Date: 7/18/2025  EXAM: US THYROID LOCATION: [facility] DATE: 7/18/2025 INDICATION:  Thyroid mass COMPARISON: PET scan from 7/10/2025 TECHNIQUE: Thyroid ultrasound. FINDINGS: RIGHT lobe: 4.1 x 2.2 x 2.1 cm. Homogeneous echotexture. Isthmus: 4 mm. LEFT lobe: 3.7 x 1.5 x 1.4 cm. Homogeneous echotexture. NECK: No cervical lymphadenopathy. NODULES: Nodule 1: Right mid thyroid nodule measures 2.5 x 1.9 x 1.6 cm. Composition: Cystic or almost completely cystic, 0 points Echogenicity: Anechoic, 0 points Shape: Wider-than-tall, 0 points Margin: Smooth, 0 points Echogenic Foci: None, or large comet-tail artifacts, 0 points Point Total: 0 points. TI-RADS 1. No FNA.   Nodule 2: Left lower pole nodule measures 0.5 x 0.5 x 0.5 cm. Composition: Cystic or almost completely cystic, 0 points Echogenicity: Anechoic, 0 points Shape: Wider-than-tall, 0 points Margin: Smooth, 0 points Echogenic Foci: None, or large comet-tail artifacts, 0 points Point Total: 0 points. TI-RADS 1. No FNA.     IMPRESSION: 1.  Bilateral TI RADS 1 nodules do not meet  criteria for FNA or follow-up. Nodules are characterized per ACR Thyroid Imaging, Reporting and Data System (TI-RADS): White Paper of the ACR TI-RADS Committee Christian Trujillo et al. Journal of the American College of Radiology 2017. Volume 14 (2017), Issue 5, 713-769.     PET Dotatate Eyes to Thighs  Result Date: 7/10/2025  EXAM: PET DOTATATE EYES TO THIGHS, CT CHEST/ABDOMEN/PELVIS W CONTRAST LOCATION: [facility] DATE: 7/10/2025 INDICATION: Abnormal findings on diagnostic imaging of other abdominal regions, including retroperitoneum. COMPARISON: 7/7/2025, 2/12/2025, 11/12/2024 CONTRAST: 114 mL Isovue-370 IV TECHNIQUE: 4.23mCi Cu64 Detectnet mCi Cu 64 dotatate was administered intravenously to the patient. One hour post intravenous administration, PET imaging was performed from the skull vertex to mid thigh, utilizing attenuation correction with concurrent axial CT and PET/CT image fusion. Separate diagnostic CT of the chest, abdomen, and pelvis was performed. Dose reduction techniques were used. PET/CT FINDINGS: Intensely enhancing mass within the pancreatic head measuring 1.1 x 1.1 x 1.0 cm with associated radiotracer uptake, consistent with primary pancreatic neuroendocrine tumor. Minimally avid posttreatment irregular consolidative opacity within the suprahilar right upper lobe. Physiologic radiotracer uptake associated with some presumed BPH nodules in the prostate transition zone. CT FINDINGS: Mild intracranial senescent changes. Moderate mucosal thickening in the paranasal sinuses. Mild carotid calcifications. Multinodular thyroid, including a 1.9 cm nodule in the right. Severe coronary artery calcium. Right chest port catheter tip in the right atrium. Photopenic right hepatic lobe lesion measuring 1.7 cm with discontinuous peripheral nodular enhancement, consistent with a benign cavernous hemangioma (4/245). Additional 1.2 cm and 1.5 cm probable hemangiomas posterior to the  aforementioned hemangioma (4/252) and in the more inferior right hepatic lobe (4/288), respectively. Calcified granulomas of the spleen. Simple renal cysts, which are benign and do not require follow-up. A few colonic diverticula. Infrarenal abdominal aortic aneurysm measuring up to 3 cm AP diameter. Circumferential urinary bladder wall thickening, possibly related to underdistention. Multilevel degenerative disease in the spine.     IMPRESSION: 1.  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 2.  Hepatic hemangiomas. 3.  Multinodular thyroid, including a 1.9 cm nodule in the right. Recommend follow-up thyroid ultrasound. 4.  Circumferential urinary bladder wall thickening, possibly due to underdistention. Consider correlation with urinalysis to exclude cystitis.     CT Chest/Abdomen/Pelvis w Contrast  Result Date: 7/10/2025  EXAM: PET DOTATATE EYES TO THIGHS, CT CHEST/ABDOMEN/PELVIS W CONTRAST LOCATION: [facility] DATE: 7/10/2025 INDICATION: Abnormal findings on diagnostic imaging of other abdominal regions, including retroperitoneum. COMPARISON: 7/7/2025, 2/12/2025, 11/12/2024 CONTRAST: 114 mL Isovue-370 IV TECHNIQUE: 4.23mCi Cu64 Detectnet mCi Cu 64 dotatate was administered intravenously to the patient. One hour post intravenous administration, PET imaging was performed from the skull vertex to mid thigh, utilizing attenuation correction with concurrent axial CT and PET/CT image fusion. Separate diagnostic CT of the chest, abdomen, and pelvis was performed. Dose reduction techniques were used. PET/CT FINDINGS: Intensely enhancing mass within the pancreatic head measuring 1.1 x 1.1 x 1.0 cm with associated radiotracer uptake, consistent with primary pancreatic neuroendocrine tumor. Minimally avid posttreatment irregular consolidative opacity within the suprahilar right upper  lobe. Physiologic radiotracer uptake associated with some presumed BPH nodules in the prostate transition zone. CT FINDINGS: Mild intracranial senescent changes. Moderate mucosal thickening in the paranasal sinuses. Mild carotid calcifications. Multinodular thyroid, including a 1.9 cm nodule in the right. Severe coronary artery calcium. Right chest port catheter tip in the right atrium. Photopenic right hepatic lobe lesion measuring 1.7 cm with discontinuous peripheral nodular enhancement, consistent with a benign cavernous hemangioma (4/245). Additional 1.2 cm and 1.5 cm probable hemangiomas posterior to the aforementioned hemangioma (4/252) and in the more inferior right hepatic lobe (4/288), respectively. Calcified granulomas of the spleen. Simple renal cysts, which are benign and do not require follow-up. A few colonic diverticula. Infrarenal abdominal aortic aneurysm measuring up to 3 cm AP diameter. Circumferential urinary bladder wall thickening, possibly related to underdistention. Multilevel degenerative disease in the spine.     IMPRESSION: 1.  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 2.  Hepatic hemangiomas. 3.  Multinodular thyroid, including a 1.9 cm nodule in the right. Recommend follow-up thyroid ultrasound. 4.  Circumferential urinary bladder wall thickening, possibly due to underdistention. Consider correlation with urinalysis to exclude cystitis.     CT Chest w Contrast  Result Date: 7/8/2025  EXAM: CT CHEST W CONTRAST LOCATION: [facility] DATE: 7/7/2025 INDICATION: Malignant neoplasm metastatic to bone (H). Upper respiratory tract infection, unspecified type, malignant neoplasm of upper lobe of right lung (H). SOB (shortness of breath). COMPARISON: CT 6/5/2025, MRI 10/22/2024. TECHNIQUE: CT chest with IV contrast. Multiplanar reformats were  obtained. Dose reduction techniques were used. CONTRAST: 81 mL Isovue-370 IV. FINDINGS: LUNGS AND PLEURA: No effusion. Right upper lobe triangular irregular consolidation is slightly smaller measuring 6.1 x 3.5 cm, previously 6.4 x 3.5 cm, series 4 image 107. This leads towards the right hilum. No new focal airspace disease identified. MEDIASTINUM/AXILLAE: No acute abnormality. No adenopathy. Stable right thyroid nodule that may be further evaluated with ultrasound as needed. Right chest Port-A-Cath. CORONARY ARTERY CALCIFICATION: Moderate. UPPER ABDOMEN: Small enhancing observation at the right hepatic dome is stable suggestive of a hemangioma series 3 image 118. There are other enhancing observations within the liver that may be additional hemangiomas. Correlate with MRI from 10/22/2024 for additional details. Enhancing nodule at the pancreas head is stable and indeterminate measuring 0.8 cm series 3 image 171. No acute upper abdominal abnormality. A few renal cysts noted without specific imaging follow-up recommended. MUSCULOSKELETAL: Degenerative changes of the spine. Expansile bone lesion at the lower sternum with sclerosis appears stable. In transverse plane this is 2.7 x 2.3 cm series 3 image 93. Stable lucency at T12 that is 0.9 cm image 150. Stable sclerosis at T7 is 0.6 cm series 8 image 79. No new bone lesion identified.     IMPRESSION: 1.  Stable versus slightly smaller irregular consolidation at the right upper lobe. No new airspace disease identified. 2.  Stable bone lesions identified at the sternum and lower thoracic spine. No new bone lesion identified. 3.  Stable enhancing nodule at the pancreas head. This may represent a neuroendocrine tumor. Stable enhancing observations within the liver that may be hemangiomas. Correlate with MRI from 10/22/2024 for additional details.    The longitudinal plan of care for the diagnosis(es)/condition(s) as documented were addressed during this visit. Due to the  added complexity in care, I will continue to support Walt in the subsequent management and with ongoing continuity of care.        Signed by: Elizabeth Lux MD    Again, thank you for allowing me to participate in the care of your patient.        Sincerely,        Elizabeth Lux MD    Electronically signed

## 2025-07-23 NOTE — PROGRESS NOTES
[facility] Hematology and Oncology Progress Note    Patient: Walt Estrada  MRN: [id number]  Date of Service: Jul 23, 2025          Reason for Visit    Chief Complaint   Patient presents with    Oncology Clinic Visit     Malignant neoplasm of upper lobe of right lung (H)       Assessment and Plan     Cancer Staging   Malignant neoplasm of upper lobe of right lung (H)  Staging form: Lung, AJCC 8th Edition  - Clinical: Stage IV (cTX, cNX, cM1) - Signed by Elizabeth Lux MD on 11/29/2024    Lung cancer, stage IV with an oligometastatic site in the sternum  PD-L1 less than 1%  NGS: No actionable mutations, MET [id number] alteration of unknown significance noted, pathogenic p53 mutation noted  TMB: 11.62 mutations per megabase.  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  His pain is improved.  He initially had 4 cycles of triplet therapy and now is on maintenance Keytruda due to having minimal disease as well as high tumor mutation burden.  Started maintenance Keytruda on 4/7/2025 at [place].  Has been receiving it since then either on a 3-week schedule or 6-week schedule.  Recently switched to 3-week schedule.      He continues to have some shortness of breath. And also complains of neuropathy in his fingertips.  This has been there for the past couple of months.  Also complains of significant muscle aches and joint pains and stiffness in the morning.  He also had ultrasound of his thyroid gland recently for thyroid nodules.      At the time of his initial diagnosis of lung cancer he was also diagnosed with pancreatic neuroendocrine tumor, which he had been following with periodic scans.  He recently had a PET dotatate.  Here to review the results.    Results  - Neuroendocrine tumor in the pancreas confirmed, size between 0.7 to 1 cm, considered slow-growing.  - Thyroid ultrasound  shows bilateral nodules classified as TI-RADS 1, indicating benign nature, not meeting criteria for biopsy or follow-up.  - PET scan did not show activity in the thoracic spine scarring, suggesting it is not active.  - CT scan shows scarring in the lungs post-radiation, no new airspace disease detected.  - Calcification of arteries around the heart noted on the first PET scan, degree of calcification not specified.  - Previous TSH normal.  Previous chemistry within normal limits.  - I reviewed lab results personally and independently interpreted the results.  - I reviewed CT/PET scan images and report personally and independently interpreted the results.     Plan  Malignant neoplasm of upper lobe of right lung:  Post-radiation scarring observed, with no new airspace disease. The scarring is expected and not indicative of active cancer.  Continue Keytruda on a 3 weekly basis.  Looks like he has had significant side effects when he was on 6 weekly regimen.  Mainly musculoskeletal pain and stiffness.  But feels better after switching to 3 weekly regimen.  Continue monitoring with CT scans every 3 months to assess for any changes.    Malignant neoplasm metastatic to bone:  Sclerosis observed in the sternum, consistent with healing post-treatment. No new lesions detected.  He does have a T7 thoracic spine sclerotic lesion measuring 6 mm.  This was also present in the past.  But not active on the previous PET scan.  Will continue to monitor this.  Continue monitoring for any changes in bone lesions.    Neuroendocrine tumor of pancreas:  Slow-growing neuroendocrine tumor confirmed, less than 2 cm, not requiring surgical intervention at this time.  Continue surveillance and monitoring of tumor size.  Clinical data indicate that small, asymptomatic neuroendocrine tumors can be safely monitored without immediate surgery, as prognosis is generally favorable for tumors under 2 cm.    Peripheral polyneuropathy:  Neuropathy in  fingertips.  Etiology is not clear.  Typically carboplatin does not cause neuropathy.  Potentially could be due to Keytruda induced immune mediated neuropathy.  Other potential causes also include diabetic polyneuropathy, carpal tunnel syndrome, paraneoplastic syndrome etc.  Will obtain hemoglobin A1c and paraneoplastic panel to investigate neuropathy further. Consider referral to a specialist if needed.    Other abnormal glucose:  Weight gain  Referral to a dietitian for dietary management and weight loss strategies.    Multiple joint pain:  Joint and muscle pain noted, possibly related inflammatory arthritis which can be again drug-induced by Keytruda.  I will check CRP, ESR and ordered factor levels. Consider further evaluation if symptoms persist.     ECOG Performance    0 - Independent    Distress Screening (within last 30 days)    1. How concerned are you about your ability to eat? : 2  2. How concerned are you about unintended weight loss or your current weight? : 0  3. How concerned are you about feeling depressed or very sad? : 0  4. How concerned are you about feeling anxious or very scared? : 0  5. Do you struggle with the loss of meaning and travon in your life? : Not at all  6. How concerned are you about work and home life issues that may be affected by your cancer? : 0  7. How concerned are you about knowing what resources are available to help you? : 0  8. Do you currently have what you would describe as Mormon or spiritual struggles?: Not at all       Pain  Pain Score: No Pain (0)    Problem List    Patient Active Problem List   Diagnosis    Malignant neoplasm metastatic to bone (H)    Malignant neoplasm of upper lobe of right lung (H)    Neuroendocrine tumor of pancreas (H)    Drug-induced hypothyroidism        ______________________________________________________________________________    History of Present Illness    Oncologist: Dr. Lux    Diagnosis: Lung cancer, adenocarcinoma. Stage IV  with solitary lung nodule and sternal met.   -11/12/24: Done that shows findings suspicious for right upper lobe primary lung neoplasm with isolated sternal mets.  Also was a subtle FDG avid soft tissue nodule, 8 mm in the pancreatic head.  Suggestive of a pancreatic neuroendocrine neoplasm.  - 10/30/2024: Patient had a right lung biopsy that did confirm non-small cell lung cancer consistent with adenocarcinoma, predominantly acinar growth pattern.  Less than 1%.  - 11/21/2024: Patient had biopsy of sternal lesion that did confirm metastatic moderately to poorly differentiated pulmonary adenocarcinoma.  PD-L1 showed a CPS score of 5.  NGS: No actionable mutations, MET [id number]>T alteration of unknown significance noted, pathogenic p53 mutation noted  TMB: 11.62 mutations per megabase.  (On the sternal mass biopsy specimen)    Treatment:   -12/6/24-12/16/24: Initially had radiation to right lung lesions and sternum  -1/3/25-3/7/25: Carboplatin, Alimta, Keytruda. Had 4 cycles.   -started maintenance Keytruda in [place] on 4/7/2025. .     Interim History:   Currently on Keytruda maintenance.    He reported ongoing generalized muscle and joint soreness, described as making it hard to get up and move, though symptoms improve somewhat with activity. He noted that these symptoms were worse when on a six-week dosing schedule of his medication, but have lessened since reducing to a three-week schedule, though they persist at a lower intensity. He described the pain as not severe enough to stop activities, but noticeable.    He also reported persistent neuropathy in the fingertips of both hands, with symptoms ranging from numbness to pain, sometimes varying in intensity. He stated that the neuropathy began during his time in [place], dating back to the period when he was receiving chemotherapy. He clarified that the neuropathy is limited to the fingertips and does not affect his feet. He denied symptoms of carpal tunnel  "syndrome, though he experiences some discomfort in his hands when using his phone for extended periods. He mentioned that a physician in [place] recommended [place] fish oil, B complex, and another supplement for the neuropathy.    He discussed a history of thyroid nodules, for which he underwent an ultrasound.     Walt recalled that his initial PET scan revealed calcification of the arteries around the heart, which was not addressed at the time due to ongoing chemotherapy and radiation. He denied having a cardiologist or any prior cardiac follow-up. He expressed concern about the implications of arterial calcification, asking about potential interventions such as bypass or stenting, and inquired about the significance of calcification and its relationship to arterial stiffness and plaque formation.    Walt described ongoing shortness of breath, particularly with exertion such as walking or climbing stairs. He stated that he does not experience acute episodes of struggling to breathe, but rather needs to stop and catch his breath before continuing. He questioned whether this symptom is related to cardiac issues or residual effects from lung scarring, and noted that he has a primary care physician in [place].    He reported efforts to lose weight, including consuming protein shakes and bars for breakfast and lunch, but stated that he has not experienced any weight loss despite these changes. He expressed concern that his lack of weight loss might be related to thyroid function.        Review of Systems    Pertinent items are noted in HPI.    Past History    Past Medical History:   Diagnosis Date    BCC (basal cell carcinoma), face     Dyslipidemia     Essential hypertension     GERD (gastroesophageal reflux disease)        PHYSICAL EXAM  BP (!) 142/81   Pulse 74   Temp 98.7  F (37.1  C)   Resp 18   Ht 1.803 m (5' 11\")   Wt 105 kg (231 lb 8 oz)   SpO2 95%   BMI 32.29 kg/m      GENERAL: no acute distress. " Cooperative in conversation. Here with wife and son  RESP: Regular respiratory rate   NEURO: non focal. Alert and oriented x3.   PSYCH: within normal limits. No depression or anxiety.  SKIN: exposed skin is dry intact.     Lab Results    Recent Results (from the past week)   Comprehensive metabolic panel   Result Value Ref Range    Sodium 140 135 - 145 mmol/L    Potassium 4.0 3.4 - 5.3 mmol/L    Carbon Dioxide (CO2) 24 22 - 29 mmol/L    Anion Gap 10 7 - 15 mmol/L    Urea Nitrogen 15.6 8.0 - 23.0 mg/dL    Creatinine 0.77 0.67 - 1.17 mg/dL    GFR Estimate >90 >60 mL/min/1.73m2    Calcium 9.4 8.8 - 10.4 mg/dL    Chloride 106 98 - 107 mmol/L    Glucose 119 (H) 70 - 99 mg/dL    Alkaline Phosphatase 76 40 - 150 U/L    AST 27 0 - 45 U/L    ALT 23 0 - 70 U/L    Protein Total 6.6 6.4 - 8.3 g/dL    Albumin 4.1 3.5 - 5.2 g/dL    Bilirubin Total 0.3 <=1.2 mg/dL   TSH with free T4 reflex   Result Value Ref Range    TSH 1.53 0.30 - 4.20 uIU/mL   CRP, inflammation   Result Value Ref Range    CRP Inflammation 3.80 <5.00 mg/L   ESR: Erythrocyte sedimentation rate   Result Value Ref Range    Erythrocyte Sedimentation Rate 13 0 - 20 mm/hr   Hemoglobin A1c   Result Value Ref Range    Estimated Average Glucose 114 <117 mg/dL    Hemoglobin A1C 5.6 <5.7 %         Imaging    US Thyroid  Result Date: 7/18/2025  EXAM: US THYROID LOCATION: [facility] DATE: 7/18/2025 INDICATION:  Thyroid mass COMPARISON: PET scan from 7/10/2025 TECHNIQUE: Thyroid ultrasound. FINDINGS: RIGHT lobe: 4.1 x 2.2 x 2.1 cm. Homogeneous echotexture. Isthmus: 4 mm. LEFT lobe: 3.7 x 1.5 x 1.4 cm. Homogeneous echotexture. NECK: No cervical lymphadenopathy. NODULES: Nodule 1: Right mid thyroid nodule measures 2.5 x 1.9 x 1.6 cm. Composition: Cystic or almost completely cystic, 0 points Echogenicity: Anechoic, 0 points Shape: Wider-than-tall, 0 points Margin: Smooth, 0 points Echogenic Foci: None, or large comet-tail artifacts, 0 points Point Total:  0 points. TI-RADS 1. No FNA.   Nodule 2: Left lower pole nodule measures 0.5 x 0.5 x 0.5 cm. Composition: Cystic or almost completely cystic, 0 points Echogenicity: Anechoic, 0 points Shape: Wider-than-tall, 0 points Margin: Smooth, 0 points Echogenic Foci: None, or large comet-tail artifacts, 0 points Point Total: 0 points. TI-RADS 1. No FNA.     IMPRESSION: 1.  Bilateral TI RADS 1 nodules do not meet criteria for FNA or follow-up. Nodules are characterized per ACR Thyroid Imaging, Reporting and Data System (TI-RADS): White Paper of the ACR TI-RADS Committee Christian Trujillo et al. Journal of the American College of Radiology 2017. Volume 14 (2017), Issue 5, 257-341.     PET Dotatate Eyes to Thighs  Result Date: 7/10/2025  EXAM: PET DOTATATE EYES TO THIGHS, CT CHEST/ABDOMEN/PELVIS W CONTRAST LOCATION: [facility] DATE: 7/10/2025 INDICATION: Abnormal findings on diagnostic imaging of other abdominal regions, including retroperitoneum. COMPARISON: 7/7/2025, 2/12/2025, 11/12/2024 CONTRAST: 114 mL Isovue-370 IV TECHNIQUE: 4.23mCi Cu64 Detectnet mCi Cu 64 dotatate was administered intravenously to the patient. One hour post intravenous administration, PET imaging was performed from the skull vertex to mid thigh, utilizing attenuation correction with concurrent axial CT and PET/CT image fusion. Separate diagnostic CT of the chest, abdomen, and pelvis was performed. Dose reduction techniques were used. PET/CT FINDINGS: Intensely enhancing mass within the pancreatic head measuring 1.1 x 1.1 x 1.0 cm with associated radiotracer uptake, consistent with primary pancreatic neuroendocrine tumor. Minimally avid posttreatment irregular consolidative opacity within the suprahilar right upper lobe. Physiologic radiotracer uptake associated with some presumed BPH nodules in the prostate transition zone. CT FINDINGS: Mild intracranial senescent changes. Moderate mucosal thickening in the paranasal sinuses.  Mild carotid calcifications. Multinodular thyroid, including a 1.9 cm nodule in the right. Severe coronary artery calcium. Right chest port catheter tip in the right atrium. Photopenic right hepatic lobe lesion measuring 1.7 cm with discontinuous peripheral nodular enhancement, consistent with a benign cavernous hemangioma (4/245). Additional 1.2 cm and 1.5 cm probable hemangiomas posterior to the aforementioned hemangioma (4/252) and in the more inferior right hepatic lobe (4/288), respectively. Calcified granulomas of the spleen. Simple renal cysts, which are benign and do not require follow-up. A few colonic diverticula. Infrarenal abdominal aortic aneurysm measuring up to 3 cm AP diameter. Circumferential urinary bladder wall thickening, possibly related to underdistention. Multilevel degenerative disease in the spine.     IMPRESSION: 1.  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 2.  Hepatic hemangiomas. 3.  Multinodular thyroid, including a 1.9 cm nodule in the right. Recommend follow-up thyroid ultrasound. 4.  Circumferential urinary bladder wall thickening, possibly due to underdistention. Consider correlation with urinalysis to exclude cystitis.     CT Chest/Abdomen/Pelvis w Contrast  Result Date: 7/10/2025  EXAM: PET DOTATATE EYES TO THIGHS, CT CHEST/ABDOMEN/PELVIS W CONTRAST LOCATION: [facility] DATE: 7/10/2025 INDICATION: Abnormal findings on diagnostic imaging of other abdominal regions, including retroperitoneum. COMPARISON: 7/7/2025, 2/12/2025, 11/12/2024 CONTRAST: 114 mL Isovue-370 IV TECHNIQUE: 4.23mCi Cu64 Detectnet mCi Cu 64 dotatate was administered intravenously to the patient. One hour post intravenous administration, PET imaging was performed from the skull vertex to mid thigh, utilizing attenuation correction with concurrent axial CT and PET/CT image  fusion. Separate diagnostic CT of the chest, abdomen, and pelvis was performed. Dose reduction techniques were used. PET/CT FINDINGS: Intensely enhancing mass within the pancreatic head measuring 1.1 x 1.1 x 1.0 cm with associated radiotracer uptake, consistent with primary pancreatic neuroendocrine tumor. Minimally avid posttreatment irregular consolidative opacity within the suprahilar right upper lobe. Physiologic radiotracer uptake associated with some presumed BPH nodules in the prostate transition zone. CT FINDINGS: Mild intracranial senescent changes. Moderate mucosal thickening in the paranasal sinuses. Mild carotid calcifications. Multinodular thyroid, including a 1.9 cm nodule in the right. Severe coronary artery calcium. Right chest port catheter tip in the right atrium. Photopenic right hepatic lobe lesion measuring 1.7 cm with discontinuous peripheral nodular enhancement, consistent with a benign cavernous hemangioma (4/245). Additional 1.2 cm and 1.5 cm probable hemangiomas posterior to the aforementioned hemangioma (4/252) and in the more inferior right hepatic lobe (4/288), respectively. Calcified granulomas of the spleen. Simple renal cysts, which are benign and do not require follow-up. A few colonic diverticula. Infrarenal abdominal aortic aneurysm measuring up to 3 cm AP diameter. Circumferential urinary bladder wall thickening, possibly related to underdistention. Multilevel degenerative disease in the spine.     IMPRESSION: 1.  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 2.  Hepatic hemangiomas. 3.  Multinodular thyroid, including a 1.9 cm nodule in the right. Recommend follow-up thyroid ultrasound. 4.  Circumferential urinary bladder wall thickening, possibly due to underdistention. Consider correlation with urinalysis to exclude cystitis.     CT Chest w Contrast  Result Date:  7/8/2025  EXAM: CT CHEST W CONTRAST LOCATION: [facility] DATE: 7/7/2025 INDICATION: Malignant neoplasm metastatic to bone (H). Upper respiratory tract infection, unspecified type, malignant neoplasm of upper lobe of right lung (H). SOB (shortness of breath). COMPARISON: CT 6/5/2025, MRI 10/22/2024. TECHNIQUE: CT chest with IV contrast. Multiplanar reformats were obtained. Dose reduction techniques were used. CONTRAST: 81 mL Isovue-370 IV. FINDINGS: LUNGS AND PLEURA: No effusion. Right upper lobe triangular irregular consolidation is slightly smaller measuring 6.1 x 3.5 cm, previously 6.4 x 3.5 cm, series 4 image 107. This leads towards the right hilum. No new focal airspace disease identified. MEDIASTINUM/AXILLAE: No acute abnormality. No adenopathy. Stable right thyroid nodule that may be further evaluated with ultrasound as needed. Right chest Port-A-Cath. CORONARY ARTERY CALCIFICATION: Moderate. UPPER ABDOMEN: Small enhancing observation at the right hepatic dome is stable suggestive of a hemangioma series 3 image 118. There are other enhancing observations within the liver that may be additional hemangiomas. Correlate with MRI from 10/22/2024 for additional details. Enhancing nodule at the pancreas head is stable and indeterminate measuring 0.8 cm series 3 image 171. No acute upper abdominal abnormality. A few renal cysts noted without specific imaging follow-up recommended. MUSCULOSKELETAL: Degenerative changes of the spine. Expansile bone lesion at the lower sternum with sclerosis appears stable. In transverse plane this is 2.7 x 2.3 cm series 3 image 93. Stable lucency at T12 that is 0.9 cm image 150. Stable sclerosis at T7 is 0.6 cm series 8 image 79. No new bone lesion identified.     IMPRESSION: 1.  Stable versus slightly smaller irregular consolidation at the right upper lobe. No new airspace disease identified. 2.  Stable bone lesions identified at the sternum and lower  thoracic spine. No new bone lesion identified. 3.  Stable enhancing nodule at the pancreas head. This may represent a neuroendocrine tumor. Stable enhancing observations within the liver that may be hemangiomas. Correlate with MRI from 10/22/2024 for additional details.    The longitudinal plan of care for the diagnosis(es)/condition(s) as documented were addressed during this visit. Due to the added complexity in care, I will continue to support Walt in the subsequent management and with ongoing continuity of care.        Signed by: Elizabeth Lux MD

## 2025-07-24 LAB
CCP AB SER IA-ACNC: 0.8 U/ML
PARANEOPLASTIC AB SER QL IF: NORMAL

## 2025-08-06 ENCOUNTER — PATIENT OUTREACH (OUTPATIENT)
Dept: ONCOLOGY | Facility: HOSPITAL | Age: 65
End: 2025-08-06
Payer: MEDICARE

## 2025-08-06 DIAGNOSIS — C34.11 MALIGNANT NEOPLASM OF UPPER LOBE OF RIGHT LUNG (H): ICD-10-CM

## 2025-08-06 DIAGNOSIS — C79.51 MALIGNANT NEOPLASM METASTATIC TO BONE (H): Primary | ICD-10-CM

## 2025-08-07 ENCOUNTER — PATIENT OUTREACH (OUTPATIENT)
Dept: CARE COORDINATION | Facility: CLINIC | Age: 65
End: 2025-08-07
Payer: MEDICARE

## 2025-08-13 ENCOUNTER — INFUSION THERAPY VISIT (OUTPATIENT)
Dept: INFUSION THERAPY | Facility: CLINIC | Age: 65
End: 2025-08-13
Attending: NURSE PRACTITIONER
Payer: MEDICARE

## 2025-08-13 VITALS
RESPIRATION RATE: 16 BRPM | DIASTOLIC BLOOD PRESSURE: 89 MMHG | BODY MASS INDEX: 32.59 KG/M2 | TEMPERATURE: 98 F | OXYGEN SATURATION: 95 % | SYSTOLIC BLOOD PRESSURE: 154 MMHG | HEART RATE: 78 BPM | WEIGHT: 233.7 LBS

## 2025-08-13 DIAGNOSIS — C34.11 MALIGNANT NEOPLASM OF UPPER LOBE OF RIGHT LUNG (H): ICD-10-CM

## 2025-08-13 DIAGNOSIS — C79.51 MALIGNANT NEOPLASM METASTATIC TO BONE (H): Primary | ICD-10-CM

## 2025-08-13 LAB
ALBUMIN SERPL BCG-MCNC: 4.3 G/DL (ref 3.5–5.2)
ALP SERPL-CCNC: 86 U/L (ref 40–150)
ALT SERPL W P-5'-P-CCNC: 27 U/L (ref 0–70)
ANION GAP SERPL CALCULATED.3IONS-SCNC: 11 MMOL/L (ref 7–15)
AST SERPL W P-5'-P-CCNC: 26 U/L (ref 0–45)
BILIRUB SERPL-MCNC: 0.4 MG/DL
BUN SERPL-MCNC: 14.3 MG/DL (ref 8–23)
CALCIUM SERPL-MCNC: 9.2 MG/DL (ref 8.8–10.4)
CHLORIDE SERPL-SCNC: 105 MMOL/L (ref 98–107)
CREAT SERPL-MCNC: 0.78 MG/DL (ref 0.67–1.17)
EGFRCR SERPLBLD CKD-EPI 2021: >90 ML/MIN/1.73M2
GLUCOSE SERPL-MCNC: 87 MG/DL (ref 70–99)
HCO3 SERPL-SCNC: 25 MMOL/L (ref 22–29)
POTASSIUM SERPL-SCNC: 4.2 MMOL/L (ref 3.4–5.3)
PROT SERPL-MCNC: 7 G/DL (ref 6.4–8.3)
SODIUM SERPL-SCNC: 141 MMOL/L (ref 135–145)
TSH SERPL DL<=0.005 MIU/L-ACNC: 1.78 UIU/ML (ref 0.3–4.2)

## 2025-08-13 PROCEDURE — 250N000011 HC RX IP 250 OP 636: Mod: JZ

## 2025-08-13 PROCEDURE — 250N000011 HC RX IP 250 OP 636: Performed by: INTERNAL MEDICINE

## 2025-08-13 PROCEDURE — 36591 DRAW BLOOD OFF VENOUS DEVICE: CPT | Performed by: NURSE PRACTITIONER

## 2025-08-13 PROCEDURE — 84443 ASSAY THYROID STIM HORMONE: CPT | Performed by: NURSE PRACTITIONER

## 2025-08-13 PROCEDURE — 96413 CHEMO IV INFUSION 1 HR: CPT

## 2025-08-13 PROCEDURE — 82310 ASSAY OF CALCIUM: CPT | Performed by: NURSE PRACTITIONER

## 2025-08-13 PROCEDURE — 258N000003 HC RX IP 258 OP 636

## 2025-08-13 RX ORDER — DIPHENHYDRAMINE HYDROCHLORIDE 50 MG/ML
50 INJECTION, SOLUTION INTRAMUSCULAR; INTRAVENOUS
Status: CANCELLED
Start: 2025-08-13

## 2025-08-13 RX ORDER — HEPARIN SODIUM (PORCINE) LOCK FLUSH IV SOLN 100 UNIT/ML 100 UNIT/ML
5 SOLUTION INTRAVENOUS
OUTPATIENT
Start: 2025-08-13

## 2025-08-13 RX ORDER — MEPERIDINE HYDROCHLORIDE 25 MG/ML
25 INJECTION INTRAMUSCULAR; INTRAVENOUS; SUBCUTANEOUS
Status: CANCELLED | OUTPATIENT
Start: 2025-08-13

## 2025-08-13 RX ORDER — EPINEPHRINE 1 MG/ML
0.3 INJECTION, SOLUTION, CONCENTRATE INTRAVENOUS EVERY 5 MIN PRN
Status: CANCELLED | OUTPATIENT
Start: 2025-08-13

## 2025-08-13 RX ORDER — HEPARIN SODIUM (PORCINE) LOCK FLUSH IV SOLN 100 UNIT/ML 100 UNIT/ML
5 SOLUTION INTRAVENOUS
Status: CANCELLED | OUTPATIENT
Start: 2025-08-13

## 2025-08-13 RX ORDER — ALBUTEROL SULFATE 0.83 MG/ML
2.5 SOLUTION RESPIRATORY (INHALATION)
Status: CANCELLED | OUTPATIENT
Start: 2025-08-13

## 2025-08-13 RX ORDER — LORAZEPAM 2 MG/ML
0.5 INJECTION INTRAMUSCULAR EVERY 4 HOURS PRN
Status: CANCELLED | OUTPATIENT
Start: 2025-08-13

## 2025-08-13 RX ORDER — DIPHENHYDRAMINE HYDROCHLORIDE 50 MG/ML
25 INJECTION, SOLUTION INTRAMUSCULAR; INTRAVENOUS
Status: CANCELLED
Start: 2025-08-13

## 2025-08-13 RX ORDER — ALBUTEROL SULFATE 90 UG/1
1-2 INHALANT RESPIRATORY (INHALATION)
Status: CANCELLED
Start: 2025-08-13

## 2025-08-13 RX ORDER — METHYLPREDNISOLONE SODIUM SUCCINATE 40 MG/ML
40 INJECTION INTRAMUSCULAR; INTRAVENOUS
Status: CANCELLED
Start: 2025-08-13

## 2025-08-13 RX ORDER — HEPARIN SODIUM (PORCINE) LOCK FLUSH IV SOLN 100 UNIT/ML 100 UNIT/ML
5 SOLUTION INTRAVENOUS
Status: DISCONTINUED | OUTPATIENT
Start: 2025-08-13 | End: 2025-08-13 | Stop reason: HOSPADM

## 2025-08-13 RX ADMIN — SODIUM CHLORIDE 250 ML: 0.9 INJECTION, SOLUTION INTRAVENOUS at 14:07

## 2025-08-13 RX ADMIN — HEPARIN 5 ML: 100 SYRINGE at 15:12

## 2025-08-13 RX ADMIN — SODIUM CHLORIDE 200 MG: 9 INJECTION, SOLUTION INTRAVENOUS at 14:28

## 2025-08-13 ASSESSMENT — PAIN SCALES - GENERAL: PAINLEVEL_OUTOF10: NO PAIN (0)

## 2025-08-19 DIAGNOSIS — C79.51 MALIGNANT NEOPLASM METASTATIC TO BONE (H): Primary | ICD-10-CM

## 2025-08-19 DIAGNOSIS — C34.11 MALIGNANT NEOPLASM OF UPPER LOBE OF RIGHT LUNG (H): ICD-10-CM

## 2025-09-03 ENCOUNTER — INFUSION THERAPY VISIT (OUTPATIENT)
Dept: INFUSION THERAPY | Facility: CLINIC | Age: 65
End: 2025-09-03
Attending: NURSE PRACTITIONER
Payer: MEDICARE

## 2025-09-03 ENCOUNTER — VIRTUAL VISIT (OUTPATIENT)
Dept: ONCOLOGY | Facility: HOSPITAL | Age: 65
End: 2025-09-03
Attending: INTERNAL MEDICINE
Payer: MEDICARE

## 2025-09-03 VITALS
BODY MASS INDEX: 32.59 KG/M2 | DIASTOLIC BLOOD PRESSURE: 93 MMHG | HEART RATE: 76 BPM | WEIGHT: 233.7 LBS | SYSTOLIC BLOOD PRESSURE: 146 MMHG | TEMPERATURE: 98.2 F

## 2025-09-03 VITALS — WEIGHT: 230 LBS | DIASTOLIC BLOOD PRESSURE: 75 MMHG | SYSTOLIC BLOOD PRESSURE: 160 MMHG | BODY MASS INDEX: 32.08 KG/M2

## 2025-09-03 DIAGNOSIS — C34.11 MALIGNANT NEOPLASM OF UPPER LOBE OF RIGHT LUNG (H): Primary | ICD-10-CM

## 2025-09-03 DIAGNOSIS — C79.51 MALIGNANT NEOPLASM METASTATIC TO BONE (H): Primary | ICD-10-CM

## 2025-09-03 DIAGNOSIS — C34.11 MALIGNANT NEOPLASM OF UPPER LOBE OF RIGHT LUNG (H): ICD-10-CM

## 2025-09-03 DIAGNOSIS — C79.51 MALIGNANT NEOPLASM METASTATIC TO BONE (H): ICD-10-CM

## 2025-09-03 LAB
ALBUMIN SERPL BCG-MCNC: 4.4 G/DL (ref 3.5–5.2)
ALP SERPL-CCNC: 86 U/L (ref 40–150)
ALT SERPL W P-5'-P-CCNC: 23 U/L (ref 0–70)
ANION GAP SERPL CALCULATED.3IONS-SCNC: 12 MMOL/L (ref 7–15)
AST SERPL W P-5'-P-CCNC: 22 U/L (ref 0–45)
BILIRUB SERPL-MCNC: 0.4 MG/DL
BUN SERPL-MCNC: 21.1 MG/DL (ref 8–23)
CALCIUM SERPL-MCNC: 9.3 MG/DL (ref 8.8–10.4)
CHLORIDE SERPL-SCNC: 105 MMOL/L (ref 98–107)
CREAT SERPL-MCNC: 0.8 MG/DL (ref 0.67–1.17)
EGFRCR SERPLBLD CKD-EPI 2021: >90 ML/MIN/1.73M2
GLUCOSE SERPL-MCNC: 92 MG/DL (ref 70–99)
HCO3 SERPL-SCNC: 24 MMOL/L (ref 22–29)
POTASSIUM SERPL-SCNC: 4 MMOL/L (ref 3.4–5.3)
PROT SERPL-MCNC: 7 G/DL (ref 6.4–8.3)
SODIUM SERPL-SCNC: 141 MMOL/L (ref 135–145)
TSH SERPL DL<=0.005 MIU/L-ACNC: 1.52 UIU/ML (ref 0.3–4.2)

## 2025-09-03 PROCEDURE — 258N000003 HC RX IP 258 OP 636: Performed by: NURSE PRACTITIONER

## 2025-09-03 PROCEDURE — 98006 SYNCH AUDIO-VIDEO EST MOD 30: CPT | Performed by: NURSE PRACTITIONER

## 2025-09-03 PROCEDURE — 3077F SYST BP >= 140 MM HG: CPT | Performed by: NURSE PRACTITIONER

## 2025-09-03 PROCEDURE — 84443 ASSAY THYROID STIM HORMONE: CPT | Performed by: INTERNAL MEDICINE

## 2025-09-03 PROCEDURE — 250N000011 HC RX IP 250 OP 636: Performed by: INTERNAL MEDICINE

## 2025-09-03 PROCEDURE — 96413 CHEMO IV INFUSION 1 HR: CPT

## 2025-09-03 PROCEDURE — 3078F DIAST BP <80 MM HG: CPT | Performed by: NURSE PRACTITIONER

## 2025-09-03 PROCEDURE — 84460 ALANINE AMINO (ALT) (SGPT): CPT | Performed by: INTERNAL MEDICINE

## 2025-09-03 PROCEDURE — 36591 DRAW BLOOD OFF VENOUS DEVICE: CPT | Performed by: INTERNAL MEDICINE

## 2025-09-03 PROCEDURE — 250N000011 HC RX IP 250 OP 636: Mod: JZ | Performed by: NURSE PRACTITIONER

## 2025-09-03 PROCEDURE — 1126F AMNT PAIN NOTED NONE PRSNT: CPT | Performed by: NURSE PRACTITIONER

## 2025-09-03 RX ORDER — METHYLPREDNISOLONE SODIUM SUCCINATE 40 MG/ML
40 INJECTION INTRAMUSCULAR; INTRAVENOUS
Status: CANCELLED
Start: 2025-09-03

## 2025-09-03 RX ORDER — DIPHENHYDRAMINE HYDROCHLORIDE 50 MG/ML
50 INJECTION INTRAMUSCULAR; INTRAVENOUS
Start: 2025-10-15

## 2025-09-03 RX ORDER — EPINEPHRINE 1 MG/ML
0.3 INJECTION, SOLUTION INTRAMUSCULAR; SUBCUTANEOUS EVERY 5 MIN PRN
Status: CANCELLED | OUTPATIENT
Start: 2025-09-03

## 2025-09-03 RX ORDER — ALBUTEROL SULFATE 0.83 MG/ML
2.5 SOLUTION RESPIRATORY (INHALATION)
OUTPATIENT
Start: 2025-10-15

## 2025-09-03 RX ORDER — LORAZEPAM 2 MG/ML
0.5 INJECTION INTRAMUSCULAR EVERY 4 HOURS PRN
OUTPATIENT
Start: 2025-10-15

## 2025-09-03 RX ORDER — MEPERIDINE HYDROCHLORIDE 25 MG/ML
25 INJECTION INTRAMUSCULAR; INTRAVENOUS; SUBCUTANEOUS
Status: CANCELLED | OUTPATIENT
Start: 2025-09-03

## 2025-09-03 RX ORDER — DIPHENHYDRAMINE HYDROCHLORIDE 50 MG/ML
25 INJECTION INTRAMUSCULAR; INTRAVENOUS
Start: 2025-10-15

## 2025-09-03 RX ORDER — LORAZEPAM 2 MG/ML
0.5 INJECTION INTRAMUSCULAR EVERY 4 HOURS PRN
Status: CANCELLED | OUTPATIENT
Start: 2025-09-03

## 2025-09-03 RX ORDER — ALBUTEROL SULFATE 0.83 MG/ML
2.5 SOLUTION RESPIRATORY (INHALATION)
OUTPATIENT
Start: 2025-09-24

## 2025-09-03 RX ORDER — METHYLPREDNISOLONE SODIUM SUCCINATE 40 MG/ML
40 INJECTION INTRAMUSCULAR; INTRAVENOUS
Start: 2025-10-15

## 2025-09-03 RX ORDER — METHYLPREDNISOLONE SODIUM SUCCINATE 40 MG/ML
40 INJECTION INTRAMUSCULAR; INTRAVENOUS
Start: 2025-09-24

## 2025-09-03 RX ORDER — MEPERIDINE HYDROCHLORIDE 25 MG/ML
25 INJECTION INTRAMUSCULAR; INTRAVENOUS; SUBCUTANEOUS
OUTPATIENT
Start: 2025-09-24

## 2025-09-03 RX ORDER — ALBUTEROL SULFATE 90 UG/1
1-2 INHALANT RESPIRATORY (INHALATION)
Start: 2025-09-24

## 2025-09-03 RX ORDER — MEPERIDINE HYDROCHLORIDE 25 MG/ML
25 INJECTION INTRAMUSCULAR; INTRAVENOUS; SUBCUTANEOUS
OUTPATIENT
Start: 2025-10-15

## 2025-09-03 RX ORDER — HEPARIN SODIUM (PORCINE) LOCK FLUSH IV SOLN 100 UNIT/ML 100 UNIT/ML
5 SOLUTION INTRAVENOUS
OUTPATIENT
Start: 2025-09-03

## 2025-09-03 RX ORDER — DIPHENHYDRAMINE HYDROCHLORIDE 50 MG/ML
25 INJECTION INTRAMUSCULAR; INTRAVENOUS
Status: CANCELLED
Start: 2025-09-03

## 2025-09-03 RX ORDER — ALBUTEROL SULFATE 0.83 MG/ML
2.5 SOLUTION RESPIRATORY (INHALATION)
Status: CANCELLED | OUTPATIENT
Start: 2025-09-03

## 2025-09-03 RX ORDER — HEPARIN SODIUM (PORCINE) LOCK FLUSH IV SOLN 100 UNIT/ML 100 UNIT/ML
5 SOLUTION INTRAVENOUS
Status: DISCONTINUED | OUTPATIENT
Start: 2025-09-03 | End: 2025-09-03 | Stop reason: HOSPADM

## 2025-09-03 RX ORDER — EPINEPHRINE 1 MG/ML
0.3 INJECTION, SOLUTION INTRAMUSCULAR; SUBCUTANEOUS EVERY 5 MIN PRN
OUTPATIENT
Start: 2025-10-15

## 2025-09-03 RX ORDER — LORAZEPAM 2 MG/ML
0.5 INJECTION INTRAMUSCULAR EVERY 4 HOURS PRN
OUTPATIENT
Start: 2025-09-24

## 2025-09-03 RX ORDER — ALBUTEROL SULFATE 90 UG/1
1-2 INHALANT RESPIRATORY (INHALATION)
Status: CANCELLED
Start: 2025-09-03

## 2025-09-03 RX ORDER — DIPHENHYDRAMINE HYDROCHLORIDE 50 MG/ML
50 INJECTION INTRAMUSCULAR; INTRAVENOUS
Status: CANCELLED
Start: 2025-09-03

## 2025-09-03 RX ORDER — DIPHENHYDRAMINE HYDROCHLORIDE 50 MG/ML
50 INJECTION INTRAMUSCULAR; INTRAVENOUS
Start: 2025-09-24

## 2025-09-03 RX ORDER — LISINOPRIL 10 MG/1
10 TABLET ORAL 2 TIMES DAILY
COMMUNITY
Start: 2025-08-08

## 2025-09-03 RX ORDER — ALBUTEROL SULFATE 90 UG/1
1-2 INHALANT RESPIRATORY (INHALATION)
Start: 2025-10-15

## 2025-09-03 RX ORDER — EPINEPHRINE 1 MG/ML
0.3 INJECTION, SOLUTION INTRAMUSCULAR; SUBCUTANEOUS EVERY 5 MIN PRN
OUTPATIENT
Start: 2025-09-24

## 2025-09-03 RX ORDER — DIPHENHYDRAMINE HYDROCHLORIDE 50 MG/ML
25 INJECTION INTRAMUSCULAR; INTRAVENOUS
Start: 2025-09-24

## 2025-09-03 RX ADMIN — HEPARIN 5 ML: 100 SYRINGE at 15:18

## 2025-09-03 RX ADMIN — SODIUM CHLORIDE 200 MG: 9 INJECTION, SOLUTION INTRAVENOUS at 14:30

## 2025-09-03 RX ADMIN — SODIUM CHLORIDE 100 ML: 9 INJECTION, SOLUTION INTRAVENOUS at 14:26

## 2025-09-03 ASSESSMENT — PAIN SCALES - GENERAL: PAINLEVEL_OUTOF10: NO PAIN (0)

## 2025-09-04 ENCOUNTER — PATIENT OUTREACH (OUTPATIENT)
Dept: ONCOLOGY | Facility: HOSPITAL | Age: 65
End: 2025-09-04
Payer: MEDICARE

## (undated) DEVICE — PACK ENDOSCOPY GI CUSTOM UMMC

## (undated) DEVICE — SUCTION MANIFOLD NEPTUNE 2 SYS 4 PORT 0702-020-000

## (undated) DEVICE — ENDO TUBING CO2 SMARTCAP STERILE DISP 100145CO2EXT

## (undated) DEVICE — KIT ENDO FIRST STEP DISINFECTANT 200ML W/POUCH EP-4

## (undated) DEVICE — BITE BLOCK ENDO W/DENTAL RIM 54FR SBT-114-100

## (undated) DEVICE — ENDO PROBE COVER ULTRASOUND BALLOON LATEX  MAJ-249

## (undated) DEVICE — SOL WATER IRRIG 1000ML BOTTLE 2F7114

## (undated) DEVICE — LABEL MEDICATION SYSTEM 3303-P

## (undated) DEVICE — KIT CONNECTOR FOR OLYMPUS ENDOSCOPES DEFENDO 100310

## (undated) DEVICE — ENDO NDL ASPIRATION ULTRASOUND 22GA ACQUIRE M00555640

## (undated) DEVICE — ENDO BITE BLOCK ADULT OMNI-BLOC

## (undated) DEVICE — ENDO FORCEP BX CAPTURA PRO SPIKE G50696

## (undated) DEVICE — ENDO NDL BALL TIP ULTRASOUND 22GA 5.2FR ECHO-3-22

## (undated) DEVICE — SPECIMEN CONTAINER 90ML W/10% FORMALIN C4320-45H

## (undated) DEVICE — ENDO CAP AND TUBING STERILE FOR ENDOGATOR  100130

## (undated) RX ORDER — ONDANSETRON 2 MG/ML
INJECTION INTRAMUSCULAR; INTRAVENOUS
Status: DISPENSED
Start: 2024-10-03

## (undated) RX ORDER — PROPOFOL 10 MG/ML
INJECTION, EMULSION INTRAVENOUS
Status: DISPENSED
Start: 2024-10-03

## (undated) RX ORDER — DEXAMETHASONE SODIUM PHOSPHATE 4 MG/ML
INJECTION, SOLUTION INTRA-ARTICULAR; INTRALESIONAL; INTRAMUSCULAR; INTRAVENOUS; SOFT TISSUE
Status: DISPENSED
Start: 2024-10-03

## (undated) RX ORDER — FENTANYL CITRATE 50 UG/ML
INJECTION, SOLUTION INTRAMUSCULAR; INTRAVENOUS
Status: DISPENSED
Start: 2024-12-30

## (undated) RX ORDER — LIDOCAINE HYDROCHLORIDE 10 MG/ML
INJECTION, SOLUTION EPIDURAL; INFILTRATION; INTRACAUDAL; PERINEURAL
Status: DISPENSED
Start: 2024-10-30

## (undated) RX ORDER — CEFAZOLIN SODIUM/WATER 2 G/20 ML
SYRINGE (ML) INTRAVENOUS
Status: DISPENSED
Start: 2024-12-30

## (undated) RX ORDER — FENTANYL CITRATE-0.9 % NACL/PF 10 MCG/ML
PLASTIC BAG, INJECTION (ML) INTRAVENOUS
Status: DISPENSED
Start: 2024-10-03

## (undated) RX ORDER — FENTANYL CITRATE 50 UG/ML
INJECTION, SOLUTION INTRAMUSCULAR; INTRAVENOUS
Status: DISPENSED
Start: 2024-10-30

## (undated) RX ORDER — FENTANYL CITRATE 50 UG/ML
INJECTION, SOLUTION INTRAMUSCULAR; INTRAVENOUS
Status: DISPENSED
Start: 2024-10-03

## (undated) RX ORDER — FENTANYL CITRATE 50 UG/ML
INJECTION, SOLUTION INTRAMUSCULAR; INTRAVENOUS
Status: DISPENSED
Start: 2024-09-12

## (undated) RX ORDER — ACETAMINOPHEN 325 MG/1
TABLET ORAL
Status: DISPENSED
Start: 2024-10-03

## (undated) RX ORDER — ACETAMINOPHEN 325 MG/1
TABLET ORAL
Status: DISPENSED
Start: 2024-09-12